# Patient Record
Sex: FEMALE | Race: BLACK OR AFRICAN AMERICAN | NOT HISPANIC OR LATINO | URBAN - METROPOLITAN AREA
[De-identification: names, ages, dates, MRNs, and addresses within clinical notes are randomized per-mention and may not be internally consistent; named-entity substitution may affect disease eponyms.]

---

## 2017-10-13 ENCOUNTER — EMERGENCY (EMERGENCY)
Facility: HOSPITAL | Age: 52
LOS: 0 days | Discharge: HOME | End: 2017-10-13

## 2017-10-13 DIAGNOSIS — M54.9 DORSALGIA, UNSPECIFIED: ICD-10-CM

## 2017-10-13 DIAGNOSIS — M54.2 CERVICALGIA: ICD-10-CM
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
## 2017-10-13 DIAGNOSIS — R07.89 OTHER CHEST PAIN: ICD-10-CM
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
## 2021-03-10 ENCOUNTER — INPATIENT (INPATIENT)
Facility: HOSPITAL | Age: 56
LOS: 36 days | Discharge: HOME HEALTH | End: 2021-04-16
Attending: HOSPITALIST | Admitting: HOSPITALIST
Payer: MEDICAID

## 2021-03-10 VITALS
HEART RATE: 116 BPM | DIASTOLIC BLOOD PRESSURE: 63 MMHG | TEMPERATURE: 98 F | OXYGEN SATURATION: 78 % | RESPIRATION RATE: 28 BRPM | SYSTOLIC BLOOD PRESSURE: 140 MMHG

## 2021-03-10 LAB
ALBUMIN SERPL ELPH-MCNC: 3.9 G/DL — SIGNIFICANT CHANGE UP (ref 3.5–5.2)
ALP SERPL-CCNC: 81 U/L — SIGNIFICANT CHANGE UP (ref 30–115)
ALT FLD-CCNC: 31 U/L — SIGNIFICANT CHANGE UP (ref 0–41)
ANION GAP SERPL CALC-SCNC: 17 MMOL/L — HIGH (ref 7–14)
AST SERPL-CCNC: 77 U/L — HIGH (ref 0–41)
BASOPHILS # BLD AUTO: 0.01 K/UL — SIGNIFICANT CHANGE UP (ref 0–0.2)
BASOPHILS NFR BLD AUTO: 0.1 % — SIGNIFICANT CHANGE UP (ref 0–1)
BILIRUB SERPL-MCNC: <0.2 MG/DL — SIGNIFICANT CHANGE UP (ref 0.2–1.2)
BUN SERPL-MCNC: 18 MG/DL — SIGNIFICANT CHANGE UP (ref 10–20)
CALCIUM SERPL-MCNC: 8.7 MG/DL — SIGNIFICANT CHANGE UP (ref 8.5–10.1)
CHLORIDE SERPL-SCNC: 102 MMOL/L — SIGNIFICANT CHANGE UP (ref 98–110)
CO2 SERPL-SCNC: 19 MMOL/L — SIGNIFICANT CHANGE UP (ref 17–32)
CREAT SERPL-MCNC: 1.1 MG/DL — SIGNIFICANT CHANGE UP (ref 0.7–1.5)
D DIMER BLD IA.RAPID-MCNC: 248 NG/ML DDU — HIGH (ref 0–230)
EOSINOPHIL # BLD AUTO: 0 K/UL — SIGNIFICANT CHANGE UP (ref 0–0.7)
EOSINOPHIL NFR BLD AUTO: 0 % — SIGNIFICANT CHANGE UP (ref 0–8)
GAS PNL BLDV: SIGNIFICANT CHANGE UP
GLUCOSE SERPL-MCNC: 276 MG/DL — HIGH (ref 70–99)
HCT VFR BLD CALC: 36.1 % — LOW (ref 37–47)
HGB BLD-MCNC: 11.6 G/DL — LOW (ref 12–16)
IMM GRANULOCYTES NFR BLD AUTO: 0.4 % — HIGH (ref 0.1–0.3)
LACTATE SERPL-SCNC: 2.4 MMOL/L — HIGH (ref 0.7–2)
LYMPHOCYTES # BLD AUTO: 1.06 K/UL — LOW (ref 1.2–3.4)
LYMPHOCYTES # BLD AUTO: 13.9 % — LOW (ref 20.5–51.1)
MCHC RBC-ENTMCNC: 29 PG — SIGNIFICANT CHANGE UP (ref 27–31)
MCHC RBC-ENTMCNC: 32.1 G/DL — SIGNIFICANT CHANGE UP (ref 32–37)
MCV RBC AUTO: 90.3 FL — SIGNIFICANT CHANGE UP (ref 81–99)
MONOCYTES # BLD AUTO: 0.4 K/UL — SIGNIFICANT CHANGE UP (ref 0.1–0.6)
MONOCYTES NFR BLD AUTO: 5.2 % — SIGNIFICANT CHANGE UP (ref 1.7–9.3)
NEUTROPHILS # BLD AUTO: 6.15 K/UL — SIGNIFICANT CHANGE UP (ref 1.4–6.5)
NEUTROPHILS NFR BLD AUTO: 80.4 % — HIGH (ref 42.2–75.2)
NRBC # BLD: 0 /100 WBCS — SIGNIFICANT CHANGE UP (ref 0–0)
NT-PROBNP SERPL-SCNC: 237 PG/ML — SIGNIFICANT CHANGE UP (ref 0–300)
PLATELET # BLD AUTO: 253 K/UL — SIGNIFICANT CHANGE UP (ref 130–400)
POTASSIUM SERPL-MCNC: 4 MMOL/L — SIGNIFICANT CHANGE UP (ref 3.5–5)
POTASSIUM SERPL-SCNC: 4 MMOL/L — SIGNIFICANT CHANGE UP (ref 3.5–5)
PROT SERPL-MCNC: 7.3 G/DL — SIGNIFICANT CHANGE UP (ref 6–8)
RBC # BLD: 4 M/UL — LOW (ref 4.2–5.4)
RBC # FLD: 13.6 % — SIGNIFICANT CHANGE UP (ref 11.5–14.5)
SODIUM SERPL-SCNC: 138 MMOL/L — SIGNIFICANT CHANGE UP (ref 135–146)
TROPONIN T SERPL-MCNC: 0.02 NG/ML — HIGH
WBC # BLD: 7.65 K/UL — SIGNIFICANT CHANGE UP (ref 4.8–10.8)
WBC # FLD AUTO: 7.65 K/UL — SIGNIFICANT CHANGE UP (ref 4.8–10.8)

## 2021-03-10 PROCEDURE — 93010 ELECTROCARDIOGRAM REPORT: CPT

## 2021-03-10 PROCEDURE — 99291 CRITICAL CARE FIRST HOUR: CPT

## 2021-03-10 RX ORDER — DEXAMETHASONE 0.5 MG/5ML
6 ELIXIR ORAL ONCE
Refills: 0 | Status: COMPLETED | OUTPATIENT
Start: 2021-03-10 | End: 2021-03-10

## 2021-03-10 RX ORDER — ACETAMINOPHEN 500 MG
650 TABLET ORAL ONCE
Refills: 0 | Status: COMPLETED | OUTPATIENT
Start: 2021-03-10 | End: 2021-03-10

## 2021-03-10 RX ADMIN — Medication 650 MILLIGRAM(S): at 23:42

## 2021-03-10 RX ADMIN — Medication 6 MILLIGRAM(S): at 23:42

## 2021-03-10 NOTE — ED PROVIDER NOTE - PHYSICAL EXAMINATION
GENERAL: NAD  SKIN: warm, dry  HEAD: Normocephalic; atraumatic.  EYES: PERRLA, EOMI  CARD: S1, S2 normal; no murmurs, gallops, or rubs. Regular rate and rhythm.   RESP: Tachypneic. No wheezes, rales or rhonchi.  ABD: soft, nontender, and nondistended  EXT :No LE TTP or edema bilaterally.  NEURO: Alert, oriented, grossly unremarkable  PSYCH: Cooperative, appropriate.

## 2021-03-10 NOTE — ED PROVIDER NOTE - NS ED ROS FT
Constitutional: Reports chills.   HEENT: No headache, visual changes  Cardiac:  No chest pain, leg edema, or leg pain.  Respiratory:  No cough and SOB  GI:  No nausea, vomiting, diarrhea, or abdominal pain.  :  No dysuria, frequency, or urgency.  MS:  No myalgia, muscle weakness, joint pain or back pain.  Neuro:  No dizziness, LOC, paralysis, or N/T.  Skin:  No skin rash.   Endocrine: No polyuria, polyphagia, or polydipsia.

## 2021-03-10 NOTE — ED PROVIDER NOTE - ATTENDING CONTRIBUTION TO CARE
I personally evaluated the patient. I reviewed the Resident’s or Physician Assistant’s note (as assigned above), and agree with the findings and plan except as documented in my note.    57 yo woman with acute hypoxic respiratory failure and fever consistent with COVID-19 infection.  IV dexamethasone. Failed proning with NRB.  Hi-flow improved O2 sat and will need critical care admission.

## 2021-03-10 NOTE — ED PROVIDER NOTE - OBJECTIVE STATEMENT
56 y.o. female w/ PMH of HTN presents for SOB x 1 day.  States since yesterday has had increasing SOB, has been having cough since Monday.  Associated chills.  Denies sick contacts, chest pain, leg pain, leg swelling, hx of blood clots, hemoptysis, N/V, abdominal pain, diarrhea.

## 2021-03-10 NOTE — ED PROVIDER NOTE - CARE PLAN
Principal Discharge DX:	Shortness of breath  Secondary Diagnosis:	Elevated troponin  Secondary Diagnosis:	Pneumonia   Principal Discharge DX:	Acute respiratory failure with hypoxia  Secondary Diagnosis:	Elevated troponin  Secondary Diagnosis:	Pneumonia  Secondary Diagnosis:	COVID-19

## 2021-03-10 NOTE — ED PROVIDER NOTE - CLINICAL SUMMARY MEDICAL DECISION MAKING FREE TEXT BOX
55 yo woman with acute hypoxic respiratory failure secondary to COVID.  IV dexamethasone and admission to critical care setting.  Requiring hi-flow O2 with good response.

## 2021-03-11 LAB
ALBUMIN SERPL ELPH-MCNC: 3.7 G/DL — SIGNIFICANT CHANGE UP (ref 3.5–5.2)
ALP SERPL-CCNC: 71 U/L — SIGNIFICANT CHANGE UP (ref 30–115)
ALT FLD-CCNC: 28 U/L — SIGNIFICANT CHANGE UP (ref 0–41)
ANION GAP SERPL CALC-SCNC: 15 MMOL/L — HIGH (ref 7–14)
APPEARANCE UR: CLEAR — SIGNIFICANT CHANGE UP
APTT BLD: 42.1 SEC — HIGH (ref 27–39.2)
AST SERPL-CCNC: 68 U/L — HIGH (ref 0–41)
BACTERIA # UR AUTO: NEGATIVE — SIGNIFICANT CHANGE UP
BASOPHILS # BLD AUTO: 0.01 K/UL — SIGNIFICANT CHANGE UP (ref 0–0.2)
BASOPHILS NFR BLD AUTO: 0.2 % — SIGNIFICANT CHANGE UP (ref 0–1)
BILIRUB SERPL-MCNC: <0.2 MG/DL — SIGNIFICANT CHANGE UP (ref 0.2–1.2)
BILIRUB UR-MCNC: NEGATIVE — SIGNIFICANT CHANGE UP
BLD GP AB SCN SERPL QL: SIGNIFICANT CHANGE UP
BUN SERPL-MCNC: 18 MG/DL — SIGNIFICANT CHANGE UP (ref 10–20)
CALCIUM SERPL-MCNC: 8.5 MG/DL — SIGNIFICANT CHANGE UP (ref 8.5–10.1)
CHLORIDE SERPL-SCNC: 104 MMOL/L — SIGNIFICANT CHANGE UP (ref 98–110)
CK MB CFR SERPL CALC: 5.2 NG/ML — SIGNIFICANT CHANGE UP (ref 0.6–6.3)
CK SERPL-CCNC: 245 U/L — HIGH (ref 0–225)
CO2 SERPL-SCNC: 19 MMOL/L — SIGNIFICANT CHANGE UP (ref 17–32)
COLOR SPEC: YELLOW — SIGNIFICANT CHANGE UP
CREAT SERPL-MCNC: 0.8 MG/DL — SIGNIFICANT CHANGE UP (ref 0.7–1.5)
CRP SERPL-MCNC: 214 MG/L — HIGH
DIFF PNL FLD: ABNORMAL
EOSINOPHIL # BLD AUTO: 0 K/UL — SIGNIFICANT CHANGE UP (ref 0–0.7)
EOSINOPHIL NFR BLD AUTO: 0 % — SIGNIFICANT CHANGE UP (ref 0–8)
EPI CELLS # UR: 13 /HPF — HIGH (ref 0–5)
FERRITIN SERPL-MCNC: 1148 NG/ML — HIGH (ref 15–150)
GLUCOSE BLDC GLUCOMTR-MCNC: 284 MG/DL — HIGH (ref 70–99)
GLUCOSE BLDC GLUCOMTR-MCNC: 301 MG/DL — HIGH (ref 70–99)
GLUCOSE BLDC GLUCOMTR-MCNC: 303 MG/DL — HIGH (ref 70–99)
GLUCOSE BLDC GLUCOMTR-MCNC: 341 MG/DL — HIGH (ref 70–99)
GLUCOSE SERPL-MCNC: 305 MG/DL — HIGH (ref 70–99)
GLUCOSE UR QL: NEGATIVE — SIGNIFICANT CHANGE UP
HCT VFR BLD CALC: 32.8 % — LOW (ref 37–47)
HCV AB S/CO SERPL IA: 0.03 COI — SIGNIFICANT CHANGE UP
HCV AB SERPL-IMP: SIGNIFICANT CHANGE UP
HGB BLD-MCNC: 10.6 G/DL — LOW (ref 12–16)
HYALINE CASTS # UR AUTO: 87 /LPF — HIGH (ref 0–7)
IMM GRANULOCYTES NFR BLD AUTO: 0.3 % — SIGNIFICANT CHANGE UP (ref 0.1–0.3)
INR BLD: 1.21 RATIO — SIGNIFICANT CHANGE UP (ref 0.65–1.3)
KETONES UR-MCNC: SIGNIFICANT CHANGE UP
LACTATE SERPL-SCNC: 1.4 MMOL/L — SIGNIFICANT CHANGE UP (ref 0.7–2)
LEUKOCYTE ESTERASE UR-ACNC: NEGATIVE — SIGNIFICANT CHANGE UP
LYMPHOCYTES # BLD AUTO: 0.71 K/UL — LOW (ref 1.2–3.4)
LYMPHOCYTES # BLD AUTO: 12.2 % — LOW (ref 20.5–51.1)
MAGNESIUM SERPL-MCNC: 2.4 MG/DL — SIGNIFICANT CHANGE UP (ref 1.8–2.4)
MCHC RBC-ENTMCNC: 29 PG — SIGNIFICANT CHANGE UP (ref 27–31)
MCHC RBC-ENTMCNC: 32.3 G/DL — SIGNIFICANT CHANGE UP (ref 32–37)
MCV RBC AUTO: 89.6 FL — SIGNIFICANT CHANGE UP (ref 81–99)
MONOCYTES # BLD AUTO: 0.22 K/UL — SIGNIFICANT CHANGE UP (ref 0.1–0.6)
MONOCYTES NFR BLD AUTO: 3.8 % — SIGNIFICANT CHANGE UP (ref 1.7–9.3)
NEUTROPHILS # BLD AUTO: 4.84 K/UL — SIGNIFICANT CHANGE UP (ref 1.4–6.5)
NEUTROPHILS NFR BLD AUTO: 83.5 % — HIGH (ref 42.2–75.2)
NITRITE UR-MCNC: NEGATIVE — SIGNIFICANT CHANGE UP
NRBC # BLD: 0 /100 WBCS — SIGNIFICANT CHANGE UP (ref 0–0)
PH UR: 6.5 — SIGNIFICANT CHANGE UP (ref 5–8)
PLATELET # BLD AUTO: 242 K/UL — SIGNIFICANT CHANGE UP (ref 130–400)
POTASSIUM SERPL-MCNC: 4.3 MMOL/L — SIGNIFICANT CHANGE UP (ref 3.5–5)
POTASSIUM SERPL-SCNC: 4.3 MMOL/L — SIGNIFICANT CHANGE UP (ref 3.5–5)
PROCALCITONIN SERPL-MCNC: 31.9 NG/ML — HIGH (ref 0.02–0.1)
PROT SERPL-MCNC: 6.8 G/DL — SIGNIFICANT CHANGE UP (ref 6–8)
PROT UR-MCNC: ABNORMAL
PROTHROM AB SERPL-ACNC: 13.9 SEC — HIGH (ref 9.95–12.87)
RAPID RVP RESULT: DETECTED
RBC # BLD: 3.66 M/UL — LOW (ref 4.2–5.4)
RBC # FLD: 13.3 % — SIGNIFICANT CHANGE UP (ref 11.5–14.5)
RBC CASTS # UR COMP ASSIST: 1 /HPF — SIGNIFICANT CHANGE UP (ref 0–4)
SARS-COV-2 RNA SPEC QL NAA+PROBE: DETECTED
SODIUM SERPL-SCNC: 138 MMOL/L — SIGNIFICANT CHANGE UP (ref 135–146)
SP GR SPEC: 1.05 — HIGH (ref 1.01–1.03)
TROPONIN T SERPL-MCNC: 0.03 NG/ML — CRITICAL HIGH
TROPONIN T SERPL-MCNC: 0.04 NG/ML — CRITICAL HIGH
UROBILINOGEN FLD QL: SIGNIFICANT CHANGE UP
WBC # BLD: 5.8 K/UL — SIGNIFICANT CHANGE UP (ref 4.8–10.8)
WBC # FLD AUTO: 5.8 K/UL — SIGNIFICANT CHANGE UP (ref 4.8–10.8)
WBC UR QL: 6 /HPF — HIGH (ref 0–5)

## 2021-03-11 PROCEDURE — 99291 CRITICAL CARE FIRST HOUR: CPT

## 2021-03-11 PROCEDURE — 71045 X-RAY EXAM CHEST 1 VIEW: CPT | Mod: 26

## 2021-03-11 PROCEDURE — 99223 1ST HOSP IP/OBS HIGH 75: CPT

## 2021-03-11 RX ORDER — DEXTROSE 50 % IN WATER 50 %
25 SYRINGE (ML) INTRAVENOUS ONCE
Refills: 0 | Status: DISCONTINUED | OUTPATIENT
Start: 2021-03-11 | End: 2021-03-11

## 2021-03-11 RX ORDER — PANTOPRAZOLE SODIUM 20 MG/1
40 TABLET, DELAYED RELEASE ORAL
Refills: 0 | Status: DISCONTINUED | OUTPATIENT
Start: 2021-03-11 | End: 2021-04-16

## 2021-03-11 RX ORDER — ENOXAPARIN SODIUM 100 MG/ML
40 INJECTION SUBCUTANEOUS DAILY
Refills: 0 | Status: DISCONTINUED | OUTPATIENT
Start: 2021-03-11 | End: 2021-03-11

## 2021-03-11 RX ORDER — REMDESIVIR 5 MG/ML
200 INJECTION INTRAVENOUS EVERY 24 HOURS
Refills: 0 | Status: COMPLETED | OUTPATIENT
Start: 2021-03-11 | End: 2021-03-11

## 2021-03-11 RX ORDER — INSULIN LISPRO 100/ML
2 VIAL (ML) SUBCUTANEOUS
Refills: 0 | Status: DISCONTINUED | OUTPATIENT
Start: 2021-03-11 | End: 2021-03-12

## 2021-03-11 RX ORDER — INSULIN GLARGINE 100 [IU]/ML
4 INJECTION, SOLUTION SUBCUTANEOUS AT BEDTIME
Refills: 0 | Status: DISCONTINUED | OUTPATIENT
Start: 2021-03-11 | End: 2021-03-12

## 2021-03-11 RX ORDER — CHLORHEXIDINE GLUCONATE 213 G/1000ML
1 SOLUTION TOPICAL DAILY
Refills: 0 | Status: DISCONTINUED | OUTPATIENT
Start: 2021-03-11 | End: 2021-04-16

## 2021-03-11 RX ORDER — REMDESIVIR 5 MG/ML
INJECTION INTRAVENOUS
Refills: 0 | Status: COMPLETED | OUTPATIENT
Start: 2021-03-11 | End: 2021-03-15

## 2021-03-11 RX ORDER — SODIUM CHLORIDE 9 MG/ML
1000 INJECTION, SOLUTION INTRAVENOUS
Refills: 0 | Status: DISCONTINUED | OUTPATIENT
Start: 2021-03-11 | End: 2021-03-11

## 2021-03-11 RX ORDER — REMDESIVIR 5 MG/ML
100 INJECTION INTRAVENOUS EVERY 24 HOURS
Refills: 0 | Status: COMPLETED | OUTPATIENT
Start: 2021-03-12 | End: 2021-03-15

## 2021-03-11 RX ORDER — GUAIFENESIN/DEXTROMETHORPHAN 600MG-30MG
10 TABLET, EXTENDED RELEASE 12 HR ORAL EVERY 6 HOURS
Refills: 0 | Status: DISCONTINUED | OUTPATIENT
Start: 2021-03-11 | End: 2021-04-16

## 2021-03-11 RX ORDER — INSULIN LISPRO 100/ML
VIAL (ML) SUBCUTANEOUS
Refills: 0 | Status: DISCONTINUED | OUTPATIENT
Start: 2021-03-11 | End: 2021-04-16

## 2021-03-11 RX ORDER — ACETAMINOPHEN 500 MG
650 TABLET ORAL EVERY 6 HOURS
Refills: 0 | Status: DISCONTINUED | OUTPATIENT
Start: 2021-03-11 | End: 2021-03-13

## 2021-03-11 RX ORDER — DEXAMETHASONE 0.5 MG/5ML
6 ELIXIR ORAL DAILY
Refills: 0 | Status: DISCONTINUED | OUTPATIENT
Start: 2021-03-11 | End: 2021-03-31

## 2021-03-11 RX ORDER — DEXTROSE 50 % IN WATER 50 %
15 SYRINGE (ML) INTRAVENOUS ONCE
Refills: 0 | Status: DISCONTINUED | OUTPATIENT
Start: 2021-03-11 | End: 2021-03-11

## 2021-03-11 RX ORDER — GLUCAGON INJECTION, SOLUTION 0.5 MG/.1ML
1 INJECTION, SOLUTION SUBCUTANEOUS ONCE
Refills: 0 | Status: DISCONTINUED | OUTPATIENT
Start: 2021-03-11 | End: 2021-03-11

## 2021-03-11 RX ORDER — ENOXAPARIN SODIUM 100 MG/ML
40 INJECTION SUBCUTANEOUS
Refills: 0 | Status: DISCONTINUED | OUTPATIENT
Start: 2021-03-11 | End: 2021-03-16

## 2021-03-11 RX ORDER — DEXTROSE 50 % IN WATER 50 %
12.5 SYRINGE (ML) INTRAVENOUS ONCE
Refills: 0 | Status: DISCONTINUED | OUTPATIENT
Start: 2021-03-11 | End: 2021-03-11

## 2021-03-11 RX ORDER — SODIUM CHLORIDE 9 MG/ML
1000 INJECTION INTRAMUSCULAR; INTRAVENOUS; SUBCUTANEOUS
Refills: 0 | Status: DISCONTINUED | OUTPATIENT
Start: 2021-03-11 | End: 2021-03-15

## 2021-03-11 RX ADMIN — Medication 4: at 16:37

## 2021-03-11 RX ADMIN — REMDESIVIR 200 MILLIGRAM(S): 5 INJECTION INTRAVENOUS at 03:52

## 2021-03-11 RX ADMIN — INSULIN GLARGINE 4 UNIT(S): 100 INJECTION, SOLUTION SUBCUTANEOUS at 21:06

## 2021-03-11 RX ADMIN — SODIUM CHLORIDE 75 MILLILITER(S): 9 INJECTION INTRAMUSCULAR; INTRAVENOUS; SUBCUTANEOUS at 08:42

## 2021-03-11 RX ADMIN — PANTOPRAZOLE SODIUM 40 MILLIGRAM(S): 20 TABLET, DELAYED RELEASE ORAL at 08:36

## 2021-03-11 RX ADMIN — Medication 650 MILLIGRAM(S): at 21:06

## 2021-03-11 RX ADMIN — ENOXAPARIN SODIUM 40 MILLIGRAM(S): 100 INJECTION SUBCUTANEOUS at 17:08

## 2021-03-11 RX ADMIN — Medication 650 MILLIGRAM(S): at 21:27

## 2021-03-11 RX ADMIN — Medication 6 MILLIGRAM(S): at 11:14

## 2021-03-11 RX ADMIN — Medication 2 UNIT(S): at 16:37

## 2021-03-11 NOTE — H&P ADULT - ATTENDING COMMENTS
HPI:  57 y/o woman with a past medical history of HTN admitted for acute hypoxic respiratory failure secondary to COVID pneumonia. Her symptoms began on Monday, 3/8 and included malaises, fevers, and shortness of breath. She reported that her shortness of breath progressed to where she was dyspneic at rest.   Her chest xray showed severe b/l opacities and is requiring High Flow nasal cannula since presentation. On my interview she had desatted into the mid-low 80's after using the bed pan. Increased high flow from 50L/50% to 60L/70% and she improved to 93% on her pulse ox. She reports that her dyspnea is much better than before.       REVIEW OF SYSTEMS:  CONSTITUTIONAL:  No weakness, + fevers, malaise, chills, no night sweats, weight loss  EYES/ENT: No visual changes. No vertigo or dysphagia  NECK: No neck pain or stiffness  RESPIRATORY: + cough, no wheezing, hemoptysis. + shortness of breath  CARDIOVASCULAR: No chest pain or palpitations. No lower extremity edema  GASTROINTESTINAL: No abdominal pain. No nausea, vomiting, diarrhea, or hematemesis  GENITOURINARY: No dysuria or hematuria   NEUROLOGICAL: No focal numbness or weakness  SKIN: No rashes or itching  HEMATOLOGIC: No easy bruising or prolonged bleeding.      PHYSICAL EXAM:  GENERAL: NAD, obese, Non-toxic, stated age   HEAD:  Atraumatic, Normocephalic  EYES: EOMI, Sclera White   NECK: Supple, No JVD  CHEST/LUNG: b/l crackles; No wheezing, rhonchi. On 60L/70% High Flow NC, mildly tachypneic and speaking in short sentences.   HEART: Regular rate and rhythm; s1, s2, No murmurs, rubs, or gallops  ABDOMEN: Soft, Nontender, Nondistended; Bowel sounds present, No rebound or guarding noted   EXTREMITIES:  No lower extremity edema or calf tenderness to palpation.  No clubbing or cyanosis  PSYCH: AAOx3, pleasant, cooperative, not anxious  NEUROLOGY: non-focal  SKIN: No rashes or lesions      ASSESSMENT AND PLAN:  Acute Hypoxic Respiratory Failure secondary to COVID-19 Pneumonia: SIRS present on admission. ID consulted, on Remdesivir 200mg x1, 100mg qD, Dexamethasone 6mg q8. Follow up inflammatory markers (Ferritin, LDH, CRP, ESR, D-Dimer) and procalcitonin. Doubt superimposed bacterial pneumonia at this time. on 60L/.70% high flow nasal cannula satting 93%, would likely keep her oximetry higher than usual, she is dark skinned black and the oximetry may underestimate her true hypoxia. .Check type and screen and COVID antibodies for possible plasma. may benefit from Toci as well. Admitted to Step down.   Overall concerning presentation due to the severity in her hypoxia in a short amount of time. She may require BiPAP or intubation in the near future.     Lactic Acidosis: mild, follow up repeat. on IVF.     Transaminitis: possibly secondary to COVID vs. hemolyze specimen. Follow up repeat AM labs.     Elevated D-Dimer: secondary to COVID.     HTN: she is unsure of what her medications are. and referred me to her  who knows her medical history better.   Day team to follow up with her  or PMD or Pharmacy to better clarify her history.     DVT ppx: Lovenox BID   GI ppx: Not indicated  GOC: Started discussion of goals of care, she reported that she did not want to be intubated. I told her the risks of refusing intubation (death) should she need it in the near future. She is aware but did not want to fill out the MOLST for yet and would like to wait until later in the day. Medical team will follow back up with a goals of care discussion and MOLST form when she is willing to complete it and discuss it further in depth.   For now Full Code.       My note supersedes the residents in the event of a discrepancy.

## 2021-03-11 NOTE — H&P ADULT - NSHPLABSRESULTS_GEN_ALL_CORE
Complete Blood Count + Automated Diff (03.10.21 @ 22:24)   WBC Count: 7.65 K/uL   RBC Count: 4.00 M/uL   Hemoglobin: 11.6 g/dL   Hematocrit: 36.1 %   Mean Cell Volume: 90.3 fL   Mean Cell Hemoglobin: 29.0 pg   Mean Cell Hemoglobin Conc: 32.1 g/dL   Red Cell Distrib Width: 13.6 %   Platelet Count - Automated: 253 K/uL   Auto Neutrophil #: 6.15 K/uL   Auto Lymphocyte #: 1.06 K/uL   Auto Monocyte #: 0.40 K/uL   Auto Eosinophil #: 0.00 K/uL   Auto Basophil #: 0.01 K/uL   Auto Neutrophil %: 80.4: Differential percentages must be correlated with absolute numbers for   clinical significance. %   Auto Lymphocyte %: 13.9 %   Auto Monocyte %: 5.2 %   Auto Eosinophil %: 0.0 %   Auto Basophil %: 0.1 %   Auto Immature Granulocyte %: 0.4 %     Comprehensive Metabolic Panel (03.10.21 @ 22:24)   Sodium, Serum: 138 mmol/L   Potassium, Serum: 4.0: Hemolyzed. Interpret with caution mmol/L   Chloride, Serum: 102 mmol/L   Carbon Dioxide, Serum: 19 mmol/L   Anion Gap, Serum: 17 mmol/L   Blood Urea Nitrogen, Serum: 18 mg/dL   Creatinine, Serum: 1.1 mg/dL   Glucose, Serum: 276 mg/dL   Calcium, Total Serum: 8.7 mg/dL   Protein Total, Serum: 7.3 g/dL   Albumin, Serum: 3.9 g/dL   Bilirubin Total, Serum: <0.2 mg/dL   Alkaline Phosphatase, Serum: 81 U/L   Aspartate Aminotransferase (AST/SGOT): 77: Hemolyzed. Interpret with caution U/L   Alanine Aminotransferase (ALT/SGPT): 31: Hemolyzed. Interpret with caution U/L   Nucleated RBC: 0 /100 WBCs

## 2021-03-11 NOTE — H&P ADULT - ASSESSMENT
56-year-old, female patient, PMHx: Hypertension, presented to the ED for Shortness of breath. Jacqueline goes back to 2 days prior to presentation when the patient started having dyspnea. New-onset, aggravated by exertion, no alleviating factors. No orthopnea. The patient had an associated cough, productive of whitish sputum. She denies any fever ( but was febrile in the ED). No sore throat, no anosmia no loss of taste. No skin contact, no recent travel.     #Acute Hypoxic Respiratory failure secondary to SARS-CoV-2 Pneumonia:   - PCR: Positive  - Currently on HFNC 50L/60%, Saturation: 88%.   - Start Dexamethasone 6mg IV once daily for 10 days or discharge whichever comes first  - Remdesevir 200mf IV once then 100mg IV once daily for 4 days ( Total 5 days). ( Renal failure is ok, Mild transaminitis < 3 UNL --? Ok to continue with Remdesivir)  - Trend Inflammatory Markers. Will keep off antibiotics for now.  - Will send COVID-19 antibodies and Type and screen with confirmatory sample, given new onset of symptoms --> Antibodies unlikely there, may benefit from Convalescent Plasma  - Lymphopenia, no leukopenia or thrombocytopenia  - ID Consult    #High Troponin:   - Troponin: 0.02 at admission  - No Active Chest pain  - EKG: Sinus Tachycardia  - Follow up a.m. Troponin    #Transaminitis:   - Not concerning given that the sample is hemolyzed, follow up repeat in a.m.  - AST:77 ALT:31   - Will start on IV fluids, DC if LFTs normalize    #Hypertension:   - The patient does not know her medication or the dose, please call the pharmacy in the morning to confirm.     #Misc:   - DVT prophylaxis: Lovenox 40mg subcutaneous once daily  - GI prophy: Pantoprazole 40mg po once daily  - Diet: DASH/TLC  - Dsipo: Acute    1) Call the pharmacy for blood pressure medication confirmation  2) Follow up Weight and adjust Lovenox dosing accordingly, may need q12hrs if BMI >30kg/m2   3) Follow up LFTs, may want to stop remdesevir if LFTs > 3UNL  4) Follow up a.m. Troponin

## 2021-03-11 NOTE — CONSULT NOTE ADULT - ASSESSMENT
IMPRESSION:    Acute hypoxemic resp failure on HHFNC 70%  Severe covid pneumonia  Doubt bacterial superinfection      PLAN:    CNS: Avoid CND depressant    HEENT:  Oral care    PULMONARY:  HOB @ 45 degrees, HHFNC/ BIPAP keep SAO2 88 to 94%, incentive spirometry    CARDIOVASCULAR: keep equal balance    GI: GI prophylaxis                                          Feeding po    RENAL:  F/u  lytes.  Correct as needed. accurate I/O    INFECTIOUS DISEASE: RZD/ Decadron, abx for covid, trend infl markers    HEMATOLOGICAL:  DVT prophylaxis.  LE doppler    ENDOCRINE:  Follow up FS.  Insulin protocol if needed.    CODE STATUS: FULL CODE    DISPOSITION: SDU    poor prognosis

## 2021-03-11 NOTE — H&P ADULT - HISTORY OF PRESENT ILLNESS
56-year-old, female patient, PMHx: Hypertension, presented to the ED for Shortness of breath. Histoy goes back to 2 days prior to presentation when the patient started having dyspnea. New-onset, aggravated by exertion, no alleviating factors. No orthopnea. The patient had an associated cough, productive of whitish sputum. She denies any fever ( but was febrile in the ED). No sore throat, no anosmia no loss of taste. No skin contact, no recent travel.     #Acute Hypoxic Respiratory failure secondary to SARS-CoV-2 Pneumonia:   - PCR: Positive  - Currently on HFNC 50L/60%, Saturation: 88%   - Start Dexamethasone 6mg IV once daily for 10 days or discharge whichever comes first  - Remdesevir 200mf IV once then 100mg IV once daily for 4 days ( Total 5 days).   - Trend Inflammatory Markers  - Will send COVID-19 antibodies, given new onset of symptoms --> Antibodies unlikely there, may benefit from Convalescent Plasma  - ID Consult    #Hypertension:     #Misc:   - DVT prophylaxis:   - GI prophy: Pantoprazole 40mg po once daily  - Diet: DASH/TLC  - Dsipo: Acute 56-year-old, female patient, PMHx: Hypertension, presented to the ED for Shortness of breath. Histoy goes back to 2 days prior to presentation when the patient started having dyspnea. New-onset, aggravated by exertion, no alleviating factors. No orthopnea. The patient had an associated cough, productive of whitish sputum. She denies any fever ( but was febrile in the ED). No sore throat, no anosmia no loss of taste. No skin contact, no recent travel.

## 2021-03-11 NOTE — CHART NOTE - NSCHARTNOTEFT_GEN_A_CORE
I spoke to the patient today regarding GOC. Patient was made aware of her option in the event that her case worsened. Patient was agreeable to CPR and Intubation but noted specifically she is okay with a trial period. MOLST form was established and signed.

## 2021-03-11 NOTE — H&P ADULT - NSHPPHYSICALEXAM_GEN_ALL_CORE
General: No acute Distress  Neck: Supple  Heart: RRR, normal s1s2, no murmurs  Lungs: Poor Insp efforts, Bilateral air entry, bilateral mild crackles  Abdomen: +Bs, soft, nontender, non-distended   LL: Bilateral LLE, pittings, +1, no signs of DVT, Negative Homman sign

## 2021-03-12 LAB
A1C WITH ESTIMATED AVERAGE GLUCOSE RESULT: 6.9 % — HIGH (ref 4–5.6)
ALBUMIN SERPL ELPH-MCNC: 3.4 G/DL — LOW (ref 3.5–5.2)
ALP SERPL-CCNC: 75 U/L — SIGNIFICANT CHANGE UP (ref 30–115)
ALT FLD-CCNC: 31 U/L — SIGNIFICANT CHANGE UP (ref 0–41)
ANION GAP SERPL CALC-SCNC: 16 MMOL/L — HIGH (ref 7–14)
AST SERPL-CCNC: 55 U/L — HIGH (ref 0–41)
BASOPHILS # BLD AUTO: 0.01 K/UL — SIGNIFICANT CHANGE UP (ref 0–0.2)
BASOPHILS NFR BLD AUTO: 0.1 % — SIGNIFICANT CHANGE UP (ref 0–1)
BILIRUB SERPL-MCNC: <0.2 MG/DL — SIGNIFICANT CHANGE UP (ref 0.2–1.2)
BLD GP AB SCN SERPL QL: SIGNIFICANT CHANGE UP
BUN SERPL-MCNC: 35 MG/DL — HIGH (ref 10–20)
CALCIUM SERPL-MCNC: 8.5 MG/DL — SIGNIFICANT CHANGE UP (ref 8.5–10.1)
CHLORIDE SERPL-SCNC: 100 MMOL/L — SIGNIFICANT CHANGE UP (ref 98–110)
CO2 SERPL-SCNC: 22 MMOL/L — SIGNIFICANT CHANGE UP (ref 17–32)
CREAT SERPL-MCNC: 0.9 MG/DL — SIGNIFICANT CHANGE UP (ref 0.7–1.5)
CRP SERPL-MCNC: 220 MG/L — HIGH
D DIMER BLD IA.RAPID-MCNC: 5287 NG/ML DDU — HIGH (ref 0–230)
EOSINOPHIL # BLD AUTO: 0 K/UL — SIGNIFICANT CHANGE UP (ref 0–0.7)
EOSINOPHIL NFR BLD AUTO: 0 % — SIGNIFICANT CHANGE UP (ref 0–8)
ESTIMATED AVERAGE GLUCOSE: 151 MG/DL — HIGH (ref 68–114)
FERRITIN SERPL-MCNC: 1173 NG/ML — HIGH (ref 15–150)
GLUCOSE BLDC GLUCOMTR-MCNC: 307 MG/DL — HIGH (ref 70–99)
GLUCOSE BLDC GLUCOMTR-MCNC: 333 MG/DL — HIGH (ref 70–99)
GLUCOSE BLDC GLUCOMTR-MCNC: 359 MG/DL — HIGH (ref 70–99)
GLUCOSE BLDC GLUCOMTR-MCNC: 394 MG/DL — HIGH (ref 70–99)
GLUCOSE SERPL-MCNC: 322 MG/DL — HIGH (ref 70–99)
HCT VFR BLD CALC: 33.2 % — LOW (ref 37–47)
HGB BLD-MCNC: 10.7 G/DL — LOW (ref 12–16)
IMM GRANULOCYTES NFR BLD AUTO: 0.7 % — HIGH (ref 0.1–0.3)
LYMPHOCYTES # BLD AUTO: 0.81 K/UL — LOW (ref 1.2–3.4)
LYMPHOCYTES # BLD AUTO: 8.8 % — LOW (ref 20.5–51.1)
MAGNESIUM SERPL-MCNC: 2.7 MG/DL — HIGH (ref 1.8–2.4)
MCHC RBC-ENTMCNC: 29.2 PG — SIGNIFICANT CHANGE UP (ref 27–31)
MCHC RBC-ENTMCNC: 32.2 G/DL — SIGNIFICANT CHANGE UP (ref 32–37)
MCV RBC AUTO: 90.7 FL — SIGNIFICANT CHANGE UP (ref 81–99)
MONOCYTES # BLD AUTO: 0.52 K/UL — SIGNIFICANT CHANGE UP (ref 0.1–0.6)
MONOCYTES NFR BLD AUTO: 5.6 % — SIGNIFICANT CHANGE UP (ref 1.7–9.3)
NEUTROPHILS # BLD AUTO: 7.82 K/UL — HIGH (ref 1.4–6.5)
NEUTROPHILS NFR BLD AUTO: 84.8 % — HIGH (ref 42.2–75.2)
NRBC # BLD: 0 /100 WBCS — SIGNIFICANT CHANGE UP (ref 0–0)
PLATELET # BLD AUTO: 231 K/UL — SIGNIFICANT CHANGE UP (ref 130–400)
POTASSIUM SERPL-MCNC: 4.5 MMOL/L — SIGNIFICANT CHANGE UP (ref 3.5–5)
POTASSIUM SERPL-SCNC: 4.5 MMOL/L — SIGNIFICANT CHANGE UP (ref 3.5–5)
PROCALCITONIN SERPL-MCNC: 21.6 NG/ML — HIGH (ref 0.02–0.1)
PROT SERPL-MCNC: 6.6 G/DL — SIGNIFICANT CHANGE UP (ref 6–8)
RBC # BLD: 3.66 M/UL — LOW (ref 4.2–5.4)
RBC # FLD: 13.9 % — SIGNIFICANT CHANGE UP (ref 11.5–14.5)
SARS-COV-2 IGG SERPL QL IA: NEGATIVE — SIGNIFICANT CHANGE UP
SARS-COV-2 IGM SERPL IA-ACNC: 0.2 INDEX — SIGNIFICANT CHANGE UP
SODIUM SERPL-SCNC: 138 MMOL/L — SIGNIFICANT CHANGE UP (ref 135–146)
TROPONIN T SERPL-MCNC: 0.02 NG/ML — HIGH
WBC # BLD: 9.22 K/UL — SIGNIFICANT CHANGE UP (ref 4.8–10.8)
WBC # FLD AUTO: 9.22 K/UL — SIGNIFICANT CHANGE UP (ref 4.8–10.8)

## 2021-03-12 PROCEDURE — 93970 EXTREMITY STUDY: CPT | Mod: 26

## 2021-03-12 PROCEDURE — 99232 SBSQ HOSP IP/OBS MODERATE 35: CPT

## 2021-03-12 PROCEDURE — 99233 SBSQ HOSP IP/OBS HIGH 50: CPT

## 2021-03-12 RX ORDER — CEFTRIAXONE 500 MG/1
1000 INJECTION, POWDER, FOR SOLUTION INTRAMUSCULAR; INTRAVENOUS EVERY 24 HOURS
Refills: 0 | Status: COMPLETED | OUTPATIENT
Start: 2021-03-12 | End: 2021-03-16

## 2021-03-12 RX ORDER — INSULIN LISPRO 100/ML
3 VIAL (ML) SUBCUTANEOUS
Refills: 0 | Status: DISCONTINUED | OUTPATIENT
Start: 2021-03-12 | End: 2021-04-01

## 2021-03-12 RX ORDER — INSULIN GLARGINE 100 [IU]/ML
10 INJECTION, SOLUTION SUBCUTANEOUS AT BEDTIME
Refills: 0 | Status: DISCONTINUED | OUTPATIENT
Start: 2021-03-12 | End: 2021-03-15

## 2021-03-12 RX ORDER — TOCILIZUMAB 20 MG/ML
400 INJECTION, SOLUTION, CONCENTRATE INTRAVENOUS ONCE
Refills: 0 | Status: DISCONTINUED | OUTPATIENT
Start: 2021-03-12 | End: 2021-03-12

## 2021-03-12 RX ADMIN — Medication 650 MILLIGRAM(S): at 12:02

## 2021-03-12 RX ADMIN — ENOXAPARIN SODIUM 40 MILLIGRAM(S): 100 INJECTION SUBCUTANEOUS at 05:25

## 2021-03-12 RX ADMIN — REMDESIVIR 500 MILLIGRAM(S): 5 INJECTION INTRAVENOUS at 04:00

## 2021-03-12 RX ADMIN — PANTOPRAZOLE SODIUM 40 MILLIGRAM(S): 20 TABLET, DELAYED RELEASE ORAL at 08:28

## 2021-03-12 RX ADMIN — Medication 650 MILLIGRAM(S): at 20:30

## 2021-03-12 RX ADMIN — Medication 5: at 12:00

## 2021-03-12 RX ADMIN — Medication 3 UNIT(S): at 12:00

## 2021-03-12 RX ADMIN — Medication 4: at 17:25

## 2021-03-12 RX ADMIN — INSULIN GLARGINE 10 UNIT(S): 100 INJECTION, SOLUTION SUBCUTANEOUS at 20:31

## 2021-03-12 RX ADMIN — Medication 4: at 08:27

## 2021-03-12 RX ADMIN — CEFTRIAXONE 100 MILLIGRAM(S): 500 INJECTION, POWDER, FOR SOLUTION INTRAMUSCULAR; INTRAVENOUS at 13:20

## 2021-03-12 RX ADMIN — ENOXAPARIN SODIUM 40 MILLIGRAM(S): 100 INJECTION SUBCUTANEOUS at 17:27

## 2021-03-12 RX ADMIN — Medication 6 MILLIGRAM(S): at 06:27

## 2021-03-12 RX ADMIN — Medication 3 UNIT(S): at 17:26

## 2021-03-12 NOTE — PROGRESS NOTE ADULT - ASSESSMENT
IMPRESSION:    Acute hypoxemic resp failure on HHFNC 70%  Severe covid pneumonia  Probable bacterial superinfection      PLAN:    CNS: Avoid CNS depressant    HEENT:  Oral care    PULMONARY:  HOB @ 45 degrees.  Wean O2 as tolerated.  Dexa D# 3    CARDIOVASCULAR: keep equal balance.      GI: GI prophylaxis                                          Feeding po    RENAL:  F/u  lytes.  Correct as needed. accurate I/O    INFECTIOUS DISEASE: FU cultures.  Trend Procal and inflammatory markers.  Start Rocephin and Levaquin.  Urine Legionella and strep Ag.  Toci     HEMATOLOGICAL:  DVT prophylaxis.  LE doppler    ENDOCRINE:  Follow up FS.  Insulin protocol if needed.    CODE STATUS: FULL CODE    DISPOSITION: SDU    poor prognosis IMPRESSION:    Acute hypoxemic resp failure on HHFNC 70%  Severe covid pneumonia  Probable bacterial superinfection      PLAN:    CNS: Avoid CNS depressant    HEENT:  Oral care    PULMONARY:  HOB @ 45 degrees.  Wean O2 as tolerated.  Dexa D# 3    CARDIOVASCULAR: keep equal balance.      GI: GI prophylaxis                                          Feeding po    RENAL:  F/u  lytes.  Correct as needed. accurate I/O    INFECTIOUS DISEASE: FU cultures.  Trend Procal and inflammatory markers.  Start Rocephin and Levaquin.  Urine Legionella and strep Ag.  Toci.  ID note appreciated.  FU COIVD ABD     HEMATOLOGICAL:  DVT prophylaxis.  LE doppler    ENDOCRINE:  Follow up FS.  Insulin protocol if needed.    CODE STATUS: FULL CODE    DISPOSITION: SDU    poor prognosis

## 2021-03-12 NOTE — PROGRESS NOTE ADULT - SUBJECTIVE AND OBJECTIVE BOX
PAULA ELMORE 56y Female  MRN#: [id number]   CODE STATUS:________      SUBJECTIVE  Patient is a 56y old Female who presents with a chief complaint of Shortness of breath (12 Mar 2021 06:34)  Currently admitted to medicine with the primary diagnosis of Shortness of breath      Today is hospital day 2d.  No acute events overnight          OBJECTIVE  PAST MEDICAL & SURGICAL HISTORY    ALLERGIES:  Allergy Status Unknown    MEDICATIONS:  STANDING MEDICATIONS  cefTRIAXone   IVPB 1000 milliGRAM(s) IV Intermittent every 24 hours  chlorhexidine 4% Liquid 1 Application(s) Topical daily  dexAMETHasone  Injectable 6 milliGRAM(s) IV Push daily  enoxaparin Injectable 40 milliGRAM(s) SubCutaneous two times a day  insulin glargine Injectable (LANTUS) 10 Unit(s) SubCutaneous at bedtime  insulin lispro (ADMELOG) corrective regimen sliding scale   SubCutaneous three times a day before meals  insulin lispro Injectable (ADMELOG) 3 Unit(s) SubCutaneous three times a day before meals  levoFLOXacin IVPB      pantoprazole    Tablet 40 milliGRAM(s) Oral before breakfast  remdesivir  IVPB   IV Intermittent   remdesivir  IVPB 100 milliGRAM(s) IV Intermittent every 24 hours  sodium chloride 0.9%. 1000 milliLiter(s) IV Continuous <Continuous>    PRN MEDICATIONS  acetaminophen   Tablet .. 650 milliGRAM(s) Oral every 6 hours PRN  guaifenesin/dextromethorphan  Syrup 10 milliLiter(s) Oral every 6 hours PRN      VITAL SIGNS: Last 24 Hours  T(C): 36.5 (12 Mar 2021 08:49), Max: 37.3 (11 Mar 2021 16:00)  T(F): 97.7 (12 Mar 2021 08:49), Max: 99.1 (11 Mar 2021 16:00)  HR: 73 (12 Mar 2021 08:49) (70 - 89)  BP: 119/56 (12 Mar 2021 08:49) (114/53 - 128/61)  BP(mean): --  RR: 18 (12 Mar 2021 09:39) (18 - 20)  SpO2: 95% (12 Mar 2021 09:39) (89% - 96%)    LABS:                        10.7   9.22  )-----------( 231      ( 12 Mar 2021 08:18 )             33.2     03-12    138  |  100  |  35<H>  ----------------------------<  322<H>  4.5   |  22  |  0.9    Ca    8.5      12 Mar 2021 08:18  Mg     2.7     12    TPro  6.6  /  Alb  3.4<L>  /  TBili  <0.2  /  DBili  x   /  AST  55<H>  /  ALT  31  /  AlkPhos  75  12    PT/INR - ( 11 Mar 2021 06:43 )   PT: 13.90 sec;   INR: 1.21 ratio         PTT - ( 11 Mar 2021 06:43 )  PTT:42.1 sec  Urinalysis Basic - ( 11 Mar 2021 04:30 )    Color: Yellow / Appearance: Clear / S.048 / pH: x  Gluc: x / Ketone: Trace  / Bili: Negative / Urobili: <2 mg/dL   Blood: x / Protein: >600 mg/dL / Nitrite: Negative   Leuk Esterase: Negative / RBC: 1 /HPF / WBC 6 /HPF   Sq Epi: x / Non Sq Epi: 13 /HPF / Bacteria: Negative        Troponin T, Serum: 0.02 ng/mL <H> (21 @ 08:18)      Culture - Blood (collected 10 Mar 2021 23:50)  Source: .Blood Blood-Peripheral  Preliminary Report (12 Mar 2021 07:02):    No growth to date.    Culture - Blood (collected 10 Mar 2021 23:35)  Source: .Blood Blood-Peripheral  Preliminary Report (12 Mar 2021 07:02):    No growth to date.      CARDIAC MARKERS ( 12 Mar 2021 08:18 )  x     / 0.02 ng/mL / x     / x     / x      CARDIAC MARKERS ( 11 Mar 2021 11:00 )  x     / 0.04 ng/mL / x     / x     / x      CARDIAC MARKERS ( 11 Mar 2021 06:43 )  x     / 0.03 ng/mL / 245 U/L / x     / 5.2 ng/mL  CARDIAC MARKERS ( 10 Mar 2021 22:24 )  x     / 0.02 ng/mL / x     / x     / x          RADIOLOGY:  < from: Xray Chest 1 View- PORTABLE-Urgent (21 @ 00:32) >  Impression:    Cardiomegaly with bilateral opacifications consistent with viral pneumonia.    < end of copied text >      PHYSICAL EXAM:    GENERAL: NAD, well-developed, AAOx3  HEENT:  Atraumatic, Normocephalic. EOMI, PERRLA, conjunctiva and sclera clear, No JVD  PULMONARY: decrease entry b/l, No wheeze  CARDIOVASCULAR: Regular rate and rhythm; No murmurs, rubs, or gallops  GASTROINTESTINAL: Soft, Nontender, Nondistended; Bowel sounds present  MUSCULOSKELETAL:  2+ Peripheral Pulses, No clubbing, cyanosis, or edema  NEUROLOGY: non-focal  SKIN: No rashes or lesions

## 2021-03-12 NOTE — PROGRESS NOTE ADULT - SUBJECTIVE AND OBJECTIVE BOX
PAULA ELMORE  56y Female    CHIEF COMPLAINT:    Patient is a 56y old  Female who presents with a chief complaint of Shortness of breath (12 Mar 2021 12:44)      INTERVAL HPI/OVERNIGHT EVENTS:    Patient seen and examined. No acute events overnight. No active complaints    ROS: All other systems are negative.    Vital Signs:    T(F): 97.8 (03-12-21 @ 09:39), Max: 99.1 (03-11-21 @ 16:00)  HR: 93 (03-12-21 @ 09:39) (70 - 93)  BP: 113/74 (03-12-21 @ 09:39) (113/74 - 128/61)  RR: 181 (03-12-21 @ 09:39) (18 - 181)  SpO2: 95% (03-12-21 @ 09:39) (89% - 96%)    11 Mar 2021 07:01  -  12 Mar 2021 07:00  --------------------------------------------------------  IN: 750 mL / OUT: 250 mL / NET: 500 mL    POCT Blood Glucose.: 394 mg/dL (12 Mar 2021 11:57)  POCT Blood Glucose.: 333 mg/dL (12 Mar 2021 08:24)  POCT Blood Glucose.: 341 mg/dL (11 Mar 2021 21:02)  POCT Blood Glucose.: 301 mg/dL (11 Mar 2021 16:09)    PHYSICAL EXAM:    GENERAL:  NAD  SKIN: No rashes or lesions  HEENT: Atraumatic. Normocephalic.   NECK: Supple, No JVD.   PULMONARY: Poor inspiratory effort No wheezing. No rales  CVS: Normal S1, S2. Rate and Rhythm are regular.    ABDOMEN/GI: Soft, Nontender, Nondistended; BS present  MSK:  No edema B/L LE. No clubbing or cyanosis  NEUROLOGIC: moves all extremities  PSYCH: Alert & oriented x 3, normal affect    Consultant(s) Notes Reviewed:  [x ] YES  [ ] NO  Care Discussed with Consultants/Other Providers [ x] YES  [ ] NO    LABS:                        10.7   9.22  )-----------( 231      ( 12 Mar 2021 08:18 )             33.2     138  |  100  |  35<H>  ----------------------------<  322<H>  4.5   |  22  |  0.9    Ca    8.5      12 Mar 2021 08:18  Mg     2.7     03-12    TPro  6.6  /  Alb  3.4<L>  /  TBili  <0.2  /  DBili  x   /  AST  55<H>  /  ALT  31  /  AlkPhos  75  03-12    PT/INR - ( 11 Mar 2021 06:43 )   PT: 13.90 sec;   INR: 1.21 ratio       PTT - ( 11 Mar 2021 06:43 )  PTT:42.1 sec  Serum Pro-Brain Natriuretic Peptide: 237 pg/mL (03-10-21 @ 22:24)    Trop 0.02, CKMB --, CK --, 03-12-21 @ 08:18  Trop 0.04, CKMB --, CK --, 03-11-21 @ 11:00  Trop 0.03, CKMB 5.2, , 03-11-21 @ 06:43  Trop 0.02, CKMB --, CK --, 03-10-21 @ 22:24    Culture - Blood (collected 10 Mar 2021 23:50)  Source: .Blood Blood-Peripheral  Preliminary Report (12 Mar 2021 07:02):    No growth to date.    Culture - Blood (collected 10 Mar 2021 23:35)  Source: .Blood Blood-Peripheral  Preliminary Report (12 Mar 2021 07:02):    No growth to date.    RADIOLOGY & ADDITIONAL TESTS:  < from: Xray Chest 1 View- PORTABLE-Urgent (03.11.21 @ 00:32) >  Impression:    Cardiomegaly with bilateral opacifications consistent with viral pneumonia.    < end of copied text >    Imaging or report Personally Reviewed:  [x] YES  [ ] NO  EKG reviewed: [x] YES  [ ] NO    Medications:  Standing  cefTRIAXone   IVPB 1000 milliGRAM(s) IV Intermittent every 24 hours  chlorhexidine 4% Liquid 1 Application(s) Topical daily  dexAMETHasone  Injectable 6 milliGRAM(s) IV Push daily  enoxaparin Injectable 40 milliGRAM(s) SubCutaneous two times a day  insulin glargine Injectable (LANTUS) 10 Unit(s) SubCutaneous at bedtime  insulin lispro (ADMELOG) corrective regimen sliding scale   SubCutaneous three times a day before meals  insulin lispro Injectable (ADMELOG) 3 Unit(s) SubCutaneous three times a day before meals  levoFLOXacin IVPB      levoFLOXacin IVPB 750 milliGRAM(s) IV Intermittent once  pantoprazole    Tablet 40 milliGRAM(s) Oral before breakfast  remdesivir  IVPB   IV Intermittent   remdesivir  IVPB 100 milliGRAM(s) IV Intermittent every 24 hours  sodium chloride 0.9%. 1000 milliLiter(s) IV Continuous <Continuous>    PRN Meds  acetaminophen   Tablet .. 650 milliGRAM(s) Oral every 6 hours PRN  guaifenesin/dextromethorphan  Syrup 10 milliLiter(s) Oral every 6 hours PRN

## 2021-03-12 NOTE — PROGRESS NOTE ADULT - ASSESSMENT
56-year-old, female patient, PMHx: Hypertension, presented to the ED for Shortness of breath. Histoy goes back to 2 days prior to presentation when the patient started having dyspnea. New-onset, aggravated by exertion, no alleviating factors. No orthopnea. The patient had an associated cough, productive of whitish sputum.     IMPRESSION;  COVID 19 with severe illness. SpO2 < 94% on RA and need for supplemental O2.  Pt is in the early viral replicative phase based on the timeline/onset of symptoms.  Inflammatory markers are elevated and suggestive of a cytokine response/cytokine storm.   ddimers 248  Procal 21.60  ferritin 1148    No bacterial PNA    s/p plasma 3/11    RECOMMENDATIONS;  Target SpO2 92 % to 96 %  Day 1 : Single  mg IV loading dose  Day 2-5 : single  mg IV once daily maintenance dose  Daily CBC,CMP.  Dexamethasone 6 mg iv q24h for 10 days.  Monitor for side effects: hyperglycemia, neurological ( agitation/confusion), adrenal suppression, bacterial and fungal infections  Believe too early for Toci  Will hold off for now

## 2021-03-12 NOTE — PROGRESS NOTE ADULT - ASSESSMENT
56-year-old, female patient, PMHx: Hypertension, presented to the ED for Shortness of breath. Jacqueline goes back to 2 days prior to presentation when the patient started having dyspnea. New-onset, aggravated by exertion, no alleviating factors. No orthopnea. The patient had an associated cough, productive of whitish sputum. She denies any fever ( but was febrile in the ED). No sore throat, no anosmia no loss of taste. No skin contact, no recent travel.     #Acute Hypoxic Respiratory failure secondary to SARS-CoV-2 Pneumonia:   - PCR: Positive  - Currently on HFNC 50L/70%, Saturation: 94%.   - c/w Dexamethasone 6mg IV once daily for 10 days or discharge whichever comes first  - Remdesevir 100mg IV once daily, day #2.   - Trend Inflammatory Markers.   - Started rocephin and levaquin  - COVID-19 antibodies negative  - Will give Convalescent Plasma today  - ID following     #Very high D-dimer >5000  - f/u LE duplex    #High Troponin initially, down trending  - Troponin: 0.02 at admission then .03, .04, now .02  - No Active Chest pain  - EKG: Sinus Tachycardia    #Transaminitis:   - AST:77 ALT:31, down trended since admission    #Hypertension:   - The patient does not know her medication or the dose, please call the pharmacy in the morning to confirm.     #Misc:   - DVT prophylaxis: Lovenox 40mg subcutaneous once daily  - GI prophy: Pantoprazole 40mg po once daily  - Diet: DASH/TLC  - Dsipo: Acute

## 2021-03-12 NOTE — PROGRESS NOTE ADULT - ASSESSMENT
56-year-old, female patient, PMHx: Hypertension, presented to the ED for Shortness of breath. Histoy goes back to 2 days prior to presentation when the patient started having dyspnea associated with a cough, productive of whitish sputum. She denies any fever ( but was febrile in the ED). No sore throat, no anosmia no loss of taste. No sick contacts, no recent travel.     Acute Hypoxemic respiratory failure secondary to COVID 19 PNA  Suspected superimposed bacterial PNA  transaminitis  currently on HFNC 50/70 saturating 95%  Procal 21, on ceftriaxone and levaquin  f/u blood cultures, urine strep and legionella   on dexamethasone 6mg daily, started 3/10  on RDV, started 3/11  ID following, too early for Toci    Ddimer 5287, check LE duplex, c/w lovenox 40 Q12H   trend inflammatory markers  taper down o2 as tolerated    Hypertension  BP controlled, c/w monitoring    New onset DM  Steroid induced hyperglycemia  Hba1c 6.9, patient was not being treated for DM as OP  c/w insulin for now given that she is on steroids  monitor FS    Patient requires continuous monitoring in CEu

## 2021-03-12 NOTE — PROGRESS NOTE ADULT - SUBJECTIVE AND OBJECTIVE BOX
Patient is a 56y old  Female who presents with a chief complaint of Shortness of breath (11 Mar 2021 06:41)        Over Night Events:  no new complaints.  BOWEN.  on HFNCO2.          ROS:     All ROS are negative except HPI         PHYSICAL EXAM    ICU Vital Signs Last 24 Hrs  T(C): 36.7 (11 Mar 2021 20:00), Max: 37.7 (11 Mar 2021 08:10)  T(F): 98 (11 Mar 2021 20:00), Max: 99.9 (11 Mar 2021 08:10)  HR: 70 (12 Mar 2021 04:00) (70 - 89)  BP: 124/58 (12 Mar 2021 04:00) (114/53 - 128/61)  BP(mean): --  ABP: --  ABP(mean): --  RR: 18 (12 Mar 2021 04:00) (18 - 20)  SpO2: 91% (12 Mar 2021 04:00) (89% - 97%)      CONSTITUTIONAL:  Well nourished.  NAD    ENT:   Airway patent,   Mouth with normal mucosa.   No thrush    EYES:   Pupils equal,   Round and reactive to light.    CARDIAC:   Normal rate,   Regular rhythm.    No edema      Vascular:  Normal systolic impulse  No Carotid bruits    RESPIRATORY:   No wheezing  Bilateral crackles   Normal chest expansion  Not tachypneic,  No use of accessory muscles    GASTROINTESTINAL:  Abdomen soft,   Non-tender,   No guarding,   + BS    MUSCULOSKELETAL:   Range of motion is not limited,  No clubbing, cyanosis    NEUROLOGICAL:   Alert and oriented   No motor  deficits.    SKIN:   Skin normal color for race,   Warm and dry and intact.   No evidence of rash.    HEMATOLOGICAL:  No cervical  lymphadenopathy.  no inguinal lymphadenopathy      21 @ 07:01  -  - @ 06:35  --------------------------------------------------------  IN:    sodium chloride 0.9%: 750 mL  Total IN: 750 mL    OUT:    Voided (mL): 250 mL  Total OUT: 250 mL    Total NET: 500 mL          LABS:                            10.6   5.80  )-----------( 242      ( 11 Mar 2021 06:43 )             32.8                                                   138  |  104  |  18  ----------------------------<  305<H>  4.3   |  19  |  0.8    Ca    8.5      11 Mar 2021 06:43  Mg     2.4     -    TPro  6.8  /  Alb  3.7  /  TBili  <0.2  /  DBili  x   /  AST  68<H>  /  ALT  28  /  AlkPhos  71  03-11      PT/INR - ( 11 Mar 2021 06:43 )   PT: 13.90 sec;   INR: 1.21 ratio         PTT - ( 11 Mar 2021 06:43 )  PTT:42.1 sec                                       Urinalysis Basic - ( 11 Mar 2021 04:30 )    Color: Yellow / Appearance: Clear / S.048 / pH: x  Gluc: x / Ketone: Trace  / Bili: Negative / Urobili: <2 mg/dL   Blood: x / Protein: >600 mg/dL / Nitrite: Negative   Leuk Esterase: Negative / RBC: 1 /HPF / WBC 6 /HPF   Sq Epi: x / Non Sq Epi: 13 /HPF / Bacteria: Negative        CARDIAC MARKERS ( 11 Mar 2021 11:00 )  x     / 0.04 ng/mL / x     / x     / x      CARDIAC MARKERS ( 11 Mar 2021 06:43 )  x     / 0.03 ng/mL / 245 U/L / x     / 5.2 ng/mL  CARDIAC MARKERS ( 10 Mar 2021 22:24 )  x     / 0.02 ng/mL / x     / x     / x                                                LIVER FUNCTIONS - ( 11 Mar 2021 06:43 )  Alb: 3.7 g/dL / Pro: 6.8 g/dL / ALK PHOS: 71 U/L / ALT: 28 U/L / AST: 68 U/L / GGT: x                                                                                                                                       MEDICATIONS  (STANDING):  chlorhexidine 4% Liquid 1 Application(s) Topical daily  dexAMETHasone  Injectable 6 milliGRAM(s) IV Push daily  enoxaparin Injectable 40 milliGRAM(s) SubCutaneous two times a day  insulin glargine Injectable (LANTUS) 4 Unit(s) SubCutaneous at bedtime  insulin lispro (ADMELOG) corrective regimen sliding scale   SubCutaneous three times a day before meals  insulin lispro Injectable (ADMELOG) 2 Unit(s) SubCutaneous three times a day before meals  pantoprazole    Tablet 40 milliGRAM(s) Oral before breakfast  remdesivir  IVPB   IV Intermittent   remdesivir  IVPB 100 milliGRAM(s) IV Intermittent every 24 hours  sodium chloride 0.9%. 1000 milliLiter(s) (75 mL/Hr) IV Continuous <Continuous>    MEDICATIONS  (PRN):  acetaminophen   Tablet .. 650 milliGRAM(s) Oral every 6 hours PRN Mild Pain (1 - 3)  guaifenesin/dextromethorphan  Syrup 10 milliLiter(s) Oral every 6 hours PRN Cough      New X-rays reviewed:                                                                                  ECHO    CXR interpreted by me:  Bilateral infiltrates

## 2021-03-12 NOTE — PROGRESS NOTE ADULT - SUBJECTIVE AND OBJECTIVE BOX
CATARINA, PAULA  56y, Female    All available historical data reviewed    OVERNIGHT EVENTS:  no fevers  feels well and has no complaints  HFNC    ROS:  General: Denies rigors, nightsweats  HEENT: Denies headache, rhinorrhea, sore throat, eye pain  CV: Denies CP, palpitations  PULM: Denies wheezing, hemoptysis  GI: Denies hematemesis, hematochezia, melena  : Denies discharge, hematuria  MSK: Denies arthralgias, myalgias  SKIN: Denies rash, lesions  NEURO: Denies paresthesias, weakness  PSYCH: Denies depression, anxiety    VITALS:  T(F): 97.7, Max: 99.1 (21 @ 16:00)  HR: 73  BP: 119/56  RR: 18Vital Signs Last 24 Hrs  T(C): 36.5 (12 Mar 2021 08:49), Max: 37.3 (11 Mar 2021 16:00)  T(F): 97.7 (12 Mar 2021 08:49), Max: 99.1 (11 Mar 2021 16:00)  HR: 73 (12 Mar 2021 08:49) (70 - 89)  BP: 119/56 (12 Mar 2021 08:49) (114/53 - 128/61)  BP(mean): --  RR: 18 (12 Mar 2021 09:39) (18 - 20)  SpO2: 95% (12 Mar 2021 09:39) (89% - 96%)    TESTS & MEASUREMENTS:                        10.7   9.22  )-----------( 231      ( 12 Mar 2021 08:18 )             33.2     03-12    138  |  100  |  35<H>  ----------------------------<  322<H>  4.5   |  22  |  0.9    Ca    8.5      12 Mar 2021 08:18  Mg     2.7     03-12    TPro  6.6  /  Alb  3.4<L>  /  TBili  <0.2  /  DBili  x   /  AST  55<H>  /  ALT  31  /  AlkPhos  75  03-12    LIVER FUNCTIONS - ( 12 Mar 2021 08:18 )  Alb: 3.4 g/dL / Pro: 6.6 g/dL / ALK PHOS: 75 U/L / ALT: 31 U/L / AST: 55 U/L / GGT: x             Culture - Blood (collected 03-10-21 @ 23:50)  Source: .Blood Blood-Peripheral  Preliminary Report (21 @ 07:02):    No growth to date.    Culture - Blood (collected 03-10-21 @ 23:35)  Source: .Blood Blood-Peripheral  Preliminary Report (21 @ 07:02):    No growth to date.      Urinalysis Basic - ( 11 Mar 2021 04:30 )    Color: Yellow / Appearance: Clear / S.048 / pH: x  Gluc: x / Ketone: Trace  / Bili: Negative / Urobili: <2 mg/dL   Blood: x / Protein: >600 mg/dL / Nitrite: Negative   Leuk Esterase: Negative / RBC: 1 /HPF / WBC 6 /HPF   Sq Epi: x / Non Sq Epi: 13 /HPF / Bacteria: Negative          RADIOLOGY & ADDITIONAL TESTS:  Personal review of radiological diagnostics performed  Echo and EKG results noted when applicable.     MEDICATIONS:  acetaminophen   Tablet .. 650 milliGRAM(s) Oral every 6 hours PRN  cefTRIAXone   IVPB 1000 milliGRAM(s) IV Intermittent every 24 hours  chlorhexidine 4% Liquid 1 Application(s) Topical daily  dexAMETHasone  Injectable 6 milliGRAM(s) IV Push daily  enoxaparin Injectable 40 milliGRAM(s) SubCutaneous two times a day  guaifenesin/dextromethorphan  Syrup 10 milliLiter(s) Oral every 6 hours PRN  insulin glargine Injectable (LANTUS) 10 Unit(s) SubCutaneous at bedtime  insulin lispro (ADMELOG) corrective regimen sliding scale   SubCutaneous three times a day before meals  insulin lispro Injectable (ADMELOG) 3 Unit(s) SubCutaneous three times a day before meals  levoFLOXacin IVPB      pantoprazole    Tablet 40 milliGRAM(s) Oral before breakfast  remdesivir  IVPB   IV Intermittent   remdesivir  IVPB 100 milliGRAM(s) IV Intermittent every 24 hours  sodium chloride 0.9%. 1000 milliLiter(s) IV Continuous <Continuous>      ANTIBIOTICS:  cefTRIAXone   IVPB 1000 milliGRAM(s) IV Intermittent every 24 hours  levoFLOXacin IVPB      remdesivir  IVPB   IV Intermittent   remdesivir  IVPB 100 milliGRAM(s) IV Intermittent every 24 hours

## 2021-03-13 LAB
ALBUMIN SERPL ELPH-MCNC: 3.4 G/DL — LOW (ref 3.5–5.2)
ALP SERPL-CCNC: 81 U/L — SIGNIFICANT CHANGE UP (ref 30–115)
ALT FLD-CCNC: 38 U/L — SIGNIFICANT CHANGE UP (ref 0–41)
ANION GAP SERPL CALC-SCNC: 13 MMOL/L — SIGNIFICANT CHANGE UP (ref 7–14)
AST SERPL-CCNC: 55 U/L — HIGH (ref 0–41)
BASOPHILS # BLD AUTO: 0.01 K/UL — SIGNIFICANT CHANGE UP (ref 0–0.2)
BASOPHILS NFR BLD AUTO: 0.1 % — SIGNIFICANT CHANGE UP (ref 0–1)
BILIRUB SERPL-MCNC: <0.2 MG/DL — SIGNIFICANT CHANGE UP (ref 0.2–1.2)
BUN SERPL-MCNC: 21 MG/DL — HIGH (ref 10–20)
CALCIUM SERPL-MCNC: 8.7 MG/DL — SIGNIFICANT CHANGE UP (ref 8.5–10.1)
CHLORIDE SERPL-SCNC: 103 MMOL/L — SIGNIFICANT CHANGE UP (ref 98–110)
CO2 SERPL-SCNC: 23 MMOL/L — SIGNIFICANT CHANGE UP (ref 17–32)
CREAT SERPL-MCNC: 0.7 MG/DL — SIGNIFICANT CHANGE UP (ref 0.7–1.5)
CRP SERPL-MCNC: 148 MG/L — HIGH
CULTURE RESULTS: SIGNIFICANT CHANGE UP
D DIMER BLD IA.RAPID-MCNC: HIGH NG/ML DDU (ref 0–230)
EOSINOPHIL # BLD AUTO: 0 K/UL — SIGNIFICANT CHANGE UP (ref 0–0.7)
EOSINOPHIL NFR BLD AUTO: 0 % — SIGNIFICANT CHANGE UP (ref 0–8)
FERRITIN SERPL-MCNC: 1365 NG/ML — HIGH (ref 15–150)
GLUCOSE BLDC GLUCOMTR-MCNC: 201 MG/DL — HIGH (ref 70–99)
GLUCOSE BLDC GLUCOMTR-MCNC: 213 MG/DL — HIGH (ref 70–99)
GLUCOSE BLDC GLUCOMTR-MCNC: 252 MG/DL — HIGH (ref 70–99)
GLUCOSE BLDC GLUCOMTR-MCNC: 284 MG/DL — HIGH (ref 70–99)
GLUCOSE SERPL-MCNC: 225 MG/DL — HIGH (ref 70–99)
HCT VFR BLD CALC: 33.5 % — LOW (ref 37–47)
HGB BLD-MCNC: 11 G/DL — LOW (ref 12–16)
IMM GRANULOCYTES NFR BLD AUTO: 0.8 % — HIGH (ref 0.1–0.3)
LYMPHOCYTES # BLD AUTO: 0.65 K/UL — LOW (ref 1.2–3.4)
LYMPHOCYTES # BLD AUTO: 7.7 % — LOW (ref 20.5–51.1)
MAGNESIUM SERPL-MCNC: 2.2 MG/DL — SIGNIFICANT CHANGE UP (ref 1.8–2.4)
MCHC RBC-ENTMCNC: 29.6 PG — SIGNIFICANT CHANGE UP (ref 27–31)
MCHC RBC-ENTMCNC: 32.8 G/DL — SIGNIFICANT CHANGE UP (ref 32–37)
MCV RBC AUTO: 90.3 FL — SIGNIFICANT CHANGE UP (ref 81–99)
MONOCYTES # BLD AUTO: 0.3 K/UL — SIGNIFICANT CHANGE UP (ref 0.1–0.6)
MONOCYTES NFR BLD AUTO: 3.5 % — SIGNIFICANT CHANGE UP (ref 1.7–9.3)
NEUTROPHILS # BLD AUTO: 7.45 K/UL — HIGH (ref 1.4–6.5)
NEUTROPHILS NFR BLD AUTO: 87.9 % — HIGH (ref 42.2–75.2)
NRBC # BLD: 0 /100 WBCS — SIGNIFICANT CHANGE UP (ref 0–0)
PLATELET # BLD AUTO: 228 K/UL — SIGNIFICANT CHANGE UP (ref 130–400)
POTASSIUM SERPL-MCNC: 4.4 MMOL/L — SIGNIFICANT CHANGE UP (ref 3.5–5)
POTASSIUM SERPL-SCNC: 4.4 MMOL/L — SIGNIFICANT CHANGE UP (ref 3.5–5)
PROCALCITONIN SERPL-MCNC: 14.8 NG/ML — HIGH (ref 0.02–0.1)
PROT SERPL-MCNC: 6.7 G/DL — SIGNIFICANT CHANGE UP (ref 6–8)
RBC # BLD: 3.71 M/UL — LOW (ref 4.2–5.4)
RBC # FLD: 13.5 % — SIGNIFICANT CHANGE UP (ref 11.5–14.5)
SODIUM SERPL-SCNC: 139 MMOL/L — SIGNIFICANT CHANGE UP (ref 135–146)
SPECIMEN SOURCE: SIGNIFICANT CHANGE UP
WBC # BLD: 8.48 K/UL — SIGNIFICANT CHANGE UP (ref 4.8–10.8)
WBC # FLD AUTO: 8.48 K/UL — SIGNIFICANT CHANGE UP (ref 4.8–10.8)

## 2021-03-13 PROCEDURE — 99233 SBSQ HOSP IP/OBS HIGH 50: CPT

## 2021-03-13 RX ORDER — ACETAMINOPHEN 500 MG
650 TABLET ORAL EVERY 6 HOURS
Refills: 0 | Status: DISCONTINUED | OUTPATIENT
Start: 2021-03-13 | End: 2021-04-16

## 2021-03-13 RX ADMIN — CEFTRIAXONE 100 MILLIGRAM(S): 500 INJECTION, POWDER, FOR SOLUTION INTRAMUSCULAR; INTRAVENOUS at 12:22

## 2021-03-13 RX ADMIN — INSULIN GLARGINE 10 UNIT(S): 100 INJECTION, SOLUTION SUBCUTANEOUS at 21:13

## 2021-03-13 RX ADMIN — Medication 650 MILLIGRAM(S): at 05:39

## 2021-03-13 RX ADMIN — Medication 3 UNIT(S): at 12:21

## 2021-03-13 RX ADMIN — Medication 650 MILLIGRAM(S): at 00:07

## 2021-03-13 RX ADMIN — Medication 6 MILLIGRAM(S): at 05:25

## 2021-03-13 RX ADMIN — Medication 650 MILLIGRAM(S): at 06:29

## 2021-03-13 RX ADMIN — Medication 3 UNIT(S): at 16:44

## 2021-03-13 RX ADMIN — Medication 3: at 16:44

## 2021-03-13 RX ADMIN — Medication 3 UNIT(S): at 08:13

## 2021-03-13 RX ADMIN — Medication 650 MILLIGRAM(S): at 21:13

## 2021-03-13 RX ADMIN — Medication 2: at 08:13

## 2021-03-13 RX ADMIN — REMDESIVIR 500 MILLIGRAM(S): 5 INJECTION INTRAVENOUS at 02:51

## 2021-03-13 RX ADMIN — Medication 3: at 12:21

## 2021-03-13 RX ADMIN — PANTOPRAZOLE SODIUM 40 MILLIGRAM(S): 20 TABLET, DELAYED RELEASE ORAL at 06:29

## 2021-03-13 RX ADMIN — ENOXAPARIN SODIUM 40 MILLIGRAM(S): 100 INJECTION SUBCUTANEOUS at 18:45

## 2021-03-13 RX ADMIN — ENOXAPARIN SODIUM 40 MILLIGRAM(S): 100 INJECTION SUBCUTANEOUS at 05:25

## 2021-03-13 NOTE — PROGRESS NOTE ADULT - ASSESSMENT
56-year-old, female patient, PMHx: Hypertension, presented to the ED for Shortness of breath. Jacqueline goes back to 2 days prior to presentation when the patient started having dyspnea associated with a cough, productive of whitish sputum. She denies any fever ( but was febrile in the ED). No sore throat, no anosmia no loss of taste. No sick contacts, no recent travel.     Acute Hypoxemic respiratory failure secondary to COVID 19 PNA  Suspected superimposed bacterial PNA  transaminitis  currently on HFNC 50/80 saturating 95%  Blood cx NGTD  Procal 21, on ceftriaxone and levaquin  f/u urine strep and legionella   on dexamethasone 6mg daily, started 3/10  on RDV, started 3/11  ID following, too early for Toci    Ddimer 5287--->77325, LE duplex 3/12 negative for DVT   c/w lovenox 40 Q12H   trend inflammatory markers  taper down o2 as tolerated    Hypertension  BP controlled, c/w monitoring    New onset DM  Steroid induced hyperglycemia  Hba1c 6.9, patient was not being treated for DM as OP  c/w insulin for now given that she is on steroids  monitor FS    Patient requires continuous monitoring in CEu

## 2021-03-13 NOTE — PROGRESS NOTE ADULT - SUBJECTIVE AND OBJECTIVE BOX
PAULA ELMORE  56y Female    CHIEF COMPLAINT:    Patient is a 56y old  Female who presents with a chief complaint of Shortness of breath (12 Mar 2021 13:37)      INTERVAL HPI/OVERNIGHT EVENTS:    Patient seen and examined. No acute events overnight. no active complaints    ROS: All other systems are negative.    Vital Signs:    T(F): 98.5 (03-13-21 @ 12:25), Max: 99.9 (03-13-21 @ 04:00)  HR: 87 (03-13-21 @ 12:25) (86 - 107)  BP: 154/71 (03-13-21 @ 12:25) (111/55 - 175/77)  RR: 18 (03-13-21 @ 12:25) (18 - 18)  SpO2: 92% (03-13-21 @ 08:00) (86% - 98%)    12 Mar 2021 07:01  -  13 Mar 2021 07:00  --------------------------------------------------------  IN: 400 mL / OUT: 1350 mL / NET: -950 mL    13 Mar 2021 06:01  -  13 Mar 2021 15:23  --------------------------------------------------------  IN: 0 mL / OUT: 300 mL / NET: -300 mL    POCT Blood Glucose.: 284 mg/dL (13 Mar 2021 12:11)  POCT Blood Glucose.: 201 mg/dL (13 Mar 2021 07:54)  POCT Blood Glucose.: 359 mg/dL (12 Mar 2021 19:52)  POCT Blood Glucose.: 307 mg/dL (12 Mar 2021 16:31)    PHYSICAL EXAM:    GENERAL:  NAD  SKIN: No rashes or lesions  HEENT: Atraumatic. Normocephalic.    NECK: Supple, No JVD.    PULMONARY: CTA B/L. No wheezing.    CVS: Normal S1, S2. Rate and Rhythm are regular.    ABDOMEN/GI: Soft, Nontender, Nondistended; BS present  MSK:  No edema B/L LE. No clubbing or cyanosis  NEUROLOGIC:  No motor or sensory deficit.  PSYCH: Alert & oriented x 3, normal affect    Consultant(s) Notes Reviewed:  [x ] YES  [ ] NO  Care Discussed with Consultants/Other Providers [ x] YES  [ ] NO    LABS:                        11.0   8.48  )-----------( 228      ( 13 Mar 2021 08:35 )             33.5    139  |  103  |  21<H>  ----------------------------<  225<H>  4.4   |  23  |  0.7    Ca    8.7      13 Mar 2021 08:35  Mg     2.2     03-13    TPro  6.7  /  Alb  3.4<L>  /  TBili  <0.2  /  DBili  x   /  AST  55<H>  /  ALT  38  /  AlkPhos  81  03-13    Serum Pro-Brain Natriuretic Peptide: 237 pg/mL (03-10-21 @ 22:24)    Trop 0.02, CKMB --, CK --, 03-12-21 @ 08:18  Trop 0.04, CKMB --, CK --, 03-11-21 @ 11:00  Trop 0.03, CKMB 5.2, , 03-11-21 @ 06:43  Trop 0.02, CKMB --, CK --, 03-10-21 @ 22:24    Culture - Urine (collected 11 Mar 2021 04:30)  Source: .Urine Clean Catch (Midstream)  Final Report (13 Mar 2021 10:10):    >=3 organisms. Probable collection contamination.    Culture - Blood (collected 10 Mar 2021 23:50)  Source: .Blood Blood-Peripheral  Preliminary Report (12 Mar 2021 07:02):    No growth to date.    Culture - Blood (collected 10 Mar 2021 23:35)  Source: .Blood Blood-Peripheral  Preliminary Report (12 Mar 2021 07:02):    No growth to date.    RADIOLOGY & ADDITIONAL TESTS:  Imaging or report Personally Reviewed:  [x] YES  [ ] NO  EKG reviewed: [x] YES  [ ] NO    Medications:  Standing  cefTRIAXone   IVPB 1000 milliGRAM(s) IV Intermittent every 24 hours  chlorhexidine 4% Liquid 1 Application(s) Topical daily  dexAMETHasone  Injectable 6 milliGRAM(s) IV Push daily  enoxaparin Injectable 40 milliGRAM(s) SubCutaneous two times a day  insulin glargine Injectable (LANTUS) 10 Unit(s) SubCutaneous at bedtime  insulin lispro (ADMELOG) corrective regimen sliding scale   SubCutaneous three times a day before meals  insulin lispro Injectable (ADMELOG) 3 Unit(s) SubCutaneous three times a day before meals  levoFLOXacin IVPB      levoFLOXacin IVPB 750 milliGRAM(s) IV Intermittent every 24 hours  pantoprazole    Tablet 40 milliGRAM(s) Oral before breakfast  remdesivir  IVPB   IV Intermittent   remdesivir  IVPB 100 milliGRAM(s) IV Intermittent every 24 hours  sodium chloride 0.9%. 1000 milliLiter(s) IV Continuous <Continuous>    PRN Meds  acetaminophen   Tablet .. 650 milliGRAM(s) Oral every 6 hours PRN  guaifenesin/dextromethorphan  Syrup 10 milliLiter(s) Oral every 6 hours PRN

## 2021-03-14 LAB
CRP SERPL-MCNC: 84 MG/L — HIGH
GLUCOSE BLDC GLUCOMTR-MCNC: 194 MG/DL — HIGH (ref 70–99)
GLUCOSE BLDC GLUCOMTR-MCNC: 223 MG/DL — HIGH (ref 70–99)
GLUCOSE BLDC GLUCOMTR-MCNC: 242 MG/DL — HIGH (ref 70–99)
GLUCOSE BLDC GLUCOMTR-MCNC: 260 MG/DL — HIGH (ref 70–99)
PROCALCITONIN SERPL-MCNC: 4.08 NG/ML — HIGH (ref 0.02–0.1)

## 2021-03-14 PROCEDURE — 99233 SBSQ HOSP IP/OBS HIGH 50: CPT

## 2021-03-14 RX ADMIN — REMDESIVIR 500 MILLIGRAM(S): 5 INJECTION INTRAVENOUS at 01:16

## 2021-03-14 RX ADMIN — Medication 3: at 17:01

## 2021-03-14 RX ADMIN — PANTOPRAZOLE SODIUM 40 MILLIGRAM(S): 20 TABLET, DELAYED RELEASE ORAL at 05:38

## 2021-03-14 RX ADMIN — Medication 650 MILLIGRAM(S): at 08:15

## 2021-03-14 RX ADMIN — Medication 2: at 12:48

## 2021-03-14 RX ADMIN — Medication 6 MILLIGRAM(S): at 05:38

## 2021-03-14 RX ADMIN — ENOXAPARIN SODIUM 40 MILLIGRAM(S): 100 INJECTION SUBCUTANEOUS at 05:37

## 2021-03-14 RX ADMIN — INSULIN GLARGINE 10 UNIT(S): 100 INJECTION, SOLUTION SUBCUTANEOUS at 22:01

## 2021-03-14 RX ADMIN — Medication 3 UNIT(S): at 08:15

## 2021-03-14 RX ADMIN — CEFTRIAXONE 100 MILLIGRAM(S): 500 INJECTION, POWDER, FOR SOLUTION INTRAMUSCULAR; INTRAVENOUS at 12:50

## 2021-03-14 RX ADMIN — Medication 3 UNIT(S): at 12:48

## 2021-03-14 RX ADMIN — ENOXAPARIN SODIUM 40 MILLIGRAM(S): 100 INJECTION SUBCUTANEOUS at 17:01

## 2021-03-14 RX ADMIN — Medication 1: at 08:15

## 2021-03-14 RX ADMIN — Medication 3 UNIT(S): at 17:00

## 2021-03-14 NOTE — PROGRESS NOTE ADULT - ASSESSMENT
56-year-old, female patient, PMHx: Hypertension, presented to the ED for Shortness of breath. Jacqueline goes back to 2 days prior to presentation when the patient started having dyspnea associated with a cough, productive of whitish sputum. She denies any fever ( but was febrile in the ED). No sore throat, no anosmia no loss of taste. No sick contacts, no recent travel.     Acute Hypoxemic respiratory failure secondary to COVID 19 PNA  Suspected superimposed bacterial PNA  transaminitis  currently on HFNC 50/100 saturating 96%  Blood cx NGTD  Procal 21---> 4.08, on ceftriaxone and levaquin  f/u urine strep and legionella   on dexamethasone 6mg daily, started 3/10  on RDV, started 3/11  ID following, too early for Toci    Ddimer 5287--->79402, LE duplex 3/12 negative for DVT   c/w lovenox 40 Q12H   trend inflammatory markers  taper down o2 as tolerated    Hypertension  BPacceptable  if elevated, can start [id number] daily     New onset DM  Steroid induced hyperglycemia  Hba1c 6.9, patient was not being treated for DM as OP  c/w insulin for now given that she is on steroids  monitor FS    Patient requires continuous monitoring in CEU

## 2021-03-14 NOTE — PROGRESS NOTE ADULT - ASSESSMENT
56-year-old, female patient, PMHx: Hypertension, presented to the ED for Shortness of breath. Jacqueline goes back to 2 days prior to presentation when the patient started having dyspnea. New-onset, aggravated by exertion, no alleviating factors. No orthopnea. The patient had an associated cough, productive of whitish sputum. She denies any fever ( but was febrile in the ED). No sore throat, no anosmia no loss of taste. No skin contact, no recent travel.     #Acute Hypoxic Respiratory failure secondary to SARS-CoV-2 Pneumonia:   - PCR: Positive  - Currently on HFNC 50L/70%, Saturation: 94%.   - c/w Dexamethasone 6mg IV once daily day #5  - Remdesevir 100mg IV once daily, day #4.   - Trend Inflammatory Markers.   - c/w rocephin and levaquin  - COVID-19 antibodies negative  - s/p Convalescent Plasma   - ID following     #Very high D-dimer >5000, now 11,000  - LE duplex, negative for DVT    #High Troponin initially, down trending  - Troponin: 0.02 at admission then .03, .04, now .02  - No Active Chest pain  - EKG: Sinus Tachycardia    #Transaminitis:   - AST:77 ALT:31, down trended since admission    #Hypertension:   - The patient does not know her medication or the dose, please call the pharmacy in the morning to confirm.     #Misc:   - DVT prophylaxis: Lovenox 40mg subcutaneous once daily  - GI prophy: Pantoprazole 40mg po once daily  - Diet: DASH/TLC  - Dsipo: Acute

## 2021-03-14 NOTE — PROGRESS NOTE ADULT - SUBJECTIVE AND OBJECTIVE BOX
PAULA ELMORE 56y Female  MRN#: [id number]   CODE STATUS:________      SUBJECTIVE  Patient is a 56y old Female who presents with a chief complaint of Shortness of breath (13 Mar 2021 15:23)  Currently admitted to medicine with the primary diagnosis of Acute respiratory failure with hypoxia      Today is hospital day 4d.  No acute events overnight.          OBJECTIVE  PAST MEDICAL & SURGICAL HISTORY    ALLERGIES:  Allergy Status Unknown    MEDICATIONS:  STANDING MEDICATIONS  cefTRIAXone   IVPB 1000 milliGRAM(s) IV Intermittent every 24 hours  chlorhexidine 4% Liquid 1 Application(s) Topical daily  dexAMETHasone  Injectable 6 milliGRAM(s) IV Push daily  enoxaparin Injectable 40 milliGRAM(s) SubCutaneous two times a day  insulin glargine Injectable (LANTUS) 10 Unit(s) SubCutaneous at bedtime  insulin lispro (ADMELOG) corrective regimen sliding scale   SubCutaneous three times a day before meals  insulin lispro Injectable (ADMELOG) 3 Unit(s) SubCutaneous three times a day before meals  levoFLOXacin IVPB      levoFLOXacin IVPB 750 milliGRAM(s) IV Intermittent every 24 hours  pantoprazole    Tablet 40 milliGRAM(s) Oral before breakfast  remdesivir  IVPB   IV Intermittent   remdesivir  IVPB 100 milliGRAM(s) IV Intermittent every 24 hours  sodium chloride 0.9%. 1000 milliLiter(s) IV Continuous <Continuous>    PRN MEDICATIONS  acetaminophen   Tablet .. 650 milliGRAM(s) Oral every 6 hours PRN  guaifenesin/dextromethorphan  Syrup 10 milliLiter(s) Oral every 6 hours PRN      VITAL SIGNS: Last 24 Hours  T(C): 37.9 (14 Mar 2021 08:17), Max: 37.9 (13 Mar 2021 20:09)  T(F): 100.3 (14 Mar 2021 08:17), Max: 100.3 (14 Mar 2021 08:17)  HR: 101 (14 Mar 2021 08:17) (85 - 101)  BP: 162/71 (14 Mar 2021 08:17) (131/60 - 173/71)  BP(mean): --  RR: 20 (14 Mar 2021 08:17) (18 - 20)  SpO2: 97% (14 Mar 2021 08:17) (94% - 99%)    LABS:                        11.0   8.48  )-----------( 228      ( 13 Mar 2021 08:35 )             33.5     03-13    139  |  103  |  21<H>  ----------------------------<  225<H>  4.4   |  23  |  0.7    Ca    8.7      13 Mar 2021 08:35  Mg     2.2     03-13    TPro  6.7  /  Alb  3.4<L>  /  TBili  <0.2  /  DBili  x   /  AST  55<H>  /  ALT  38  /  AlkPhos  81  03-13                  RADIOLOGY:  < from: [facility] Duplex Lower Ext Vein Scan, Bilat (03.12.21 @ 16:32) >  Impression:    No evidence of deep venous thrombosis or superficial thrombophlebitis in the bilateral lower extremities.    < end of copied text >      PHYSICAL EXAM:    GENERAL: NAD, AAOx3  HEENT:  Atraumatic, Normocephalic. EOMI, PERRLA, conjunctiva and sclera clear, No JVD  PULMONARY: Clear to auscultation bilaterally; No wheeze  CARDIOVASCULAR: Regular rate and rhythm; No murmurs, rubs, or gallops  GASTROINTESTINAL: Soft, Nontender, Nondistended; Bowel sounds present  MUSCULOSKELETAL:  2+ Peripheral Pulses, No clubbing, cyanosis, or edema  NEUROLOGY: non-focal  SKIN: No rashes or lesions

## 2021-03-14 NOTE — PROGRESS NOTE ADULT - SUBJECTIVE AND OBJECTIVE BOX
PAULA ELMORE  56y Female    CHIEF COMPLAINT:    Patient is a 56y old  Female who presents with a chief complaint of Shortness of breath (14 Mar 2021 10:55)      INTERVAL HPI/OVERNIGHT EVENTS:    Patient seen and examined. No acute events overnight. PAtient states she feels better today    ROS: All other systems are negative.    Vital Signs:    T(F): 99.7 (03-14-21 @ 12:19), Max: 100.3 (03-14-21 @ 08:17)  HR: 96 (03-14-21 @ 12:19) (85 - 101)  BP: 120/58 (03-14-21 @ 12:19) (120/58 - 173/71)  RR: 20 (03-14-21 @ 12:37) (18 - 20)  SpO2: 96% (03-14-21 @ 12:37) (94% - 99%)    13 Mar 2021 06:01  -  14 Mar 2021 07:00  --------------------------------------------------------  IN: 200 mL / OUT: 900 mL / NET: -700 mL    POCT Blood Glucose.: 242 mg/dL (14 Mar 2021 11:55)  POCT Blood Glucose.: 194 mg/dL (14 Mar 2021 07:58)  POCT Blood Glucose.: 213 mg/dL (13 Mar 2021 20:24)  POCT Blood Glucose.: 252 mg/dL (13 Mar 2021 16:42)    PHYSICAL EXAM:    GENERAL:  NAD  SKIN: No rashes or lesions  HEENT: Atraumatic. Normocephalic.   NECK: Supple, No JVD.  PULMONARY: decreased breath sounds b/l. No wheezing.    CVS: Normal S1, S2. Rate and Rhythm are regular. No murmurs.  ABDOMEN/GI: Soft, Nontender, Nondistended; BS present  MSK: No clubbing or cyanosis  NEUROLOGIC: moves all extremities  PSYCH: Alert & oriented x 3, normal affect    Consultant(s) Notes Reviewed:  [x ] YES  [ ] NO  Care Discussed with Consultants/Other Providers [ x] YES  [ ] NO    LABS:                        11.0   8.48  )-----------( 228      ( 13 Mar 2021 08:35 )             33.5     139  |  103  |  21<H>  ----------------------------<  225<H>  4.4   |  23  |  0.7    Ca    8.7      13 Mar 2021 08:35  Mg     2.2     03-13    TPro  6.7  /  Alb  3.4<L>  /  TBili  <0.2  /  DBili  x   /  AST  55<H>  /  ALT  38  /  AlkPhos  81  03-13    Serum Pro-Brain Natriuretic Peptide: 237 pg/mL (03-10-21 @ 22:24)    Trop 0.02, CKMB --, CK --, 03-12-21 @ 08:18    RADIOLOGY & ADDITIONAL TESTS:    Imaging or report Personally Reviewed:  [x] YES  [ ] NO  EKG reviewed: [x] YES  [ ] NO    Medications:  Standing  cefTRIAXone   IVPB 1000 milliGRAM(s) IV Intermittent every 24 hours  chlorhexidine 4% Liquid 1 Application(s) Topical daily  dexAMETHasone  Injectable 6 milliGRAM(s) IV Push daily  enoxaparin Injectable 40 milliGRAM(s) SubCutaneous two times a day  insulin glargine Injectable (LANTUS) 10 Unit(s) SubCutaneous at bedtime  insulin lispro (ADMELOG) corrective regimen sliding scale   SubCutaneous three times a day before meals  insulin lispro Injectable (ADMELOG) 3 Unit(s) SubCutaneous three times a day before meals  levoFLOXacin IVPB      levoFLOXacin IVPB 750 milliGRAM(s) IV Intermittent every 24 hours  pantoprazole    Tablet 40 milliGRAM(s) Oral before breakfast  remdesivir  IVPB   IV Intermittent   remdesivir  IVPB 100 milliGRAM(s) IV Intermittent every 24 hours  sodium chloride 0.9%. 1000 milliLiter(s) IV Continuous <Continuous>    PRN Meds  acetaminophen   Tablet .. 650 milliGRAM(s) Oral every 6 hours PRN  guaifenesin/dextromethorphan  Syrup 10 milliLiter(s) Oral every 6 hours PRN             Go for blood tests as directed. Your doctor will do lab tests at regular visits to monitor the effects of this medicine. Please follow up with your doctor and keep your health care provider appointments.

## 2021-03-15 LAB
ALBUMIN SERPL ELPH-MCNC: 3.4 G/DL — LOW (ref 3.5–5.2)
ALP SERPL-CCNC: 86 U/L — SIGNIFICANT CHANGE UP (ref 30–115)
ALT FLD-CCNC: 49 U/L — HIGH (ref 0–41)
ANION GAP SERPL CALC-SCNC: 11 MMOL/L — SIGNIFICANT CHANGE UP (ref 7–14)
AST SERPL-CCNC: 58 U/L — HIGH (ref 0–41)
BASOPHILS # BLD AUTO: 0.03 K/UL — SIGNIFICANT CHANGE UP (ref 0–0.2)
BASOPHILS NFR BLD AUTO: 0.2 % — SIGNIFICANT CHANGE UP (ref 0–1)
BILIRUB SERPL-MCNC: 0.3 MG/DL — SIGNIFICANT CHANGE UP (ref 0.2–1.2)
BUN SERPL-MCNC: 18 MG/DL — SIGNIFICANT CHANGE UP (ref 10–20)
CALCIUM SERPL-MCNC: 8.7 MG/DL — SIGNIFICANT CHANGE UP (ref 8.5–10.1)
CHLORIDE SERPL-SCNC: 102 MMOL/L — SIGNIFICANT CHANGE UP (ref 98–110)
CO2 SERPL-SCNC: 26 MMOL/L — SIGNIFICANT CHANGE UP (ref 17–32)
CREAT SERPL-MCNC: 0.7 MG/DL — SIGNIFICANT CHANGE UP (ref 0.7–1.5)
EOSINOPHIL # BLD AUTO: 0.01 K/UL — SIGNIFICANT CHANGE UP (ref 0–0.7)
EOSINOPHIL NFR BLD AUTO: 0.1 % — SIGNIFICANT CHANGE UP (ref 0–8)
GLUCOSE BLDC GLUCOMTR-MCNC: 126 MG/DL — HIGH (ref 70–99)
GLUCOSE BLDC GLUCOMTR-MCNC: 188 MG/DL — HIGH (ref 70–99)
GLUCOSE BLDC GLUCOMTR-MCNC: 195 MG/DL — HIGH (ref 70–99)
GLUCOSE BLDC GLUCOMTR-MCNC: 275 MG/DL — HIGH (ref 70–99)
GLUCOSE SERPL-MCNC: 180 MG/DL — HIGH (ref 70–99)
HCT VFR BLD CALC: 33.9 % — LOW (ref 37–47)
HGB BLD-MCNC: 10.8 G/DL — LOW (ref 12–16)
IMM GRANULOCYTES NFR BLD AUTO: 2 % — HIGH (ref 0.1–0.3)
LYMPHOCYTES # BLD AUTO: 0.81 K/UL — LOW (ref 1.2–3.4)
LYMPHOCYTES # BLD AUTO: 5.7 % — LOW (ref 20.5–51.1)
MAGNESIUM SERPL-MCNC: 2.1 MG/DL — SIGNIFICANT CHANGE UP (ref 1.8–2.4)
MCHC RBC-ENTMCNC: 29.1 PG — SIGNIFICANT CHANGE UP (ref 27–31)
MCHC RBC-ENTMCNC: 31.9 G/DL — LOW (ref 32–37)
MCV RBC AUTO: 91.4 FL — SIGNIFICANT CHANGE UP (ref 81–99)
MONOCYTES # BLD AUTO: 0.66 K/UL — HIGH (ref 0.1–0.6)
MONOCYTES NFR BLD AUTO: 4.7 % — SIGNIFICANT CHANGE UP (ref 1.7–9.3)
NEUTROPHILS # BLD AUTO: 12.36 K/UL — HIGH (ref 1.4–6.5)
NEUTROPHILS NFR BLD AUTO: 87.3 % — HIGH (ref 42.2–75.2)
NRBC # BLD: 0 /100 WBCS — SIGNIFICANT CHANGE UP (ref 0–0)
PHOSPHATE SERPL-MCNC: 3 MG/DL — SIGNIFICANT CHANGE UP (ref 2.1–4.9)
PLATELET # BLD AUTO: 188 K/UL — SIGNIFICANT CHANGE UP (ref 130–400)
POTASSIUM SERPL-MCNC: 4.3 MMOL/L — SIGNIFICANT CHANGE UP (ref 3.5–5)
POTASSIUM SERPL-SCNC: 4.3 MMOL/L — SIGNIFICANT CHANGE UP (ref 3.5–5)
PROT SERPL-MCNC: 6.4 G/DL — SIGNIFICANT CHANGE UP (ref 6–8)
RBC # BLD: 3.71 M/UL — LOW (ref 4.2–5.4)
RBC # FLD: 13.4 % — SIGNIFICANT CHANGE UP (ref 11.5–14.5)
SODIUM SERPL-SCNC: 139 MMOL/L — SIGNIFICANT CHANGE UP (ref 135–146)
WBC # BLD: 14.15 K/UL — HIGH (ref 4.8–10.8)
WBC # FLD AUTO: 14.15 K/UL — HIGH (ref 4.8–10.8)

## 2021-03-15 PROCEDURE — 99233 SBSQ HOSP IP/OBS HIGH 50: CPT

## 2021-03-15 RX ORDER — AMLODIPINE BESYLATE 2.5 MG/1
5 TABLET ORAL DAILY
Refills: 0 | Status: DISCONTINUED | OUTPATIENT
Start: 2021-03-15 | End: 2021-03-16

## 2021-03-15 RX ORDER — INSULIN GLARGINE 100 [IU]/ML
20 INJECTION, SOLUTION SUBCUTANEOUS AT BEDTIME
Refills: 0 | Status: DISCONTINUED | OUTPATIENT
Start: 2021-03-15 | End: 2021-04-01

## 2021-03-15 RX ADMIN — Medication 1: at 08:08

## 2021-03-15 RX ADMIN — Medication 6 MILLIGRAM(S): at 05:32

## 2021-03-15 RX ADMIN — Medication 650 MILLIGRAM(S): at 11:57

## 2021-03-15 RX ADMIN — ENOXAPARIN SODIUM 40 MILLIGRAM(S): 100 INJECTION SUBCUTANEOUS at 18:04

## 2021-03-15 RX ADMIN — Medication 3: at 11:55

## 2021-03-15 RX ADMIN — REMDESIVIR 500 MILLIGRAM(S): 5 INJECTION INTRAVENOUS at 03:16

## 2021-03-15 RX ADMIN — AMLODIPINE BESYLATE 5 MILLIGRAM(S): 2.5 TABLET ORAL at 18:04

## 2021-03-15 RX ADMIN — INSULIN GLARGINE 20 UNIT(S): 100 INJECTION, SOLUTION SUBCUTANEOUS at 21:31

## 2021-03-15 RX ADMIN — Medication 3 UNIT(S): at 16:58

## 2021-03-15 RX ADMIN — PANTOPRAZOLE SODIUM 40 MILLIGRAM(S): 20 TABLET, DELAYED RELEASE ORAL at 05:32

## 2021-03-15 RX ADMIN — Medication 3 UNIT(S): at 08:08

## 2021-03-15 RX ADMIN — Medication 650 MILLIGRAM(S): at 12:20

## 2021-03-15 RX ADMIN — Medication 3 UNIT(S): at 11:55

## 2021-03-15 RX ADMIN — CEFTRIAXONE 100 MILLIGRAM(S): 500 INJECTION, POWDER, FOR SOLUTION INTRAMUSCULAR; INTRAVENOUS at 12:24

## 2021-03-15 RX ADMIN — Medication 1: at 16:57

## 2021-03-15 RX ADMIN — ENOXAPARIN SODIUM 40 MILLIGRAM(S): 100 INJECTION SUBCUTANEOUS at 05:32

## 2021-03-15 NOTE — PROGRESS NOTE ADULT - SUBJECTIVE AND OBJECTIVE BOX
PAULA ELMORE 56y Female  MRN#: [id number]   Hospital Day: 5d    SUBJECTIVE  Patient is a 56y old Female who presents with a chief complaint of Shortness of breath (14 Mar 2021 13:54)  Currently admitted to medicine with the primary diagnosis of Acute respiratory failure with hypoxia      INTERVAL HPI AND OVERNIGHT EVENTS:  Patient was examined and seen at bedside. Pt on HFNC at 60/100%. No acute o/n events    REVIEW OF SYMPTOMS:  Denies any pain, n/v/d/c, urinary or bowel complaints.    OBJECTIVE  PAST MEDICAL & SURGICAL HISTORY    ALLERGIES:  Allergy Status Unknown    MEDICATIONS:  STANDING MEDICATIONS  cefTRIAXone   IVPB 1000 milliGRAM(s) IV Intermittent every 24 hours  chlorhexidine 4% Liquid 1 Application(s) Topical daily  dexAMETHasone  Injectable 6 milliGRAM(s) IV Push daily  enoxaparin Injectable 40 milliGRAM(s) SubCutaneous two times a day  insulin glargine Injectable (LANTUS) 10 Unit(s) SubCutaneous at bedtime  insulin lispro (ADMELOG) corrective regimen sliding scale   SubCutaneous three times a day before meals  insulin lispro Injectable (ADMELOG) 3 Unit(s) SubCutaneous three times a day before meals  levoFLOXacin IVPB      levoFLOXacin IVPB 750 milliGRAM(s) IV Intermittent every 24 hours  pantoprazole    Tablet 40 milliGRAM(s) Oral before breakfast  sodium chloride 0.9%. 1000 milliLiter(s) IV Continuous <Continuous>    PRN MEDICATIONS  acetaminophen   Tablet .. 650 milliGRAM(s) Oral every 6 hours PRN  guaifenesin/dextromethorphan  Syrup 10 milliLiter(s) Oral every 6 hours PRN      VITAL SIGNS: Last 24 Hours  T(C): 37 (15 Mar 2021 05:41), Max: 37.6 (14 Mar 2021 12:19)  T(F): 98.6 (15 Mar 2021 05:41), Max: 99.7 (14 Mar 2021 12:19)  HR: 94 (15 Mar 2021 05:41) (94 - 109)  BP: 159/71 (15 Mar 2021 05:41) (120/58 - 159/71)  BP(mean): 99 (14 Mar 2021 16:30) (99 - 99)  RR: 20 (15 Mar 2021 05:41) (20 - 20)  SpO2: 96% (15 Mar 2021 05:41) (93% - 98%)    LABS:                        10.8   14.15 )-----------( 188      ( 15 Mar 2021 07:53 )             33.9                         RADIOLOGY:      PHYSICAL EXAM:  CONSTITUTIONAL: No acute distress, well-developed, AAOx3  HEAD: Atraumatic, normocephalic  EYES: EOM intact  ENT: Supple, no JVD  PULMONARY: Clear to auscultation bilaterally; no wheezes, rales. Breathing non-labored  CARDIOVASCULAR: Regular rate and rhythm; no murmurs, rubs, or gallops  GASTROINTESTINAL: Soft, non-tender, non-distended; bowel sounds present  MUSCULOSKELETAL: 2+ peripheral pulses; no clubbing, no cyanosis, no edema  NEUROLOGY: non-focal  SKIN: No rashes or lesions; warm and dry    ASSESSMENT & PLAN  #    PAST MEDICAL & SURGICAL HISTORY:      #Misc  - DVT Prophylaxis:  - GI Prophylaxis:  - Diet:  - Activity:  - IV Fluids:  - Code Status:    Dispo: PAULA ELMORE 56y Female  MRN#: [id number]   Hospital Day: 5d    SUBJECTIVE  Patient is a 56y old Female who presents with a chief complaint of Shortness of breath (14 Mar 2021 13:54)  Currently admitted to medicine with the primary diagnosis of Acute respiratory failure with hypoxia      INTERVAL HPI AND OVERNIGHT EVENTS:  Patient was examined and seen at bedside. Pt on HFNC at 60/100%. No acute o/n events    REVIEW OF SYMPTOMS:  Denies any pain, n/v/d/c, urinary or bowel complaints.    OBJECTIVE  PAST MEDICAL & SURGICAL HISTORY    ALLERGIES:  Allergy Status Unknown    MEDICATIONS:  STANDING MEDICATIONS  cefTRIAXone   IVPB 1000 milliGRAM(s) IV Intermittent every 24 hours  chlorhexidine 4% Liquid 1 Application(s) Topical daily  dexAMETHasone  Injectable 6 milliGRAM(s) IV Push daily  enoxaparin Injectable 40 milliGRAM(s) SubCutaneous two times a day  insulin glargine Injectable (LANTUS) 10 Unit(s) SubCutaneous at bedtime  insulin lispro (ADMELOG) corrective regimen sliding scale   SubCutaneous three times a day before meals  insulin lispro Injectable (ADMELOG) 3 Unit(s) SubCutaneous three times a day before meals  levoFLOXacin IVPB      levoFLOXacin IVPB 750 milliGRAM(s) IV Intermittent every 24 hours  pantoprazole    Tablet 40 milliGRAM(s) Oral before breakfast  sodium chloride 0.9%. 1000 milliLiter(s) IV Continuous <Continuous>    PRN MEDICATIONS  acetaminophen   Tablet .. 650 milliGRAM(s) Oral every 6 hours PRN  guaifenesin/dextromethorphan  Syrup 10 milliLiter(s) Oral every 6 hours PRN      VITAL SIGNS: Last 24 Hours  T(C): 37 (15 Mar 2021 05:41), Max: 37.6 (14 Mar 2021 12:19)  T(F): 98.6 (15 Mar 2021 05:41), Max: 99.7 (14 Mar 2021 12:19)  HR: 94 (15 Mar 2021 05:41) (94 - 109)  BP: 159/71 (15 Mar 2021 05:41) (120/58 - 159/71)  BP(mean): 99 (14 Mar 2021 16:30) (99 - 99)  RR: 20 (15 Mar 2021 05:41) (20 - 20)  SpO2: 96% (15 Mar 2021 05:41) (93% - 98%)    LABS:                        10.8   14.15 )-----------( 188      ( 15 Mar 2021 07:53 )             33.9                         RADIOLOGY:      PHYSICAL EXAM:  CONSTITUTIONAL: No acute distress, well-developed, AAOx3  HEAD: Atraumatic, normocephalic  EYES: EOM intact  ENT: Supple, no JVD  PULMONARY: Clear to auscultation bilaterally; no wheezes, rales. Breathing non-labored  CARDIOVASCULAR: Regular rate and rhythm; no murmurs  GASTROINTESTINAL: Soft, non-tender, non-distended  MUSCULOSKELETAL: Moves 4/4 extremities no cyanosis, no edema  NEUROLOGY: non-focal  SKIN: Intact    ASSESSMENT & PLAN  #    PAST MEDICAL & SURGICAL HISTORY:   PAULA ELMORE 56y Female  MRN#: [id number]   Hospital Day: 5d    SUBJECTIVE  Patient is a 56y old Female who presents with a chief complaint of Shortness of breath (14 Mar 2021 13:54)  Currently admitted to medicine with the primary diagnosis of Acute respiratory failure with hypoxia      INTERVAL HPI AND OVERNIGHT EVENTS:  Patient was examined and seen at bedside. Pt on HFNC at 60/100%. No acute o/n events    REVIEW OF SYMPTOMS:  Denies any pain, n/v/d/c, urinary or bowel complaints.    OBJECTIVE  PAST MEDICAL & SURGICAL HISTORY    ALLERGIES:  Allergy Status Unknown    MEDICATIONS:  STANDING MEDICATIONS  cefTRIAXone   IVPB 1000 milliGRAM(s) IV Intermittent every 24 hours  chlorhexidine 4% Liquid 1 Application(s) Topical daily  dexAMETHasone  Injectable 6 milliGRAM(s) IV Push daily  enoxaparin Injectable 40 milliGRAM(s) SubCutaneous two times a day  insulin glargine Injectable (LANTUS) 10 Unit(s) SubCutaneous at bedtime  insulin lispro (ADMELOG) corrective regimen sliding scale   SubCutaneous three times a day before meals  insulin lispro Injectable (ADMELOG) 3 Unit(s) SubCutaneous three times a day before meals  levoFLOXacin IVPB      levoFLOXacin IVPB 750 milliGRAM(s) IV Intermittent every 24 hours  pantoprazole    Tablet 40 milliGRAM(s) Oral before breakfast  sodium chloride 0.9%. 1000 milliLiter(s) IV Continuous <Continuous>    PRN MEDICATIONS  acetaminophen   Tablet .. 650 milliGRAM(s) Oral every 6 hours PRN  guaifenesin/dextromethorphan  Syrup 10 milliLiter(s) Oral every 6 hours PRN      VITAL SIGNS: Last 24 Hours  T(C): 37 (15 Mar 2021 05:41), Max: 37.6 (14 Mar 2021 12:19)  T(F): 98.6 (15 Mar 2021 05:41), Max: 99.7 (14 Mar 2021 12:19)  HR: 94 (15 Mar 2021 05:41) (94 - 109)  BP: 159/71 (15 Mar 2021 05:41) (120/58 - 159/71)  BP(mean): 99 (14 Mar 2021 16:30) (99 - 99)  RR: 20 (15 Mar 2021 05:41) (20 - 20)  SpO2: 96% (15 Mar 2021 05:41) (93% - 98%)    LABS:                        10.8   14.15 )-----------( 188      ( 15 Mar 2021 07:53 )             33.9                         RADIOLOGY:      PHYSICAL EXAM:  CONSTITUTIONAL: No acute distress, well-developed, AAOx3  HEAD: Atraumatic, normocephalic  EYES: EOM intact  ENT: Supple, no JVD  PULMONARY: Clear to auscultation bilaterally; no wheezes, rales. Breathing non-labored  CARDIOVASCULAR: Regular rate and rhythm; no murmurs  GASTROINTESTINAL: Soft, non-tender, non-distended  MUSCULOSKELETAL: Moves 4/4 extremities no cyanosis, no edema  NEUROLOGY: non-focal  SKIN: Intact    ASSESSMENT & PLAN  56-year-old, female patient, PMHx: Hypertension, presented to the ED for Shortness of breath. Histoy goes back to 2 days prior to presentation when the patient started having dyspnea. New-onset, aggravated by exertion, no alleviating factors. No orthopnea. The patient had an associated cough, productive of whitish sputum. She denies any fever ( but was febrile in the ED). No sore throat, no anosmia no loss of taste. No skin contact, no recent travel.     #Acute Hypoxic Respiratory failure secondary to SARS-CoV-2 Pneumonia:   - PCR: Positive  - Currently on HFNC 60/100--wean as tolerated   - c/w Dexamethasone 6mg IV once daily day #6  - Remdesevir 100mg IV once daily, day #5.   - Trend Inflammatory Markers.   - c/w rocephin and levaquin  - COVID-19 antibodies negative  - s/p Convalescent Plasma   - ID following     #Very high D-dimer >5000, now 11,000  - LE duplex, negative for DVT    #High Troponin initially, down trending  - Troponin: 0.02 at admission then .03, .04, now .02  - No Active Chest pain  - EKG: Sinus Tachycardia    #Transaminitis:   - AST:77 ALT:31, down trended since admission    #Hypertension:   - Pt does not know home regimen  - Thus far BP tolerable off medications, f/u BPs    #Misc:   - DVT prophylaxis: Lovenox 40mg BID  - GI prophy: Pantoprazole 40mg po once daily  - Diet: DASH/TLC  - Dispo: Acute      PAST MEDICAL & SURGICAL HISTORY:

## 2021-03-15 NOTE — PROGRESS NOTE ADULT - ASSESSMENT
56-year-old, female patient, PMHx: Hypertension, presented to the ED for Shortness of breath and found to have    Acute Hypoxemic respiratory failure secondary to COVID 19 PNA  Suspected superimposed bacterial PNA  transaminitis  currently on HFNC    Blood cx NGTD  Procal 21---> 4.08, on ceftriaxone and levaquin  f/u urine strep and legionella   on dexamethasone 6mg daily, started 3/10  on RDV, started 3/11  ID consulted,  too early for Toci  Ddimer 5287--->83468, LE duplex 3/12 negative for DVT  C/w lovenox 40 Q12H   trend inflammatory markers  taper down o2 as tolerated    Hypertension  Start [id number] daily     New onset DM2   Steroid induced hyperglycemia  Hba1c 6.9, patient was not being treated for DM as OP  c/w insulin for now given that she is on steroids  monitor FS    #Progress Note Handoff  Pending (specify):  Consults_ none pending   Tests_daly morning lab  Family discussion: none today  Disposition: Home___/SNF___/Other________/Unknown at this time___X_____

## 2021-03-15 NOTE — PROGRESS NOTE ADULT - SUBJECTIVE AND OBJECTIVE BOX
PAULA ELMORE  56y Female    CHIEF COMPLAINT:    Patient is a 56y old  Female who presents with a chief complaint of Shortness of breath      INTERVAL HPI/OVERNIGHT EVENTS:    Patient seen and examined. No acute events overnight.   Condition improving.     ROS: All other systems are negative.    Vital Signs:    ICU Vital Signs Last 24 Hrs  T(C): 37.1 (15 Mar 2021 12:15), Max: 37.3 (14 Mar 2021 16:30)  T(F): 98.8 (15 Mar 2021 12:15), Max: 99.2 (14 Mar 2021 16:30)  HR: 96 (15 Mar 2021 12:15) (91 - 109)  BP: 144/67 (15 Mar 2021 12:15) (102/69 - 159/71)  BP(mean): 99 (14 Mar 2021 16:30) (99 - 99)  RR: 20 (15 Mar 2021 12:15) (20 - 20)  SpO2: 94% (15 Mar 2021 12:15) (93% - 98%)      PHYSICAL EXAM:    GENERAL:  NAD  SKIN: No rashes or lesions  HEENT: Atraumatic. Normocephalic.   NECK: Supple, No JVD.  PULMONARY: decreased breath sounds b/l. No wheezing.    CVS: Normal S1, S2. Rate and Rhythm are regular. No murmurs.  ABDOMEN/GI: Soft, Nontender, Nondistended; BS present  MSK: No clubbing or cyanosis  NEUROLOGIC: moves all extremities  PSYCH: Alert & oriented x 3, normal affect    Consultant(s) Notes Reviewed:  [x ] YES  [ ] NO  Care Discussed with Consultants/Other Providers [ x] YES  [ ] NO    LABS:                                          10.8   14.15 )-----------( 188      ( 15 Mar 2021 07:53 )             33.9     03-15    139  |  102  |  18  ----------------------------<  180<H>  4.3   |  26  |  0.7    Ca    8.7      15 Mar 2021 07:53  Phos  3.0     03-15  Mg     2.1     03-15    TPro  6.4  /  Alb  3.4<L>  /  TBili  0.3  /  DBili  x   /  AST  58<H>  /  ALT  49<H>  /  AlkPhos  86  03-15      RADIOLOGY & ADDITIONAL TESTS:    Imaging or report Personally Reviewed:  [x] YES  [ ] NO  EKG reviewed: [x] YES  [ ] NO    Medications:  Standing  cefTRIAXone   IVPB 1000 milliGRAM(s) IV Intermittent every 24 hours  chlorhexidine 4% Liquid 1 Application(s) Topical daily  dexAMETHasone  Injectable 6 milliGRAM(s) IV Push daily  enoxaparin Injectable 40 milliGRAM(s) SubCutaneous two times a day  insulin glargine Injectable (LANTUS) 10 Unit(s) SubCutaneous at bedtime  insulin lispro (ADMELOG) corrective regimen sliding scale   SubCutaneous three times a day before meals  insulin lispro Injectable (ADMELOG) 3 Unit(s) SubCutaneous three times a day before meals  levoFLOXacin IVPB      levoFLOXacin IVPB 750 milliGRAM(s) IV Intermittent every 24 hours  pantoprazole    Tablet 40 milliGRAM(s) Oral before breakfast  remdesivir  IVPB   IV Intermittent   remdesivir  IVPB 100 milliGRAM(s) IV Intermittent every 24 hours  sodium chloride 0.9%. 1000 milliLiter(s) IV Continuous <Continuous>    PRN Meds  acetaminophen   Tablet .. 650 milliGRAM(s) Oral every 6 hours PRN  guaifenesin/dextromethorphan  Syrup 10 milliLiter(s) Oral every 6 hours PRN

## 2021-03-16 LAB
ALBUMIN SERPL ELPH-MCNC: 3.5 G/DL — SIGNIFICANT CHANGE UP (ref 3.5–5.2)
ALP SERPL-CCNC: 102 U/L — SIGNIFICANT CHANGE UP (ref 30–115)
ALT FLD-CCNC: 52 U/L — HIGH (ref 0–41)
ANION GAP SERPL CALC-SCNC: 17 MMOL/L — HIGH (ref 7–14)
AST SERPL-CCNC: 55 U/L — HIGH (ref 0–41)
BASOPHILS # BLD AUTO: 0.05 K/UL — SIGNIFICANT CHANGE UP (ref 0–0.2)
BASOPHILS NFR BLD AUTO: 0.3 % — SIGNIFICANT CHANGE UP (ref 0–1)
BILIRUB SERPL-MCNC: 0.5 MG/DL — SIGNIFICANT CHANGE UP (ref 0.2–1.2)
BUN SERPL-MCNC: 19 MG/DL — SIGNIFICANT CHANGE UP (ref 10–20)
CALCIUM SERPL-MCNC: 9.2 MG/DL — SIGNIFICANT CHANGE UP (ref 8.5–10.1)
CHLORIDE SERPL-SCNC: 101 MMOL/L — SIGNIFICANT CHANGE UP (ref 98–110)
CO2 SERPL-SCNC: 25 MMOL/L — SIGNIFICANT CHANGE UP (ref 17–32)
CREAT SERPL-MCNC: 0.7 MG/DL — SIGNIFICANT CHANGE UP (ref 0.7–1.5)
CRP SERPL-MCNC: 139 MG/L — HIGH
CULTURE RESULTS: SIGNIFICANT CHANGE UP
CULTURE RESULTS: SIGNIFICANT CHANGE UP
D DIMER BLD IA.RAPID-MCNC: HIGH NG/ML DDU (ref 0–230)
EOSINOPHIL # BLD AUTO: 0.02 K/UL — SIGNIFICANT CHANGE UP (ref 0–0.7)
EOSINOPHIL NFR BLD AUTO: 0.1 % — SIGNIFICANT CHANGE UP (ref 0–8)
FERRITIN SERPL-MCNC: 1094 NG/ML — HIGH (ref 15–150)
GLUCOSE BLDC GLUCOMTR-MCNC: 123 MG/DL — HIGH (ref 70–99)
GLUCOSE BLDC GLUCOMTR-MCNC: 150 MG/DL — HIGH (ref 70–99)
GLUCOSE BLDC GLUCOMTR-MCNC: 161 MG/DL — HIGH (ref 70–99)
GLUCOSE BLDC GLUCOMTR-MCNC: 231 MG/DL — HIGH (ref 70–99)
GLUCOSE BLDC GLUCOMTR-MCNC: 243 MG/DL — HIGH (ref 70–99)
GLUCOSE SERPL-MCNC: 165 MG/DL — HIGH (ref 70–99)
HCT VFR BLD CALC: 37.6 % — SIGNIFICANT CHANGE UP (ref 37–47)
HGB BLD-MCNC: 12 G/DL — SIGNIFICANT CHANGE UP (ref 12–16)
IMM GRANULOCYTES NFR BLD AUTO: 3.5 % — HIGH (ref 0.1–0.3)
LYMPHOCYTES # BLD AUTO: 0.93 K/UL — LOW (ref 1.2–3.4)
LYMPHOCYTES # BLD AUTO: 5.1 % — LOW (ref 20.5–51.1)
MAGNESIUM SERPL-MCNC: 2.1 MG/DL — SIGNIFICANT CHANGE UP (ref 1.8–2.4)
MCHC RBC-ENTMCNC: 29 PG — SIGNIFICANT CHANGE UP (ref 27–31)
MCHC RBC-ENTMCNC: 31.9 G/DL — LOW (ref 32–37)
MCV RBC AUTO: 90.8 FL — SIGNIFICANT CHANGE UP (ref 81–99)
MONOCYTES # BLD AUTO: 0.82 K/UL — HIGH (ref 0.1–0.6)
MONOCYTES NFR BLD AUTO: 4.5 % — SIGNIFICANT CHANGE UP (ref 1.7–9.3)
NEUTROPHILS # BLD AUTO: 15.82 K/UL — HIGH (ref 1.4–6.5)
NEUTROPHILS NFR BLD AUTO: 86.5 % — HIGH (ref 42.2–75.2)
NRBC # BLD: 0 /100 WBCS — SIGNIFICANT CHANGE UP (ref 0–0)
PLATELET # BLD AUTO: 258 K/UL — SIGNIFICANT CHANGE UP (ref 130–400)
POTASSIUM SERPL-MCNC: 4.5 MMOL/L — SIGNIFICANT CHANGE UP (ref 3.5–5)
POTASSIUM SERPL-SCNC: 4.5 MMOL/L — SIGNIFICANT CHANGE UP (ref 3.5–5)
PROCALCITONIN SERPL-MCNC: 0.52 NG/ML — HIGH (ref 0.02–0.1)
PROT SERPL-MCNC: 7 G/DL — SIGNIFICANT CHANGE UP (ref 6–8)
RBC # BLD: 4.14 M/UL — LOW (ref 4.2–5.4)
RBC # FLD: 13.3 % — SIGNIFICANT CHANGE UP (ref 11.5–14.5)
SODIUM SERPL-SCNC: 143 MMOL/L — SIGNIFICANT CHANGE UP (ref 135–146)
SPECIMEN SOURCE: SIGNIFICANT CHANGE UP
SPECIMEN SOURCE: SIGNIFICANT CHANGE UP
WBC # BLD: 18.28 K/UL — HIGH (ref 4.8–10.8)
WBC # FLD AUTO: 18.28 K/UL — HIGH (ref 4.8–10.8)

## 2021-03-16 PROCEDURE — 99233 SBSQ HOSP IP/OBS HIGH 50: CPT

## 2021-03-16 PROCEDURE — 93010 ELECTROCARDIOGRAM REPORT: CPT

## 2021-03-16 PROCEDURE — 71045 X-RAY EXAM CHEST 1 VIEW: CPT | Mod: 26

## 2021-03-16 RX ORDER — ENOXAPARIN SODIUM 100 MG/ML
100 INJECTION SUBCUTANEOUS
Refills: 0 | Status: DISCONTINUED | OUTPATIENT
Start: 2021-03-16 | End: 2021-03-24

## 2021-03-16 RX ORDER — METOPROLOL TARTRATE 50 MG
25 TABLET ORAL
Refills: 0 | Status: DISCONTINUED | OUTPATIENT
Start: 2021-03-16 | End: 2021-04-06

## 2021-03-16 RX ORDER — METOPROLOL TARTRATE 50 MG
25 TABLET ORAL ONCE
Refills: 0 | Status: COMPLETED | OUTPATIENT
Start: 2021-03-16 | End: 2021-03-16

## 2021-03-16 RX ADMIN — Medication 6 MILLIGRAM(S): at 05:13

## 2021-03-16 RX ADMIN — ENOXAPARIN SODIUM 100 MILLIGRAM(S): 100 INJECTION SUBCUTANEOUS at 17:08

## 2021-03-16 RX ADMIN — PANTOPRAZOLE SODIUM 40 MILLIGRAM(S): 20 TABLET, DELAYED RELEASE ORAL at 06:01

## 2021-03-16 RX ADMIN — CEFTRIAXONE 100 MILLIGRAM(S): 500 INJECTION, POWDER, FOR SOLUTION INTRAMUSCULAR; INTRAVENOUS at 13:17

## 2021-03-16 RX ADMIN — Medication 3 UNIT(S): at 16:18

## 2021-03-16 RX ADMIN — CHLORHEXIDINE GLUCONATE 1 APPLICATION(S): 213 SOLUTION TOPICAL at 12:05

## 2021-03-16 RX ADMIN — AMLODIPINE BESYLATE 5 MILLIGRAM(S): 2.5 TABLET ORAL at 05:13

## 2021-03-16 RX ADMIN — Medication 3 UNIT(S): at 12:01

## 2021-03-16 RX ADMIN — Medication 25 MILLIGRAM(S): at 14:45

## 2021-03-16 RX ADMIN — ENOXAPARIN SODIUM 40 MILLIGRAM(S): 100 INJECTION SUBCUTANEOUS at 05:13

## 2021-03-16 RX ADMIN — Medication 25 MILLIGRAM(S): at 17:08

## 2021-03-16 RX ADMIN — Medication 2: at 16:18

## 2021-03-16 RX ADMIN — INSULIN GLARGINE 20 UNIT(S): 100 INJECTION, SOLUTION SUBCUTANEOUS at 21:14

## 2021-03-16 RX ADMIN — Medication 2: at 12:01

## 2021-03-16 RX ADMIN — Medication 3 UNIT(S): at 08:02

## 2021-03-16 NOTE — PROGRESS NOTE ADULT - SUBJECTIVE AND OBJECTIVE BOX
PAULA ELMORE 56y Female  MRN#: [id number]   Hospital Day: 6d    SUBJECTIVE  Patient is a 56y old Female who presents with a chief complaint of Shortness of breath (15 Mar 2021 12:52)  Currently admitted to medicine with the primary diagnosis of Acute respiratory failure with hypoxia      INTERVAL HPI AND OVERNIGHT EVENTS:  Patient was examined and seen at bedside. This morning she is resting comfortably in bed and reports no issues or overnight events.    REVIEW OF SYMPTOMS:  Denies any pain, urinary, or bowel complaints. Endorses adequate PO intake    OBJECTIVE  PAST MEDICAL & SURGICAL HISTORY    ALLERGIES:  Allergy Status Unknown    MEDICATIONS:  STANDING MEDICATIONS  amLODIPine   Tablet 5 milliGRAM(s) Oral daily  cefTRIAXone   IVPB 1000 milliGRAM(s) IV Intermittent every 24 hours  chlorhexidine 4% Liquid 1 Application(s) Topical daily  dexAMETHasone  Injectable 6 milliGRAM(s) IV Push daily  enoxaparin Injectable 100 milliGRAM(s) SubCutaneous two times a day  insulin glargine Injectable (LANTUS) 20 Unit(s) SubCutaneous at bedtime  insulin lispro (ADMELOG) corrective regimen sliding scale   SubCutaneous three times a day before meals  insulin lispro Injectable (ADMELOG) 3 Unit(s) SubCutaneous three times a day before meals  levoFLOXacin IVPB      levoFLOXacin IVPB 750 milliGRAM(s) IV Intermittent every 24 hours  metoprolol tartrate 25 milliGRAM(s) Oral two times a day  pantoprazole    Tablet 40 milliGRAM(s) Oral before breakfast    PRN MEDICATIONS  acetaminophen   Tablet .. 650 milliGRAM(s) Oral every 6 hours PRN  guaifenesin/dextromethorphan  Syrup 10 milliLiter(s) Oral every 6 hours PRN      VITAL SIGNS: Last 24 Hours  T(C): 36.4 (16 Mar 2021 05:10), Max: 37.7 (15 Mar 2021 20:05)  T(F): 97.6 (16 Mar 2021 05:10), Max: 99.8 (15 Mar 2021 20:05)  HR: 117 (16 Mar 2021 06:22) (90 - 117)  BP: 138/63 (16 Mar 2021 06:22) (138/63 - 185/95)  BP(mean): --  RR: 24 (16 Mar 2021 05:10) (20 - 24)  SpO2: 96% (16 Mar 2021 05:10) (87% - 96%)    LABS:                        12.0   18.28 )-----------( 258      ( 16 Mar 2021 07:58 )             37.6     03-16    143  |  101  |  19  ----------------------------<  165<H>  4.5   |  25  |  0.7    Ca    9.2      16 Mar 2021 07:58  Phos  3.0     03-15  Mg     2.1     03-16    TPro  7.0  /  Alb  3.5  /  TBili  0.5  /  DBili  x   /  AST  55<H>  /  ALT  52<H>  /  AlkPhos  102  03-16        RADIOLOGY:      PHYSICAL EXAM:  CONSTITUTIONAL: No acute distress, well-developed, AAOx3  HEAD: Atraumatic, normocephalic  EYES: EOM intact  ENT: Supple, no JVD  PULMONARY: Clear to auscultation bilaterally; no wheezes, rales. Breathing non-labored  CARDIOVASCULAR: Regular rate and rhythm; no murmurs  GASTROINTESTINAL: Soft, non-tender, non-distended  MUSCULOSKELETAL: Moves 4/4 extremities no cyanosis, no edema  NEUROLOGY: non-focal  SKIN: Intact    ASSESSMENT & PLAN  56-year-old, female patient, PMHx: Hypertension, presented to the ED for Shortness of breath. Histoy goes back to 2 days prior to presentation when the patient started having dyspnea. New-onset, aggravated by exertion, no alleviating factors. No orthopnea. The patient had an associated cough, productive of whitish sputum. She denies any fever ( but was febrile in the ED). No sore throat, no anosmia no loss of taste. No skin contact, no recent travel.     #Acute Hypoxic Respiratory failure secondary to SARS-CoV-2 Pneumonia:   - Currently on HFNC 55/90--wean as tolerated   - c/w Dexamethasone 6mg IV qd  - Remdesevir 100mg IV qd   - Trend Inflammatory Markers.   - c/w rocephin and levaquin (QTc 449 today)  - COVID-19 antibodies negative  - s/p Convalescent Plasma   - ID following     #Elevated D-dimer--uptrending  - LE duplex, negative for DVT  - Therapeutic lovenox started    #Hypertension:   - Pt does not know home regimen  - Pt has been treated with norvasc 5mg qd thus far (error in yesterday's note re: BP tx)  - lopressor 25mg BID added    #Elevated Troponin --resolved  - Troponin: 0.02 at admission then .03, .04, now .02  - No Active Chest pain  - EKG: Sinus Tachycardia    #Transaminitis:   - AST:77 ALT:31, down trended since admission    #Misc:   - DVT prophylaxis: Lovenox 100mg BID  - GI prophy: Pantoprazole 40mg QD  - Diet: DASH/TLC  - Dispo: Acute    PAST MEDICAL & SURGICAL HISTORY: PAULA ELMORE 56y Female  MRN#: [id number]   Hospital Day: 6d    SUBJECTIVE  Patient is a 56y old Female who presents with a chief complaint of Shortness of breath (15 Mar 2021 12:52)  Currently admitted to medicine with the primary diagnosis of Acute respiratory failure with hypoxia      INTERVAL HPI AND OVERNIGHT EVENTS:  Patient was examined and seen at bedside. This morning she is resting comfortably in bed and reports no issues or overnight events.    REVIEW OF SYMPTOMS:  Denies any pain, urinary, or bowel complaints. Endorses adequate PO intake.    OBJECTIVE  PAST MEDICAL & SURGICAL HISTORY    ALLERGIES:  Allergy Status Unknown    MEDICATIONS:  STANDING MEDICATIONS  amLODIPine   Tablet 5 milliGRAM(s) Oral daily  cefTRIAXone   IVPB 1000 milliGRAM(s) IV Intermittent every 24 hours  chlorhexidine 4% Liquid 1 Application(s) Topical daily  dexAMETHasone  Injectable 6 milliGRAM(s) IV Push daily  enoxaparin Injectable 100 milliGRAM(s) SubCutaneous two times a day  insulin glargine Injectable (LANTUS) 20 Unit(s) SubCutaneous at bedtime  insulin lispro (ADMELOG) corrective regimen sliding scale   SubCutaneous three times a day before meals  insulin lispro Injectable (ADMELOG) 3 Unit(s) SubCutaneous three times a day before meals  levoFLOXacin IVPB      levoFLOXacin IVPB 750 milliGRAM(s) IV Intermittent every 24 hours  metoprolol tartrate 25 milliGRAM(s) Oral two times a day  pantoprazole    Tablet 40 milliGRAM(s) Oral before breakfast    PRN MEDICATIONS  acetaminophen   Tablet .. 650 milliGRAM(s) Oral every 6 hours PRN  guaifenesin/dextromethorphan  Syrup 10 milliLiter(s) Oral every 6 hours PRN      VITAL SIGNS: Last 24 Hours  T(C): 36.4 (16 Mar 2021 05:10), Max: 37.7 (15 Mar 2021 20:05)  T(F): 97.6 (16 Mar 2021 05:10), Max: 99.8 (15 Mar 2021 20:05)  HR: 117 (16 Mar 2021 06:22) (90 - 117)  BP: 138/63 (16 Mar 2021 06:22) (138/63 - 185/95)  BP(mean): --  RR: 24 (16 Mar 2021 05:10) (20 - 24)  SpO2: 96% (16 Mar 2021 05:10) (87% - 96%)    LABS:                        12.0   18.28 )-----------( 258      ( 16 Mar 2021 07:58 )             37.6     03-16    143  |  101  |  19  ----------------------------<  165<H>  4.5   |  25  |  0.7    Ca    9.2      16 Mar 2021 07:58  Phos  3.0     03-15  Mg     2.1     03-16    TPro  7.0  /  Alb  3.5  /  TBili  0.5  /  DBili  x   /  AST  55<H>  /  ALT  52<H>  /  AlkPhos  102  03-16        RADIOLOGY:      PHYSICAL EXAM:  CONSTITUTIONAL: No acute distress, well-developed, AAOx3  HEAD: Atraumatic, normocephalic  EYES: EOM intact  ENT: Supple, no JVD  PULMONARY: Clear to auscultation bilaterally; no wheezes, rales. Breathing non-labored  CARDIOVASCULAR: Regular rate and rhythm; no murmurs  GASTROINTESTINAL: Soft, non-tender, non-distended  MUSCULOSKELETAL: Moves 4/4 extremities no cyanosis, no edema  NEUROLOGY: non-focal  SKIN: Intact    ASSESSMENT & PLAN  56-year-old, female patient, PMHx: Hypertension, presented to the ED for Shortness of breath. Histoy goes back to 2 days prior to presentation when the patient started having dyspnea. New-onset, aggravated by exertion, no alleviating factors. No orthopnea. The patient had an associated cough, productive of whitish sputum. She denies any fever ( but was febrile in the ED). No sore throat, no anosmia no loss of taste. No skin contact, no recent travel.     #Acute Hypoxic Respiratory failure secondary to SARS-CoV-2 Pneumonia:   - Currently on HFNC 55/90--wean as tolerated   - c/w Dexamethasone 6mg IV qd  - Remdesevir 100mg IV qd   - Trend Inflammatory Markers.   - c/w rocephin and levaquin (QTc 449 today)  - COVID-19 antibodies negative  - s/p Convalescent Plasma   - ID following     #Elevated D-dimer--uptrending  - LE duplex, negative for DVT  - Therapeutic lovenox started    #Hypertension:   - Pt does not know home regimen  - Pt has been treated with norvasc 5mg qd thus far (error in yesterday's note re: BP tx)  - lopressor 25mg BID added    #Elevated Troponin --resolved  - Troponin: 0.02 at admission then .03, .04, now .02  - No Active Chest pain  - EKG: Sinus Tachycardia    #Transaminitis:   - AST:77 ALT:31, down trended since admission    #Misc:   - DVT prophylaxis: Lovenox 100mg BID  - GI prophy: Pantoprazole 40mg QD  - Diet: DASH/TLC  - Dispo: Acute    PAST MEDICAL & SURGICAL HISTORY: PAULA ELMORE 56y Female  MRN#: [id number]   Hospital Day: 6d    SUBJECTIVE  Patient is a 56y old Female who presents with a chief complaint of Shortness of breath (15 Mar 2021 12:52)  Currently admitted to medicine with the primary diagnosis of Acute respiratory failure with hypoxia      INTERVAL HPI AND OVERNIGHT EVENTS:  Patient was examined and seen at bedside. This morning she is resting comfortably in bed and reports no issues or overnight events.    REVIEW OF SYMPTOMS:  Denies any pain, urinary, or bowel complaints. Endorses adequate PO intake.    OBJECTIVE  PAST MEDICAL & SURGICAL HISTORY    ALLERGIES:  Allergy Status Unknown    MEDICATIONS:  STANDING MEDICATIONS  amLODIPine   Tablet 5 milliGRAM(s) Oral daily  cefTRIAXone   IVPB 1000 milliGRAM(s) IV Intermittent every 24 hours  chlorhexidine 4% Liquid 1 Application(s) Topical daily  dexAMETHasone  Injectable 6 milliGRAM(s) IV Push daily  enoxaparin Injectable 100 milliGRAM(s) SubCutaneous two times a day  insulin glargine Injectable (LANTUS) 20 Unit(s) SubCutaneous at bedtime  insulin lispro (ADMELOG) corrective regimen sliding scale   SubCutaneous three times a day before meals  insulin lispro Injectable (ADMELOG) 3 Unit(s) SubCutaneous three times a day before meals  levoFLOXacin IVPB      levoFLOXacin IVPB 750 milliGRAM(s) IV Intermittent every 24 hours  metoprolol tartrate 25 milliGRAM(s) Oral two times a day  pantoprazole    Tablet 40 milliGRAM(s) Oral before breakfast    PRN MEDICATIONS  acetaminophen   Tablet .. 650 milliGRAM(s) Oral every 6 hours PRN  guaifenesin/dextromethorphan  Syrup 10 milliLiter(s) Oral every 6 hours PRN      VITAL SIGNS: Last 24 Hours  T(C): 36.4 (16 Mar 2021 05:10), Max: 37.7 (15 Mar 2021 20:05)  T(F): 97.6 (16 Mar 2021 05:10), Max: 99.8 (15 Mar 2021 20:05)  HR: 117 (16 Mar 2021 06:22) (90 - 117)  BP: 138/63 (16 Mar 2021 06:22) (138/63 - 185/95)  BP(mean): --  RR: 24 (16 Mar 2021 05:10) (20 - 24)  SpO2: 96% (16 Mar 2021 05:10) (87% - 96%)    LABS:                        12.0   18.28 )-----------( 258      ( 16 Mar 2021 07:58 )             37.6     03-16    143  |  101  |  19  ----------------------------<  165<H>  4.5   |  25  |  0.7    Ca    9.2      16 Mar 2021 07:58  Phos  3.0     03-15  Mg     2.1     03-16    TPro  7.0  /  Alb  3.5  /  TBili  0.5  /  DBili  x   /  AST  55<H>  /  ALT  52<H>  /  AlkPhos  102  03-16        RADIOLOGY:      PHYSICAL EXAM:  CONSTITUTIONAL: No acute distress, well-developed, AAOx3  HEAD: Atraumatic, normocephalic  EYES: EOM intact  ENT: Supple, no JVD  PULMONARY: Clear to auscultation bilaterally; no wheezes, rales. Breathing non-labored  CARDIOVASCULAR: Regular rate and rhythm; no murmurs  GASTROINTESTINAL: Soft, non-tender, non-distended  MUSCULOSKELETAL: Moves 4/4 extremities no cyanosis, no edema  NEUROLOGY: non-focal  SKIN: Intact    ASSESSMENT & PLAN  56-year-old, female patient, PMHx: Hypertension, presented to the ED for Shortness of breath. Histoy goes back to 2 days prior to presentation when the patient started having dyspnea. New-onset, aggravated by exertion, no alleviating factors. No orthopnea. The patient had an associated cough, productive of whitish sputum. She denies any fever ( but was febrile in the ED). No sore throat, no anosmia no loss of taste. No skin contact, no recent travel.     #Acute Hypoxic Respiratory failure secondary to SARS-CoV-2 Pneumonia:   - Currently on HFNC 55/90--wean as tolerated   - c/w Dexamethasone 6mg IV qd  - Remdesevir 100mg IV qd   - Trend Inflammatory Markers.   - c/w rocephin and levaquin (QTc 449 today)  - COVID-19 antibodies negative  - s/p Convalescent Plasma   - ID following     #Elevated D-dimer--uptrending  - LE duplex on 3/12, negative for DVT  - Therapeutic lovenox started    #Hypertension:   - Pt does not know home regimen  - Pt has been treated with norvasc 5mg qd thus far (error in yesterday's note re: BP tx)  - lopressor 25mg BID added    #Elevated Troponin --resolved  - Troponin: 0.02 at admission then .03, .04, now .02  - No Active Chest pain  - EKG: Sinus Tachycardia    #Transaminitis:   - AST:77 ALT:31, down trended since admission    #Misc:   - DVT prophylaxis: Lovenox 100mg BID  - GI prophy: Pantoprazole 40mg QD  - Diet: DASH/TLC  - Dispo: Acute    PAST MEDICAL & SURGICAL HISTORY:

## 2021-03-17 LAB
GLUCOSE BLDC GLUCOMTR-MCNC: 133 MG/DL — HIGH (ref 70–99)
GLUCOSE BLDC GLUCOMTR-MCNC: 156 MG/DL — HIGH (ref 70–99)
GLUCOSE BLDC GLUCOMTR-MCNC: 193 MG/DL — HIGH (ref 70–99)
GLUCOSE BLDC GLUCOMTR-MCNC: 93 MG/DL — SIGNIFICANT CHANGE UP (ref 70–99)

## 2021-03-17 PROCEDURE — 99233 SBSQ HOSP IP/OBS HIGH 50: CPT

## 2021-03-17 RX ORDER — ACETAMINOPHEN 500 MG
650 TABLET ORAL ONCE
Refills: 0 | Status: COMPLETED | OUTPATIENT
Start: 2021-03-17 | End: 2021-03-17

## 2021-03-17 RX ADMIN — ENOXAPARIN SODIUM 100 MILLIGRAM(S): 100 INJECTION SUBCUTANEOUS at 05:02

## 2021-03-17 RX ADMIN — Medication 25 MILLIGRAM(S): at 05:02

## 2021-03-17 RX ADMIN — Medication 650 MILLIGRAM(S): at 04:38

## 2021-03-17 RX ADMIN — INSULIN GLARGINE 20 UNIT(S): 100 INJECTION, SOLUTION SUBCUTANEOUS at 21:09

## 2021-03-17 RX ADMIN — Medication 3 UNIT(S): at 16:49

## 2021-03-17 RX ADMIN — Medication 25 MILLIGRAM(S): at 17:26

## 2021-03-17 RX ADMIN — Medication 1: at 11:22

## 2021-03-17 RX ADMIN — Medication 650 MILLIGRAM(S): at 16:48

## 2021-03-17 RX ADMIN — CHLORHEXIDINE GLUCONATE 1 APPLICATION(S): 213 SOLUTION TOPICAL at 11:57

## 2021-03-17 RX ADMIN — Medication 3 UNIT(S): at 11:23

## 2021-03-17 RX ADMIN — Medication 6 MILLIGRAM(S): at 05:02

## 2021-03-17 RX ADMIN — Medication 1: at 16:49

## 2021-03-17 RX ADMIN — PANTOPRAZOLE SODIUM 40 MILLIGRAM(S): 20 TABLET, DELAYED RELEASE ORAL at 07:03

## 2021-03-17 RX ADMIN — ENOXAPARIN SODIUM 100 MILLIGRAM(S): 100 INJECTION SUBCUTANEOUS at 17:25

## 2021-03-17 NOTE — PROGRESS NOTE ADULT - SUBJECTIVE AND OBJECTIVE BOX
PAULA ELMORE  56y, Female  Allergy: Allergy Status Unknown    Hospital Day: 7d    Patient seen and examined earlier today. No acute events overnight.    PMH/PSH:  PAST MEDICAL & SURGICAL HISTORY:    VITALS:  T(F): 98.6 (03-17-21 @ 08:00), Max: 101.5 (03-17-21 @ 04:33)  HR: 86 (03-17-21 @ 08:05)  BP: 117/54 (03-17-21 @ 08:00) (117/54 - 169/76)  RR: 20 (03-17-21 @ 08:05)  SpO2: 97% (03-17-21 @ 08:05)    TESTS & MEASUREMENTS:  Weight (Kg):       03-16-21 @ 07:01  -  03-17-21 @ 07:00  --------------------------------------------------------  IN: 0 mL / OUT: 1900 mL / NET: -1900 mL                        12.0   18.28 )-----------( 258      ( 16 Mar 2021 07:58 )             37.6     03-16    143  |  101  |  19  ----------------------------<  165<H>  4.5   |  25  |  0.7    Ca    9.2      16 Mar 2021 07:58  Mg     2.1     03-16    TPro  7.0  /  Alb  3.5  /  TBili  0.5  /  DBili  x   /  AST  55<H>  /  ALT  52<H>  /  AlkPhos  102  03-16    LIVER FUNCTIONS - ( 16 Mar 2021 07:58 )  Alb: 3.5 g/dL / Pro: 7.0 g/dL / ALK PHOS: 102 U/L / ALT: 52 U/L / AST: 55 U/L / GGT: x           Culture - Urine (collected 03-11-21 @ 04:30)  Source: .Urine Clean Catch (Midstream)  Final Report (03-13-21 @ 10:10):    >=3 organisms. Probable collection contamination.    Culture - Blood (collected 03-10-21 @ 23:50)  Source: .Blood Blood-Peripheral  Final Report (03-16-21 @ 08:06):    No Growth Final    Culture - Blood (collected 03-10-21 @ 23:35)  Source: .Blood Blood-Peripheral  Final Report (03-16-21 @ 08:06):    No Growth Final    RECENT DIAGNOSTIC ORDERS:  Ferritin, Serum: AM Sched. Collection: 17-Mar-2021 04:30 (03-16-21 @ 11:12)  C-Reactive Protein, Serum: AM Sched. Collection: 17-Mar-2021 04:30 (03-16-21 @ 11:12)  D-Dimer Assay, Quantitative:  Start Date:16-Mar-2021. AM Sched. Collection:17-Mar-2021 04:30 (03-16-21 @ 11:12)  Magnesium, Serum: AM Sched. Collection: 17-Mar-2021 04:30 (03-16-21 @ 11:12)  Comprehensive Metabolic Panel: AM Sched. Collection: 17-Mar-2021 04:30 (03-16-21 @ 11:12)  Complete Blood Count + Automated Diff: AM Sched. Collection: 17-Mar-2021 04:30 (03-16-21 @ 11:12)    MEDICATIONS:  MEDICATIONS  (STANDING):  chlorhexidine 4% Liquid 1 Application(s) Topical daily  dexAMETHasone  Injectable 6 milliGRAM(s) IV Push daily  enoxaparin Injectable 100 milliGRAM(s) SubCutaneous two times a day  insulin glargine Injectable (LANTUS) 20 Unit(s) SubCutaneous at bedtime  insulin lispro (ADMELOG) corrective regimen sliding scale   SubCutaneous three times a day before meals  insulin lispro Injectable (ADMELOG) 3 Unit(s) SubCutaneous three times a day before meals  levoFLOXacin IVPB      levoFLOXacin IVPB 750 milliGRAM(s) IV Intermittent every 24 hours  metoprolol tartrate 25 milliGRAM(s) Oral two times a day  pantoprazole    Tablet 40 milliGRAM(s) Oral before breakfast    MEDICATIONS  (PRN):  acetaminophen   Tablet .. 650 milliGRAM(s) Oral every 6 hours PRN Temp greater or equal to 38C (100.4F), Mild Pain (1 - 3)  guaifenesin/dextromethorphan  Syrup 10 milliLiter(s) Oral every 6 hours PRN Cough      HOME MEDICATIONS:      REVIEW OF SYSTEMS:  All other review of systems is negative unless indicated above.     PHYSICAL EXAM:  GENERAL: NAD  HEENT: No Swelling  CHEST/LUNG: Good air entry, No wheezing  HEART: RRR, No murmurs  ABDOMEN: Soft, Bowel sounds present  EXTREMITIES:  No clubbing

## 2021-03-17 NOTE — PROGRESS NOTE ADULT - ASSESSMENT
Patient is a 56-year-old, female patient, PMHx: Hypertension, presented to the ED for Shortness of breath and found to have COVID19 PNA    #Acute Hypoxic Respiratory Failure  #COVID 19 PNA  Currently on HFNC 45L/85% saturating 97%  s/p RDV therapy  Ddimers elevated to 33K on 03/16, LE duplex negative  - Cont Levaquin (Start 03/12)  - Cont decadron 6mg qD (Start 03/10)  - Lovenox therapeutic 100mg BID  - Trend inflammatory markers q48-72h  - Taper down oxygen requirements as tolerated    #HTN  #Sinus tachycardia  - Metoprolol [id number] BID  Hypertension with sinus tachy- started on lopressor 25mg po twice daily tx.    #DM2, new onset  Hba1c 6.9 (03/12)  - Lantus/lispro 20-3-3-3  - ISS  - Once downgraded, endocrine consult for outpt tx recommendations    #Progress Note Handoff  Pending (specify): Tapering off HFNC  Family discussion: d/w pt regarding tapering down O2 requirements  Disposition: Requiring HFNC, TBD

## 2021-03-17 NOTE — PROGRESS NOTE ADULT - SUBJECTIVE AND OBJECTIVE BOX
PAULA ELMORE 56y Female  MRN#: [id number]   Hospital Day: 7d    SUBJECTIVE  Patient is a 56y old Female who presents with a chief complaint of Shortness of breath (16 Mar 2021 10:43)  Currently admitted to medicine with the primary diagnosis of Acute respiratory failure with hypoxia      INTERVAL HPI AND OVERNIGHT EVENTS:  Patient was examined and seen at bedside. This morning she is resting comfortably in bed and reports no issues or overnight events.    REVIEW OF SYMPTOMS:  Denies any pain, urinary, or bowel complaints. Endorses adequate PO intake.    OBJECTIVE  PAST MEDICAL & SURGICAL HISTORY    ALLERGIES:  Allergy Status Unknown    MEDICATIONS:  STANDING MEDICATIONS  chlorhexidine 4% Liquid 1 Application(s) Topical daily  dexAMETHasone  Injectable 6 milliGRAM(s) IV Push daily  enoxaparin Injectable 100 milliGRAM(s) SubCutaneous two times a day  insulin glargine Injectable (LANTUS) 20 Unit(s) SubCutaneous at bedtime  insulin lispro (ADMELOG) corrective regimen sliding scale   SubCutaneous three times a day before meals  insulin lispro Injectable (ADMELOG) 3 Unit(s) SubCutaneous three times a day before meals  levoFLOXacin IVPB      levoFLOXacin IVPB 750 milliGRAM(s) IV Intermittent every 24 hours  metoprolol tartrate 25 milliGRAM(s) Oral two times a day  pantoprazole    Tablet 40 milliGRAM(s) Oral before breakfast    PRN MEDICATIONS  acetaminophen   Tablet .. 650 milliGRAM(s) Oral every 6 hours PRN  guaifenesin/dextromethorphan  Syrup 10 milliLiter(s) Oral every 6 hours PRN      VITAL SIGNS: Last 24 Hours  T(C): 38.6 (17 Mar 2021 04:33), Max: 38.6 (17 Mar 2021 04:33)  T(F): 101.5 (17 Mar 2021 04:33), Max: 101.5 (17 Mar 2021 04:33)  HR: 86 (17 Mar 2021 08:05) (86 - 111)  BP: 130/59 (17 Mar 2021 04:33) (123/58 - 169/76)  BP(mean): --  RR: 20 (17 Mar 2021 08:05) (20 - 24)  SpO2: 97% (17 Mar 2021 08:05) (95% - 98%)    LABS:                        12.0   18.28 )-----------( 258      ( 16 Mar 2021 07:58 )             37.6     03-16    143  |  101  |  19  ----------------------------<  165<H>  4.5   |  25  |  0.7    Ca    9.2      16 Mar 2021 07:58  Mg     2.1     03-16    TPro  7.0  /  Alb  3.5  /  TBili  0.5  /  DBili  x   /  AST  55<H>  /  ALT  52<H>  /  AlkPhos  102  03-16                  RADIOLOGY:      PHYSICAL EXAM:  CONSTITUTIONAL: No acute distress, well-developed, AAOx3  HEAD: Atraumatic, normocephalic  EYES: EOM intact  ENT: Supple, no JVD  PULMONARY: Clear to auscultation bilaterally; no wheezes, rales. Breathing non-labored  CARDIOVASCULAR: Regular rate and rhythm; no murmurs  GASTROINTESTINAL: Soft, non-tender, non-distended  MUSCULOSKELETAL: Moves 4/4 extremities no cyanosis, no edema, no erythema, induration, or tenderness of posterior calves  NEUROLOGY: non-focal  SKIN: Intact    ASSESSMENT & PLAN  56-year-old, female patient, PMHx: Hypertension, presented to the ED for Shortness of breath. Histoy goes back to 2 days prior to presentation when the patient started having dyspnea. New-onset, aggravated by exertion, no alleviating factors. No orthopnea. The patient had an associated cough, productive of whitish sputum. She denies any fever ( but was febrile in the ED). No sore throat, no anosmia no loss of taste. No skin contact, no recent travel.     #Acute Hypoxic Respiratory failure secondary to SARS-CoV-2 Pneumonia:   - Currently on HFNC 45/100--wean as tolerated   - s/p RDV x5 days  - s/p Convalescent Plasma  - c/w rocephin and levaquin (QTc 449 today)  - COVID-19 antibodies negative   - c/w Dexamethasone 6mg IV qd    #Elevated D-dimer--uptrending  - LE duplex on 3/12, negative for DVT  -   - Therapeutic lovenox started    #Hypertension:   - Pt does not know home regimen  - c/w norvasc 5mg qd  - c/w lopressor 25mg BID    #Elevated Troponin --resolved  - Troponin: 0.02 at admission then .03, .04, now .02  - No Active Chest pain  - EKG: Sinus Tachycardia    #Transaminitis:   - AST:77 ALT:31, down trended since admission    #Misc:   - DVT prophylaxis: Lovenox 100mg BID  - GI prophy: Pantoprazole 40mg QD  - Diet: DASH/TLC  - Dispo: Acute    PAST MEDICAL & SURGICAL HISTORY:   PAULA ELMORE 56y Female  MRN#: [id number]   Hospital Day: 7d    SUBJECTIVE  Patient is a 56y old Female who presents with a chief complaint of Shortness of breath (16 Mar 2021 10:43)  Currently admitted to medicine with the primary diagnosis of Acute respiratory failure with hypoxia      INTERVAL HPI AND OVERNIGHT EVENTS:  Patient was examined and seen at bedside. This morning she is resting comfortably in bed and reports no issues or overnight events.    REVIEW OF SYMPTOMS:  Denies any pain, urinary, or bowel complaints. Endorses adequate PO intake.    OBJECTIVE  PAST MEDICAL & SURGICAL HISTORY    ALLERGIES:  Allergy Status Unknown    MEDICATIONS:  STANDING MEDICATIONS  chlorhexidine 4% Liquid 1 Application(s) Topical daily  dexAMETHasone  Injectable 6 milliGRAM(s) IV Push daily  enoxaparin Injectable 100 milliGRAM(s) SubCutaneous two times a day  insulin glargine Injectable (LANTUS) 20 Unit(s) SubCutaneous at bedtime  insulin lispro (ADMELOG) corrective regimen sliding scale   SubCutaneous three times a day before meals  insulin lispro Injectable (ADMELOG) 3 Unit(s) SubCutaneous three times a day before meals  levoFLOXacin IVPB      levoFLOXacin IVPB 750 milliGRAM(s) IV Intermittent every 24 hours  metoprolol tartrate 25 milliGRAM(s) Oral two times a day  pantoprazole    Tablet 40 milliGRAM(s) Oral before breakfast    PRN MEDICATIONS  acetaminophen   Tablet .. 650 milliGRAM(s) Oral every 6 hours PRN  guaifenesin/dextromethorphan  Syrup 10 milliLiter(s) Oral every 6 hours PRN      VITAL SIGNS: Last 24 Hours  T(C): 38.6 (17 Mar 2021 04:33), Max: 38.6 (17 Mar 2021 04:33)  T(F): 101.5 (17 Mar 2021 04:33), Max: 101.5 (17 Mar 2021 04:33)  HR: 86 (17 Mar 2021 08:05) (86 - 111)  BP: 130/59 (17 Mar 2021 04:33) (123/58 - 169/76)  BP(mean): --  RR: 20 (17 Mar 2021 08:05) (20 - 24)  SpO2: 97% (17 Mar 2021 08:05) (95% - 98%)    LABS:                        12.0   18.28 )-----------( 258      ( 16 Mar 2021 07:58 )             37.6     03-16    143  |  101  |  19  ----------------------------<  165<H>  4.5   |  25  |  0.7    Ca    9.2      16 Mar 2021 07:58  Mg     2.1     03-16    TPro  7.0  /  Alb  3.5  /  TBili  0.5  /  DBili  x   /  AST  55<H>  /  ALT  52<H>  /  AlkPhos  102  03-16                  RADIOLOGY:  < from: Xray Chest 1 View- PORTABLE-Routine (Xray Chest 1 View- PORTABLE-Routine in AM.) (03.16.21 @ 06:42) >  Comparison : Chest radiograph 3/10/2021.    Technique/Positioning: Single AP chest radiograph.    Findings:    Support devices: Telemetry leads overlie the thorax.    Cardiac/mediastinum/hilum: Stable cardiomegaly.    Lung parenchyma/Pleura: Bilateral parenchymal opacities. No effusion or pneumothorax.    Skeleton/soft tissues: Unchanged.    Impression:    Cardiomegaly with bilateral opacifications consistent with viral pneumonia.    < end of copied text >      PHYSICAL EXAM:  CONSTITUTIONAL: No acute distress, well-developed, AAOx3  HEAD: Atraumatic, normocephalic  EYES: EOM intact  ENT: Supple, no JVD  PULMONARY: Clear to auscultation bilaterally; no wheezes, rales. Breathing non-labored  CARDIOVASCULAR: Regular rate and rhythm; no murmurs  GASTROINTESTINAL: Soft, non-tender, non-distended  MUSCULOSKELETAL: Moves 4/4 extremities no cyanosis, no edema, no erythema, induration, or tenderness of posterior calves  NEUROLOGY: non-focal  SKIN: Intact    ASSESSMENT & PLAN  56-year-old, female patient, PMHx: Hypertension, presented to the ED for Shortness of breath. Histoy goes back to 2 days prior to presentation when the patient started having dyspnea. New-onset, aggravated by exertion, no alleviating factors. No orthopnea. The patient had an associated cough, productive of whitish sputum. She denies any fever ( but was febrile in the ED). No sore throat, no anosmia no loss of taste. No skin contact, no recent travel.     #Acute Hypoxic Respiratory failure secondary to SARS-CoV-2 Pneumonia:   - Currently on HFNC 45/100--wean as tolerated   - s/p RDV x5 days  - s/p Convalescent Plasma  - Rocephin 5-day course completed  - c/w levaquin (QTc 449 on 3/16)  - COVID-19 antibodies negative   - c/w Dexamethasone 6mg IV qd    #Elevated D-dimer--uptrending  - LE duplex on 3/12, negative for DVT  - c/w therapeutic lovenox    #Hypertension:   - Pt does not know home regimen  - c/w norvasc 5mg qd  - c/w lopressor 25mg BID    #Elevated Troponin --resolved  - Troponin: 0.02 at admission then .03, .04, now .02  - No Active Chest pain  - EKG: Sinus Tachycardia    #Transaminitis:   - AST:77 ALT:31, down trended since admission    #Misc:   - DVT prophylaxis: Lovenox 100mg BID  - GI prophy: Pantoprazole 40mg QD  - Diet: DASH/TLC  - Dispo: Acute    PAST MEDICAL & SURGICAL HISTORY:   PAULA ELMORE 56y Female  MRN#: [id number]   Hospital Day: 7d    SUBJECTIVE  Patient is a 56y old Female who presents with a chief complaint of Shortness of breath (16 Mar 2021 10:43)  Currently admitted to medicine with the primary diagnosis of Acute respiratory failure with hypoxia      INTERVAL HPI AND OVERNIGHT EVENTS:  Patient was examined and seen at bedside. This morning she is resting comfortably in bed and reports no issues or overnight events.    REVIEW OF SYMPTOMS:  Denies any pain, urinary, or bowel complaints. Endorses adequate PO intake.    OBJECTIVE  PAST MEDICAL & SURGICAL HISTORY    ALLERGIES:  Allergy Status Unknown    MEDICATIONS:  STANDING MEDICATIONS  chlorhexidine 4% Liquid 1 Application(s) Topical daily  dexAMETHasone  Injectable 6 milliGRAM(s) IV Push daily  enoxaparin Injectable 100 milliGRAM(s) SubCutaneous two times a day  insulin glargine Injectable (LANTUS) 20 Unit(s) SubCutaneous at bedtime  insulin lispro (ADMELOG) corrective regimen sliding scale   SubCutaneous three times a day before meals  insulin lispro Injectable (ADMELOG) 3 Unit(s) SubCutaneous three times a day before meals  levoFLOXacin IVPB      levoFLOXacin IVPB 750 milliGRAM(s) IV Intermittent every 24 hours  metoprolol tartrate 25 milliGRAM(s) Oral two times a day  pantoprazole    Tablet 40 milliGRAM(s) Oral before breakfast    PRN MEDICATIONS  acetaminophen   Tablet .. 650 milliGRAM(s) Oral every 6 hours PRN  guaifenesin/dextromethorphan  Syrup 10 milliLiter(s) Oral every 6 hours PRN      VITAL SIGNS: Last 24 Hours  T(C): 38.6 (17 Mar 2021 04:33), Max: 38.6 (17 Mar 2021 04:33)  T(F): 101.5 (17 Mar 2021 04:33), Max: 101.5 (17 Mar 2021 04:33)  HR: 86 (17 Mar 2021 08:05) (86 - 111)  BP: 130/59 (17 Mar 2021 04:33) (123/58 - 169/76)  BP(mean): --  RR: 20 (17 Mar 2021 08:05) (20 - 24)  SpO2: 97% (17 Mar 2021 08:05) (95% - 98%)    LABS:                        12.0   18.28 )-----------( 258      ( 16 Mar 2021 07:58 )             37.6     03-16    143  |  101  |  19  ----------------------------<  165<H>  4.5   |  25  |  0.7    Ca    9.2      16 Mar 2021 07:58  Mg     2.1     03-16    TPro  7.0  /  Alb  3.5  /  TBili  0.5  /  DBili  x   /  AST  55<H>  /  ALT  52<H>  /  AlkPhos  102  03-16                  RADIOLOGY:  < from: Xray Chest 1 View- PORTABLE-Routine (Xray Chest 1 View- PORTABLE-Routine in AM.) (03.16.21 @ 06:42) >  Comparison : Chest radiograph 3/10/2021.    Technique/Positioning: Single AP chest radiograph.    Findings:    Support devices: Telemetry leads overlie the thorax.    Cardiac/mediastinum/hilum: Stable cardiomegaly.    Lung parenchyma/Pleura: Bilateral parenchymal opacities. No effusion or pneumothorax.    Skeleton/soft tissues: Unchanged.    Impression:    Cardiomegaly with bilateral opacifications consistent with viral pneumonia.    < end of copied text >      PHYSICAL EXAM:  CONSTITUTIONAL: No acute distress, well-developed, AAOx3  HEAD: Atraumatic, normocephalic  EYES: EOM intact  ENT: Supple, no JVD  PULMONARY: Clear to auscultation bilaterally; no wheezes, rales. Breathing non-labored  CARDIOVASCULAR: Regular rate and rhythm; no murmurs  GASTROINTESTINAL: Soft, non-tender, non-distended  MUSCULOSKELETAL: Moves 4/4 extremities no cyanosis, no edema, no erythema, induration, or tenderness of posterior calves  NEUROLOGY: non-focal  SKIN: Intact    ASSESSMENT & PLAN  56-year-old, female patient, PMHx: Hypertension, presented to the ED for Shortness of breath. Histoy goes back to 2 days prior to presentation when the patient started having dyspnea. New-onset, aggravated by exertion, no alleviating factors. No orthopnea. The patient had an associated cough, productive of whitish sputum. She denies any fever ( but was febrile in the ED). No sore throat, no anosmia no loss of taste. No skin contact, no recent travel.     #Acute Hypoxic Respiratory failure secondary to SARS-CoV-2 Pneumonia:   - Currently on HFNC 45/100--wean as tolerated   - s/p RDV x5 days  - s/p Convalescent Plasma  - Rocephin 5-day course completed  - c/w levaquin (QTc 449 on 3/16)  - COVID-19 antibodies negative   - c/w Dexamethasone 6mg IV qd  Ferritin  Procal  D-dimer  CRP--    #Elevated D-dimer--uptrending  - LE duplex on 3/12, negative for DVT  - c/w therapeutic lovenox    #Hypertension:   - Pt does not know home regimen  - c/w norvasc 5mg qd  - c/w lopressor 25mg BID    #Elevated Troponin --resolved  - Troponin: 0.02 at admission then .03, .04, now .02  - No Active Chest pain  - EKG: Sinus Tachycardia    #Transaminitis:   - AST:77 ALT:31, down trended since admission    #Misc:   - DVT prophylaxis: Lovenox 100mg BID  - GI prophy: Pantoprazole 40mg QD  - Diet: DASH/TLC  - Dispo: Acute    PAST MEDICAL & SURGICAL HISTORY:   PAULA ELMORE 56y Female  MRN#: [id number]   Hospital Day: 7d    SUBJECTIVE  Patient is a 56y old Female who presents with a chief complaint of Shortness of breath (16 Mar 2021 10:43)  Currently admitted to medicine with the primary diagnosis of Acute respiratory failure with hypoxia      INTERVAL HPI AND OVERNIGHT EVENTS:  Patient was examined and seen at bedside. This morning she is resting comfortably in bed and reports no issues or overnight events.    REVIEW OF SYMPTOMS:  Denies any pain, urinary, or bowel complaints. Endorses adequate PO intake.    OBJECTIVE  PAST MEDICAL & SURGICAL HISTORY    ALLERGIES:  Allergy Status Unknown    MEDICATIONS:  STANDING MEDICATIONS  chlorhexidine 4% Liquid 1 Application(s) Topical daily  dexAMETHasone  Injectable 6 milliGRAM(s) IV Push daily  enoxaparin Injectable 100 milliGRAM(s) SubCutaneous two times a day  insulin glargine Injectable (LANTUS) 20 Unit(s) SubCutaneous at bedtime  insulin lispro (ADMELOG) corrective regimen sliding scale   SubCutaneous three times a day before meals  insulin lispro Injectable (ADMELOG) 3 Unit(s) SubCutaneous three times a day before meals  levoFLOXacin IVPB      levoFLOXacin IVPB 750 milliGRAM(s) IV Intermittent every 24 hours  metoprolol tartrate 25 milliGRAM(s) Oral two times a day  pantoprazole    Tablet 40 milliGRAM(s) Oral before breakfast    PRN MEDICATIONS  acetaminophen   Tablet .. 650 milliGRAM(s) Oral every 6 hours PRN  guaifenesin/dextromethorphan  Syrup 10 milliLiter(s) Oral every 6 hours PRN      VITAL SIGNS: Last 24 Hours  T(C): 38.6 (17 Mar 2021 04:33), Max: 38.6 (17 Mar 2021 04:33)  T(F): 101.5 (17 Mar 2021 04:33), Max: 101.5 (17 Mar 2021 04:33)  HR: 86 (17 Mar 2021 08:05) (86 - 111)  BP: 130/59 (17 Mar 2021 04:33) (123/58 - 169/76)  BP(mean): --  RR: 20 (17 Mar 2021 08:05) (20 - 24)  SpO2: 97% (17 Mar 2021 08:05) (95% - 98%)    LABS:                        12.0   18.28 )-----------( 258      ( 16 Mar 2021 07:58 )             37.6     03-16    143  |  101  |  19  ----------------------------<  165<H>  4.5   |  25  |  0.7    Ca    9.2      16 Mar 2021 07:58  Mg     2.1     03-16    TPro  7.0  /  Alb  3.5  /  TBili  0.5  /  DBili  x   /  AST  55<H>  /  ALT  52<H>  /  AlkPhos  102  03-16                  RADIOLOGY:  < from: Xray Chest 1 View- PORTABLE-Routine (Xray Chest 1 View- PORTABLE-Routine in AM.) (03.16.21 @ 06:42) >  Comparison : Chest radiograph 3/10/2021.    Technique/Positioning: Single AP chest radiograph.    Findings:    Support devices: Telemetry leads overlie the thorax.    Cardiac/mediastinum/hilum: Stable cardiomegaly.    Lung parenchyma/Pleura: Bilateral parenchymal opacities. No effusion or pneumothorax.    Skeleton/soft tissues: Unchanged.    Impression:    Cardiomegaly with bilateral opacifications consistent with viral pneumonia.    < end of copied text >      PHYSICAL EXAM:  CONSTITUTIONAL: No acute distress, well-developed, AAOx3  HEAD: Atraumatic, normocephalic  EYES: EOM intact  ENT: Supple, no JVD  PULMONARY: Clear to auscultation bilaterally; no wheezes, rales. Breathing non-labored  CARDIOVASCULAR: Regular rate and rhythm; no murmurs  GASTROINTESTINAL: Soft, non-tender, non-distended  MUSCULOSKELETAL: Moves 4/4 extremities no cyanosis, no edema, no erythema, induration, or tenderness of posterior calves  NEUROLOGY: non-focal  SKIN: Intact    ASSESSMENT & PLAN  56-year-old, female patient, PMHx: Hypertension, presented to the ED for Shortness of breath. Histoy goes back to 2 days prior to presentation when the patient started having dyspnea. New-onset, aggravated by exertion, no alleviating factors. No orthopnea. The patient had an associated cough, productive of whitish sputum. She denies any fever ( but was febrile in the ED). No sore throat, no anosmia no loss of taste. No skin contact, no recent travel.     #Acute Hypoxic Respiratory failure secondary to SARS-CoV-2 Pneumonia:   - Currently on HFNC 45/100--wean as tolerated   - s/p RDV x5 days  - s/p Convalescent Plasma  - Rocephin 5-day course completed  - c/w levaquin (QTc 449 on 3/16)  - COVID-19 antibodies negative   - c/w Dexamethasone 6mg IV qd  Ferritin--1148>[id number]>1365>1094  D-dimer--248>254>[id number]  Procal--31.9>21.6>14.8>4.08>0.52  CRP--214>220>148>84>139    #Elevated D-dimer--uptrending  - LE duplex on 3/12, negative for DVT  - c/w therapeutic lovenox    #Hypertension:   - Pt does not know home regimen  - c/w norvasc 5mg qd  - c/w lopressor 25mg BID    #Elevated Troponin --resolved  - Troponin: 0.02 at admission then .03, .04, now .02  - No Active Chest pain  - EKG: Sinus Tachycardia    #Transaminitis:   - AST:77 ALT:31, down trended since admission    #Misc:   - DVT prophylaxis: Lovenox 100mg BID  - GI prophy: Pantoprazole 40mg QD  - Diet: DASH/TLC  - Dispo: Acute    PAST MEDICAL & SURGICAL HISTORY:

## 2021-03-17 NOTE — DIETITIAN INITIAL EVALUATION ADULT. - OTHER CALCULATIONS
Energy: 0155-6043 kcal/day (25-30 kcal/kg IBW). Protein: 51-64 g/day (0.8-1 g/kg IBW). Fluids: 1 mL/kcal or per LIP

## 2021-03-18 LAB
ALBUMIN SERPL ELPH-MCNC: 3.1 G/DL — LOW (ref 3.5–5.2)
ALP SERPL-CCNC: 97 U/L — SIGNIFICANT CHANGE UP (ref 30–115)
ALT FLD-CCNC: 44 U/L — HIGH (ref 0–41)
ANION GAP SERPL CALC-SCNC: 14 MMOL/L — SIGNIFICANT CHANGE UP (ref 7–14)
AST SERPL-CCNC: 48 U/L — HIGH (ref 0–41)
BASOPHILS # BLD AUTO: 0.06 K/UL — SIGNIFICANT CHANGE UP (ref 0–0.2)
BASOPHILS NFR BLD AUTO: 0.3 % — SIGNIFICANT CHANGE UP (ref 0–1)
BILIRUB SERPL-MCNC: 0.4 MG/DL — SIGNIFICANT CHANGE UP (ref 0.2–1.2)
BUN SERPL-MCNC: 22 MG/DL — HIGH (ref 10–20)
CALCIUM SERPL-MCNC: 8.6 MG/DL — SIGNIFICANT CHANGE UP (ref 8.5–10.1)
CHLORIDE SERPL-SCNC: 99 MMOL/L — SIGNIFICANT CHANGE UP (ref 98–110)
CO2 SERPL-SCNC: 24 MMOL/L — SIGNIFICANT CHANGE UP (ref 17–32)
CREAT SERPL-MCNC: 0.7 MG/DL — SIGNIFICANT CHANGE UP (ref 0.7–1.5)
D DIMER BLD IA.RAPID-MCNC: 5701 NG/ML DDU — HIGH (ref 0–230)
EOSINOPHIL # BLD AUTO: 0.03 K/UL — SIGNIFICANT CHANGE UP (ref 0–0.7)
EOSINOPHIL NFR BLD AUTO: 0.2 % — SIGNIFICANT CHANGE UP (ref 0–8)
GLUCOSE BLDC GLUCOMTR-MCNC: 132 MG/DL — HIGH (ref 70–99)
GLUCOSE BLDC GLUCOMTR-MCNC: 172 MG/DL — HIGH (ref 70–99)
GLUCOSE BLDC GLUCOMTR-MCNC: 206 MG/DL — HIGH (ref 70–99)
GLUCOSE BLDC GLUCOMTR-MCNC: 274 MG/DL — HIGH (ref 70–99)
GLUCOSE SERPL-MCNC: 167 MG/DL — HIGH (ref 70–99)
HCT VFR BLD CALC: 35.2 % — LOW (ref 37–47)
HGB BLD-MCNC: 11.2 G/DL — LOW (ref 12–16)
IMM GRANULOCYTES NFR BLD AUTO: 3.7 % — HIGH (ref 0.1–0.3)
LYMPHOCYTES # BLD AUTO: 0.82 K/UL — LOW (ref 1.2–3.4)
LYMPHOCYTES # BLD AUTO: 4.2 % — LOW (ref 20.5–51.1)
MAGNESIUM SERPL-MCNC: 2.3 MG/DL — SIGNIFICANT CHANGE UP (ref 1.8–2.4)
MCHC RBC-ENTMCNC: 28.9 PG — SIGNIFICANT CHANGE UP (ref 27–31)
MCHC RBC-ENTMCNC: 31.8 G/DL — LOW (ref 32–37)
MCV RBC AUTO: 91 FL — SIGNIFICANT CHANGE UP (ref 81–99)
MONOCYTES # BLD AUTO: 0.79 K/UL — HIGH (ref 0.1–0.6)
MONOCYTES NFR BLD AUTO: 4 % — SIGNIFICANT CHANGE UP (ref 1.7–9.3)
NEUTROPHILS # BLD AUTO: 17.14 K/UL — HIGH (ref 1.4–6.5)
NEUTROPHILS NFR BLD AUTO: 87.6 % — HIGH (ref 42.2–75.2)
NRBC # BLD: 0 /100 WBCS — SIGNIFICANT CHANGE UP (ref 0–0)
PLATELET # BLD AUTO: 309 K/UL — SIGNIFICANT CHANGE UP (ref 130–400)
POTASSIUM SERPL-MCNC: 4.3 MMOL/L — SIGNIFICANT CHANGE UP (ref 3.5–5)
POTASSIUM SERPL-SCNC: 4.3 MMOL/L — SIGNIFICANT CHANGE UP (ref 3.5–5)
PROT SERPL-MCNC: 6.4 G/DL — SIGNIFICANT CHANGE UP (ref 6–8)
RBC # BLD: 3.87 M/UL — LOW (ref 4.2–5.4)
RBC # FLD: 13.2 % — SIGNIFICANT CHANGE UP (ref 11.5–14.5)
SODIUM SERPL-SCNC: 137 MMOL/L — SIGNIFICANT CHANGE UP (ref 135–146)
WBC # BLD: 19.57 K/UL — HIGH (ref 4.8–10.8)
WBC # FLD AUTO: 19.57 K/UL — HIGH (ref 4.8–10.8)

## 2021-03-18 PROCEDURE — 99233 SBSQ HOSP IP/OBS HIGH 50: CPT

## 2021-03-18 RX ORDER — TOCILIZUMAB 20 MG/ML
400 INJECTION, SOLUTION, CONCENTRATE INTRAVENOUS ONCE
Refills: 0 | Status: COMPLETED | OUTPATIENT
Start: 2021-03-18 | End: 2021-03-18

## 2021-03-18 RX ADMIN — ENOXAPARIN SODIUM 100 MILLIGRAM(S): 100 INJECTION SUBCUTANEOUS at 17:36

## 2021-03-18 RX ADMIN — CHLORHEXIDINE GLUCONATE 1 APPLICATION(S): 213 SOLUTION TOPICAL at 11:42

## 2021-03-18 RX ADMIN — ENOXAPARIN SODIUM 100 MILLIGRAM(S): 100 INJECTION SUBCUTANEOUS at 05:09

## 2021-03-18 RX ADMIN — INSULIN GLARGINE 20 UNIT(S): 100 INJECTION, SOLUTION SUBCUTANEOUS at 21:15

## 2021-03-18 RX ADMIN — Medication 1: at 08:19

## 2021-03-18 RX ADMIN — Medication 3 UNIT(S): at 11:36

## 2021-03-18 RX ADMIN — Medication 3: at 11:36

## 2021-03-18 RX ADMIN — TOCILIZUMAB 100 MILLIGRAM(S): 20 INJECTION, SOLUTION, CONCENTRATE INTRAVENOUS at 17:37

## 2021-03-18 RX ADMIN — Medication 25 MILLIGRAM(S): at 17:36

## 2021-03-18 RX ADMIN — PANTOPRAZOLE SODIUM 40 MILLIGRAM(S): 20 TABLET, DELAYED RELEASE ORAL at 06:17

## 2021-03-18 RX ADMIN — Medication 3 UNIT(S): at 16:48

## 2021-03-18 RX ADMIN — Medication 3 UNIT(S): at 08:18

## 2021-03-18 RX ADMIN — Medication 6 MILLIGRAM(S): at 05:04

## 2021-03-18 RX ADMIN — Medication 25 MILLIGRAM(S): at 05:04

## 2021-03-18 RX ADMIN — Medication 650 MILLIGRAM(S): at 16:13

## 2021-03-18 RX ADMIN — Medication 2: at 16:48

## 2021-03-18 NOTE — PROGRESS NOTE ADULT - ASSESSMENT
Patient is a 56-year-old, female patient, PMHx: Hypertension, presented to the ED for Shortness of breath and found to have COVID19 PNA    #Acute Hypoxic Respiratory Failure  #COVID 19 PNA  Currently on HFNC 50L/90% saturating 92-93%  s/p RDV therapy  Ddimers elevated to 33K on 03/16, LE duplex negative  - Cont Levaquin (Start 03/12) x 7d  - Cont decadron 6mg qD (Start 03/10)  - Lovenox therapeutic 100mg BID  - Trend inflammatory markers q48-72h  - Taper down oxygen requirements as tolerated    #HTN  #Sinus tachycardia  - Metoprolol [id number] BID  Hypertension with sinus tachy- started on lopressor 25mg po twice daily tx.    #DM2, new onset  Hba1c 6.9 (03/12)  - Lantus/lispro 20-3-3-3  - ISS  - Once downgraded, endocrine consult for outpt tx recommendations    #Progress Note Handoff  Pending (specify): Tapering off HFNC  Family discussion: d/w pt regarding tapering down O2 requirements  Disposition: Requiring HFNC, TBD

## 2021-03-18 NOTE — PROGRESS NOTE ADULT - ASSESSMENT
· Assessment	  56-year-old, female patient, PMHx: Hypertension, presented to the ED for Shortness of breath. Histoy goes back to 2 days prior to presentation when the patient started having dyspnea. New-onset, aggravated by exertion, no alleviating factors. No orthopnea. The patient had an associated cough, productive of whitish sputum.     IMPRESSION;  COVID 19 with severe illness. SpO2 < 94% on RA and need for supplemental O2.  Pt was  in the early viral replicative phase based on the timeline/onset of symptoms.  Inflammatory markers are elevated and suggestive of a cytokine response/cytokine storm.   ddimers 248>5701 ( suggestive of a thrombus )  Procal 21.60>0.52  ferritin 1148>1094  >139  No bacterial PNA    s/p plasma 3/11  s/p RDV    RECOMMENDATIONS;  Target SpO2 92 % to 96 %  Levo 500 mg iv q24h for 7 days  Dexamethasone 6 mg iv q24h for 10 days.  Monitor for side effects: hyperglycemia, neurological ( agitation/confusion), adrenal suppression, bacterial and fungal infections  Tocilizumab 400 mg iv x once. Ferritin 48h later   I shall be away from 3/19-3/29 and will be covered by Dr Du [id number] and Dr Au [id number] for that interval.

## 2021-03-18 NOTE — PROGRESS NOTE ADULT - SUBJECTIVE AND OBJECTIVE BOX
PAULA ELMORE  56y, Female  Allergy: Allergy Status Unknown    Hospital Day: 8d    Patient seen and examined earlier today. No acute events overnight.    PMH/PSH:  PAST MEDICAL & SURGICAL HISTORY:    VITALS:  T(F): 98.8 (03-18-21 @ 07:54), Max: 98.8 (03-18-21 @ 07:54)  HR: 101 (03-18-21 @ 07:54)  BP: 137/63 (03-18-21 @ 07:54) (113/59 - 145/67)  RR: 18 (03-18-21 @ 10:01)  SpO2: 92% (03-18-21 @ 10:01)    TESTS & MEASUREMENTS:  Weight (Kg):     03-16-21 @ 07:01  -  03-17-21 @ 07:00  --------------------------------------------------------  IN: 0 mL / OUT: 1900 mL / NET: -1900 mL    03-17-21 @ 07:01  -  03-18-21 @ 07:00  --------------------------------------------------------  IN: 390 mL / OUT: 1050 mL / NET: -660 mL                          11.2   19.57 )-----------( 309      ( 18 Mar 2021 07:44 )             35.2     03-18    137  |  99  |  22<H>  ----------------------------<  167<H>  4.3   |  24  |  0.7    Ca    8.6      18 Mar 2021 07:44  Mg     2.3     03-18    TPro  6.4  /  Alb  3.1<L>  /  TBili  0.4  /  DBili  x   /  AST  48<H>  /  ALT  44<H>  /  AlkPhos  97  03-18    LIVER FUNCTIONS - ( 18 Mar 2021 07:44 )  Alb: 3.1 g/dL / Pro: 6.4 g/dL / ALK PHOS: 97 U/L / ALT: 44 U/L / AST: 48 U/L / GGT: x           RECENT DIAGNOSTIC ORDERS:  Diet, DASH/TLC:   Sodium & Cholesterol Restricted  Consistent Carbohydrate No Snacks  Supplement Feeding Modality:  Oral  Glucerna Shake Cans or Servings Per Day:  1       Frequency:  Daily (03-18-21 @ 07:29)  Diet, DASH/TLC:   Sodium & Cholesterol Restricted  Consistent Carbohydrate Evening Snack  Supplement Feeding Modality:  Oral  Glucerna Shake Cans or Servings Per Day:  1       Frequency:  Daily (03-18-21 @ 01:22)  C-Reactive Protein, Serum: AM Sched. Collection: 18-Mar-2021 04:30 (03-17-21 @ 13:35)  Ferritin, Serum: AM Sched. Collection: 18-Mar-2021 04:30 (03-17-21 @ 13:35)    MEDICATIONS:  MEDICATIONS  (STANDING):  acetaminophen   Tablet .. 650 milliGRAM(s) Oral once  chlorhexidine 4% Liquid 1 Application(s) Topical daily  dexAMETHasone  Injectable 6 milliGRAM(s) IV Push daily  enoxaparin Injectable 100 milliGRAM(s) SubCutaneous two times a day  insulin glargine Injectable (LANTUS) 20 Unit(s) SubCutaneous at bedtime  insulin lispro (ADMELOG) corrective regimen sliding scale   SubCutaneous three times a day before meals  insulin lispro Injectable (ADMELOG) 3 Unit(s) SubCutaneous three times a day before meals  levoFLOXacin IVPB      levoFLOXacin IVPB 750 milliGRAM(s) IV Intermittent every 24 hours  metoprolol tartrate 25 milliGRAM(s) Oral two times a day  pantoprazole    Tablet 40 milliGRAM(s) Oral before breakfast    MEDICATIONS  (PRN):  acetaminophen   Tablet .. 650 milliGRAM(s) Oral every 6 hours PRN Temp greater or equal to 38C (100.4F), Mild Pain (1 - 3)  guaifenesin/dextromethorphan  Syrup 10 milliLiter(s) Oral every 6 hours PRN Cough      HOME MEDICATIONS:      REVIEW OF SYSTEMS:  All other review of systems is negative unless indicated above.     PHYSICAL EXAM:  GENERAL: NAD  HEENT: No Swelling  CHEST/LUNG: Good air entry, No wheezing  HEART: RRR, No murmurs  ABDOMEN: Soft, Bowel sounds present  EXTREMITIES:  No clubbing

## 2021-03-18 NOTE — PROGRESS NOTE ADULT - SUBJECTIVE AND OBJECTIVE BOX
PAULA ELMORE 56y Female  MRN#: [id number]   Hospital Day: 8d    SUBJECTIVE  Patient is a 56y old Female who presents with a chief complaint of Shortness of breath (18 Mar 2021 10:07)  Currently admitted to medicine with the primary diagnosis of Acute respiratory failure with hypoxia      INTERVAL HPI AND OVERNIGHT EVENTS:  Patient was examined and seen at bedside. This morning she is resting comfortably in bed.    REVIEW OF SYMPTOMS:      OBJECTIVE  PAST MEDICAL & SURGICAL HISTORY    ALLERGIES:  Allergy Status Unknown    MEDICATIONS:  STANDING MEDICATIONS  acetaminophen   Tablet .. 650 milliGRAM(s) Oral once  chlorhexidine 4% Liquid 1 Application(s) Topical daily  dexAMETHasone  Injectable 6 milliGRAM(s) IV Push daily  enoxaparin Injectable 100 milliGRAM(s) SubCutaneous two times a day  insulin glargine Injectable (LANTUS) 20 Unit(s) SubCutaneous at bedtime  insulin lispro (ADMELOG) corrective regimen sliding scale   SubCutaneous three times a day before meals  insulin lispro Injectable (ADMELOG) 3 Unit(s) SubCutaneous three times a day before meals  levoFLOXacin IVPB      levoFLOXacin IVPB 750 milliGRAM(s) IV Intermittent every 24 hours  metoprolol tartrate 25 milliGRAM(s) Oral two times a day  pantoprazole    Tablet 40 milliGRAM(s) Oral before breakfast    PRN MEDICATIONS  acetaminophen   Tablet .. 650 milliGRAM(s) Oral every 6 hours PRN  guaifenesin/dextromethorphan  Syrup 10 milliLiter(s) Oral every 6 hours PRN      VITAL SIGNS: Last 24 Hours  T(C): 37.1 (18 Mar 2021 07:54), Max: 37.1 (18 Mar 2021 07:54)  T(F): 98.8 (18 Mar 2021 07:54), Max: 98.8 (18 Mar 2021 07:54)  HR: 101 (18 Mar 2021 07:54) (80 - 102)  BP: 137/63 (18 Mar 2021 07:54) (113/59 - 145/67)  BP(mean): --  RR: 18 (18 Mar 2021 10:01) (18 - 20)  SpO2: 92% (18 Mar 2021 10:01) (87% - 95%)    LABS:                        11.2   19.57 )-----------( 309      ( 18 Mar 2021 07:44 )             35.2     03-18    137  |  99  |  22<H>  ----------------------------<  167<H>  4.3   |  24  |  0.7    Ca    8.6      18 Mar 2021 07:44  Mg     2.3     03-18    TPro  6.4  /  Alb  3.1<L>  /  TBili  0.4  /  DBili  x   /  AST  48<H>  /  ALT  44<H>  /  AlkPhos  97  03-18                  RADIOLOGY:      PHYSICAL EXAM:  CONSTITUTIONAL: No acute distress, well-developed, AAOx3  HEAD: Atraumatic, normocephalic  EYES: EOM intact  ENT: Supple, no JVD  PULMONARY: Clear to auscultation bilaterally; no wheezes, rales. Breathing non-labored  CARDIOVASCULAR: Regular rate and rhythm; no murmurs  GASTROINTESTINAL: Soft, non-tender, non-distended  MUSCULOSKELETAL: Moves 4/4 extremities no cyanosis, no edema, no erythema, induration, or tenderness of posterior calves  NEUROLOGY: non-focal  Skin: intact    ASSESSMENT & PLAN  56-year-old, female patient, PMHx: Hypertension, presented to the ED for Shortness of breath. Histoy goes back to 2 days prior to presentation when the patient started having dyspnea. New-onset, aggravated by exertion, no alleviating factors. No orthopnea. The patient had an associated cough, productive of whitish sputum. She denies any fever ( but was febrile in the ED). No sore throat, no anosmia no loss of taste. No skin contact, no recent travel.     #Acute Hypoxic Respiratory failure secondary to SARS-CoV-2 Pneumonia:   - Currently on HFNC 45/100--wean as tolerated   - s/p RDV x5 days  - s/p Convalescent Plasma  - Rocephin 5-day course completed  - c/w levaquin (QTc 449 on 3/16)  - COVID-19 antibodies negative   - c/w Dexamethasone 6mg IV qd  Ferritin--1148>1173>1365>1094  D-dimer--248>254>5287>04192>05370>5701  Procal--31.9>21.6>14.8>4.08>0.52  CRP--214>220>148>84>139    #Elevated D-dimer--downtrending, appears to have peaked  - Trend shown above  - LE duplex on 3/12, negative for DVT  - c/w therapeutic lovenox    #Hypertension:   - Pt does not know home regimen  - c/w norvasc 5mg qd  - c/w lopressor 25mg BID    #Elevated Troponin --resolved  - Troponin: 0.02 at admission then .03, .04, now .02  - No Active Chest pain  - EKG: Sinus Tachycardia    #Transaminitis:   - AST:77 ALT:31, down trended since admission    #Misc:   - DVT prophylaxis: Lovenox 100mg BID  - GI prophy: Pantoprazole 40mg QD  - Diet: DASH/TLC  - Dispo: Acute    PAST MEDICAL & SURGICAL HISTORY:   PAULA ELMORE 56y Female  MRN#: [id number]   Hospital Day: 8d    SUBJECTIVE  Patient is a 56y old Female who presents with a chief complaint of Shortness of breath (18 Mar 2021 10:07)  Currently admitted to medicine with the primary diagnosis of Acute respiratory failure with hypoxia      INTERVAL HPI AND OVERNIGHT EVENTS:  Patient was examined and seen at bedside. This morning she is resting comfortably in bed.    REVIEW OF SYMPTOMS:  Denies any pain, urinary, or bowel complaints. Endorses adequate PO intake.    OBJECTIVE  PAST MEDICAL & SURGICAL HISTORY    ALLERGIES:  Allergy Status Unknown    MEDICATIONS:  STANDING MEDICATIONS  acetaminophen   Tablet .. 650 milliGRAM(s) Oral once  chlorhexidine 4% Liquid 1 Application(s) Topical daily  dexAMETHasone  Injectable 6 milliGRAM(s) IV Push daily  enoxaparin Injectable 100 milliGRAM(s) SubCutaneous two times a day  insulin glargine Injectable (LANTUS) 20 Unit(s) SubCutaneous at bedtime  insulin lispro (ADMELOG) corrective regimen sliding scale   SubCutaneous three times a day before meals  insulin lispro Injectable (ADMELOG) 3 Unit(s) SubCutaneous three times a day before meals  levoFLOXacin IVPB      levoFLOXacin IVPB 750 milliGRAM(s) IV Intermittent every 24 hours  metoprolol tartrate 25 milliGRAM(s) Oral two times a day  pantoprazole    Tablet 40 milliGRAM(s) Oral before breakfast    PRN MEDICATIONS  acetaminophen   Tablet .. 650 milliGRAM(s) Oral every 6 hours PRN  guaifenesin/dextromethorphan  Syrup 10 milliLiter(s) Oral every 6 hours PRN      VITAL SIGNS: Last 24 Hours  T(C): 37.1 (18 Mar 2021 07:54), Max: 37.1 (18 Mar 2021 07:54)  T(F): 98.8 (18 Mar 2021 07:54), Max: 98.8 (18 Mar 2021 07:54)  HR: 101 (18 Mar 2021 07:54) (80 - 102)  BP: 137/63 (18 Mar 2021 07:54) (113/59 - 145/67)  BP(mean): --  RR: 18 (18 Mar 2021 10:01) (18 - 20)  SpO2: 92% (18 Mar 2021 10:01) (87% - 95%)    LABS:                        11.2   19.57 )-----------( 309      ( 18 Mar 2021 07:44 )             35.2     03-18    137  |  99  |  22<H>  ----------------------------<  167<H>  4.3   |  24  |  0.7    Ca    8.6      18 Mar 2021 07:44  Mg     2.3     03-18    TPro  6.4  /  Alb  3.1<L>  /  TBili  0.4  /  DBili  x   /  AST  48<H>  /  ALT  44<H>  /  AlkPhos  97  03-18                  RADIOLOGY:      PHYSICAL EXAM:  CONSTITUTIONAL: No acute distress, well-developed, AAOx3  HEAD: Atraumatic, normocephalic  EYES: EOM intact  ENT: Supple, no JVD  PULMONARY: Clear to auscultation bilaterally; no wheezes, rales. Breathing non-labored  CARDIOVASCULAR: Regular rate and rhythm; no murmurs  GASTROINTESTINAL: Soft, non-tender, non-distended  MUSCULOSKELETAL: Moves 4/4 extremities no cyanosis, no edema, no erythema, induration, or tenderness of posterior calves  NEUROLOGY: non-focal  Skin: intact    ASSESSMENT & PLAN  56-year-old, female patient, PMHx: Hypertension, presented to the ED for Shortness of breath. Histoy goes back to 2 days prior to presentation when the patient started having dyspnea. New-onset, aggravated by exertion, no alleviating factors. No orthopnea. The patient had an associated cough, productive of whitish sputum. She denies any fever ( but was febrile in the ED). No sore throat, no anosmia no loss of taste. No skin contact, no recent travel.     #Acute Hypoxic Respiratory failure secondary to SARS-CoV-2 Pneumonia:   - Currently on HFNC 45/100--wean as tolerated   - s/p RDV x5 days  - s/p Convalescent Plasma  - Rocephin 5-day course completed  - c/w levaquin (QTc 449 on 3/16)  - COVID-19 antibodies negative   - c/w Dexamethasone 6mg IV qd  Ferritin--1148>1173>1365>1094  D-dimer--248>254>5287>49620>10566>5701  Procal--31.9>21.6>14.8>4.08>0.52  CRP--214>220>148>84>139    #Elevated D-dimer--downtrending, appears to have peaked  - Trend shown above  - LE duplex on 3/12, negative for DVT  - c/w therapeutic lovenox    #Hypertension:   - Pt does not know home regimen  - c/w norvasc 5mg qd  - c/w lopressor 25mg BID    #Elevated Troponin --resolved  - Troponin: 0.02 at admission then .03, .04, now .02  - No Active Chest pain  - EKG: Sinus Tachycardia    #Transaminitis:   - AST:77 ALT:31, down trended since admission    #Misc:   - DVT prophylaxis: Lovenox 100mg BID  - GI prophy: Pantoprazole 40mg QD  - Diet: DASH/TLC  - Dispo: Acute    PAST MEDICAL & SURGICAL HISTORY:   PAULA ELMORE 56y Female  MRN#: [id number]   Hospital Day: 8d    SUBJECTIVE  Patient is a 56y old Female who presents with a chief complaint of Shortness of breath (18 Mar 2021 10:07)  Currently admitted to medicine with the primary diagnosis of Acute respiratory failure with hypoxia      INTERVAL HPI AND OVERNIGHT EVENTS:  Patient was examined and seen at bedside. This morning she is resting comfortably in bed.    REVIEW OF SYMPTOMS:  Denies any pain, urinary, or bowel complaints. Endorses adequate PO intake.    OBJECTIVE  PAST MEDICAL & SURGICAL HISTORY    ALLERGIES:  Allergy Status Unknown    MEDICATIONS:  STANDING MEDICATIONS  acetaminophen   Tablet .. 650 milliGRAM(s) Oral once  chlorhexidine 4% Liquid 1 Application(s) Topical daily  dexAMETHasone  Injectable 6 milliGRAM(s) IV Push daily  enoxaparin Injectable 100 milliGRAM(s) SubCutaneous two times a day  insulin glargine Injectable (LANTUS) 20 Unit(s) SubCutaneous at bedtime  insulin lispro (ADMELOG) corrective regimen sliding scale   SubCutaneous three times a day before meals  insulin lispro Injectable (ADMELOG) 3 Unit(s) SubCutaneous three times a day before meals  levoFLOXacin IVPB      levoFLOXacin IVPB 750 milliGRAM(s) IV Intermittent every 24 hours  metoprolol tartrate 25 milliGRAM(s) Oral two times a day  pantoprazole    Tablet 40 milliGRAM(s) Oral before breakfast    PRN MEDICATIONS  acetaminophen   Tablet .. 650 milliGRAM(s) Oral every 6 hours PRN  guaifenesin/dextromethorphan  Syrup 10 milliLiter(s) Oral every 6 hours PRN      VITAL SIGNS: Last 24 Hours  T(C): 37.1 (18 Mar 2021 07:54), Max: 37.1 (18 Mar 2021 07:54)  T(F): 98.8 (18 Mar 2021 07:54), Max: 98.8 (18 Mar 2021 07:54)  HR: 101 (18 Mar 2021 07:54) (80 - 102)  BP: 137/63 (18 Mar 2021 07:54) (113/59 - 145/67)  BP(mean): --  RR: 18 (18 Mar 2021 10:01) (18 - 20)  SpO2: 92% (18 Mar 2021 10:01) (87% - 95%)    LABS:                        11.2   19.57 )-----------( 309      ( 18 Mar 2021 07:44 )             35.2     03-18    137  |  99  |  22<H>  ----------------------------<  167<H>  4.3   |  24  |  0.7    Ca    8.6      18 Mar 2021 07:44  Mg     2.3     03-18    TPro  6.4  /  Alb  3.1<L>  /  TBili  0.4  /  DBili  x   /  AST  48<H>  /  ALT  44<H>  /  AlkPhos  97  03-18                  RADIOLOGY:      PHYSICAL EXAM:  CONSTITUTIONAL: No acute distress, well-developed, AAOx3  HEAD: Atraumatic, normocephalic  EYES: EOM intact  ENT: Supple, no JVD  PULMONARY: Clear to auscultation bilaterally; no wheezes, rales. Breathing non-labored  CARDIOVASCULAR: Regular rate and rhythm; no murmurs  GASTROINTESTINAL: Soft, non-tender, non-distended  MUSCULOSKELETAL: Moves 4/4 extremities no cyanosis, no edema, no erythema, induration, or tenderness of posterior calves  NEUROLOGY: non-focal  Skin: intact    ASSESSMENT & PLAN  56-year-old, female patient, PMHx: Hypertension, presented to the ED for Shortness of breath. Histoy goes back to 2 days prior to presentation when the patient started having dyspnea. New-onset, aggravated by exertion, no alleviating factors. No orthopnea. The patient had an associated cough, productive of whitish sputum. She denies any fever ( but was febrile in the ED). No sore throat, no anosmia no loss of taste. No skin contact, no recent travel.     #Acute Hypoxic Respiratory failure secondary to SARS-CoV-2 Pneumonia:   - Currently on HFNC 45/100--wean as tolerated   - s/p RDV x5 days  - s/p Convalescent Plasma  - Rocephin 5-day course completed  - c/w levaquin (QTc 449 on 3/16)  - COVID-19 antibodies negative   - c/w Dexamethasone 6mg IV qd  Ferritin--1148>1173>1365>1094  D-dimer--248>254>5287>67379>03326>5701  Procal--31.9>21.6>14.8>4.08>0.52  CRP--214>220>148>84>139.    #Elevated D-dimer--downtrending, appears to have peaked  - Trend shown above  - LE duplex on 3/12, negative for DVT  - c/w therapeutic lovenox    #Hypertension:   - Pt does not know home regimen  - c/w norvasc 5mg qd  - c/w lopressor 25mg BID    #Elevated Troponin --resolved  - Troponin: 0.02 at admission then .03, .04, now .02  - No Active Chest pain  - EKG: Sinus Tachycardia    #Transaminitis:   - AST:77 ALT:31, down trended since admission    #Misc:   - DVT prophylaxis: Lovenox 100mg BID  - GI prophy: Pantoprazole 40mg QD  - Diet: DASH/TLC  - Dispo: Acute    PAST MEDICAL & SURGICAL HISTORY:   PAULA ELMORE 56y Female  MRN#: [id number]   Hospital Day: 8d    SUBJECTIVE  Patient is a 56y old Female who presents with a chief complaint of Shortness of breath (18 Mar 2021 10:07)  Currently admitted to medicine with the primary diagnosis of Acute respiratory failure with hypoxia      INTERVAL HPI AND OVERNIGHT EVENTS:  Patient was examined and seen at bedside. This morning she is resting comfortably in bed.    REVIEW OF SYMPTOMS:  Denies any pain, urinary, or bowel complaints. Endorses adequate PO intake.    OBJECTIVE  PAST MEDICAL & SURGICAL HISTORY    ALLERGIES:  Allergy Status Unknown    MEDICATIONS:  STANDING MEDICATIONS  acetaminophen   Tablet .. 650 milliGRAM(s) Oral once  chlorhexidine 4% Liquid 1 Application(s) Topical daily  dexAMETHasone  Injectable 6 milliGRAM(s) IV Push daily  enoxaparin Injectable 100 milliGRAM(s) SubCutaneous two times a day  insulin glargine Injectable (LANTUS) 20 Unit(s) SubCutaneous at bedtime  insulin lispro (ADMELOG) corrective regimen sliding scale   SubCutaneous three times a day before meals  insulin lispro Injectable (ADMELOG) 3 Unit(s) SubCutaneous three times a day before meals  levoFLOXacin IVPB      levoFLOXacin IVPB 750 milliGRAM(s) IV Intermittent every 24 hours  metoprolol tartrate 25 milliGRAM(s) Oral two times a day  pantoprazole    Tablet 40 milliGRAM(s) Oral before breakfast    PRN MEDICATIONS  acetaminophen   Tablet .. 650 milliGRAM(s) Oral every 6 hours PRN  guaifenesin/dextromethorphan  Syrup 10 milliLiter(s) Oral every 6 hours PRN      VITAL SIGNS: Last 24 Hours  T(C): 37.1 (18 Mar 2021 07:54), Max: 37.1 (18 Mar 2021 07:54)  T(F): 98.8 (18 Mar 2021 07:54), Max: 98.8 (18 Mar 2021 07:54)  HR: 101 (18 Mar 2021 07:54) (80 - 102)  BP: 137/63 (18 Mar 2021 07:54) (113/59 - 145/67)  BP(mean): --  RR: 18 (18 Mar 2021 10:01) (18 - 20)  SpO2: 92% (18 Mar 2021 10:01) (87% - 95%)    LABS:                        11.2   19.57 )-----------( 309      ( 18 Mar 2021 07:44 )             35.2     03-18    137  |  99  |  22<H>  ----------------------------<  167<H>  4.3   |  24  |  0.7    Ca    8.6      18 Mar 2021 07:44  Mg     2.3     03-18    TPro  6.4  /  Alb  3.1<L>  /  TBili  0.4  /  DBili  x   /  AST  48<H>  /  ALT  44<H>  /  AlkPhos  97  03-18                  RADIOLOGY:      PHYSICAL EXAM:  CONSTITUTIONAL: No acute distress, well-developed, AAOx3  HEAD: Atraumatic, normocephalic  EYES: EOM intact  ENT: Supple, no JVD  PULMONARY: Clear to auscultation bilaterally; no wheezes, rales. Breathing non-labored  CARDIOVASCULAR: Regular rate and rhythm; no murmurs  GASTROINTESTINAL: Soft, non-tender, non-distended  MUSCULOSKELETAL: Moves 4/4 extremities no cyanosis, no edema, no erythema, induration, or tenderness of posterior calves  NEUROLOGY: non-focal  Skin: intact    ASSESSMENT & PLAN  56-year-old, female patient, PMHx: Hypertension, presented to the ED for Shortness of breath. Histoy goes back to 2 days prior to presentation when the patient started having dyspnea. New-onset, aggravated by exertion, no alleviating factors. No orthopnea. The patient had an associated cough, productive of whitish sputum. She denies any fever ( but was febrile in the ED). No sore throat, no anosmia no loss of taste. No skin contact, no recent travel.     #Acute Hypoxic Respiratory failure secondary to SARS-CoV-2 Pneumonia:   - Currently on HFNC 45/100--wean as tolerated **PT seen persistently attempting to remove HFNC despite being aware that she requires high levels of O2. Attempting to wean flow but regardless of settings, pt insisting its uncomfortable and attempting to remove. She is AOx4 at present time and understands that removing O2 could result in desaturation or possible decompensation**  - s/p RDV x5 days  - s/p Convalescent Plasma  - Rocephin 5-day course completed  - c/w levaquin (QTc 449 on 3/16)  - COVID-19 antibodies negative   - c/w Dexamethasone 6mg IV qd  Ferritin--1148>1173>1365>1094  D-dimer--248>254>5287>77115>40361>5701  Procal--31.9>21.6>14.8>4.08>0.52  CRP--214>220>148>84>139    #Elevated D-dimer--downtrending, appears to have peaked  - Trend shown above  - LE duplex on 3/12, negative for DVT  - c/w therapeutic lovenox    #Hypertension:   - Pt does not know home regimen  - c/w norvasc 5mg qd  - c/w lopressor 25mg BID    #Elevated Troponin --resolved  - Troponin: 0.02 at admission then .03, .04, now .02  - No Active Chest pain  - EKG: Sinus Tachycardia    #Transaminitis:   - AST:77 ALT:31, down trended since admission    #Misc:   - DVT prophylaxis: Lovenox 100mg BID  - GI prophy: Pantoprazole 40mg QD  - Diet: DASH/TLC  - Dispo: Acute    PAST MEDICAL & SURGICAL HISTORY:

## 2021-03-18 NOTE — PROGRESS NOTE ADULT - SUBJECTIVE AND OBJECTIVE BOX
CATARINA, PAULA  56y, Female    All available historical data reviewed    OVERNIGHT EVENTS:  no fevers  HFNC    ROS:  General: Denies rigors, nightsweats  HEENT: Denies headache, rhinorrhea, sore throat, eye pain  CV: Denies CP, palpitations  PULM: Denies wheezing, hemoptysis  GI: Denies hematemesis, hematochezia, melena  : Denies discharge, hematuria  MSK: Denies arthralgias, myalgias  SKIN: Denies rash, lesions  NEURO: Denies paresthesias, weakness  PSYCH: Denies depression, anxiety    VITALS:  T(F): 98.8, Max: 98.8 (03-18-21 @ 07:54)  HR: 95  BP: 121/86  RR: 20Vital Signs Last 24 Hrs  T(C): 37.1 (18 Mar 2021 11:00), Max: 37.1 (18 Mar 2021 07:54)  T(F): 98.8 (18 Mar 2021 11:00), Max: 98.8 (18 Mar 2021 07:54)  HR: 95 (18 Mar 2021 11:00) (80 - 102)  BP: 121/86 (18 Mar 2021 11:00) (113/59 - 145/67)  BP(mean): --  RR: 20 (18 Mar 2021 11:00) (18 - 20)  SpO2: 96% (18 Mar 2021 11:00) (90% - 96%)    TESTS & MEASUREMENTS:                        11.2   19.57 )-----------( 309      ( 18 Mar 2021 07:44 )             35.2     03-18    137  |  99  |  22<H>  ----------------------------<  167<H>  4.3   |  24  |  0.7    Ca    8.6      18 Mar 2021 07:44  Mg     2.3     03-18    TPro  6.4  /  Alb  3.1<L>  /  TBili  0.4  /  DBili  x   /  AST  48<H>  /  ALT  44<H>  /  AlkPhos  97  03-18    LIVER FUNCTIONS - ( 18 Mar 2021 07:44 )  Alb: 3.1 g/dL / Pro: 6.4 g/dL / ALK PHOS: 97 U/L / ALT: 44 U/L / AST: 48 U/L / GGT: x                   RADIOLOGY & ADDITIONAL TESTS:  Personal review of radiological diagnostics performed  Echo and EKG results noted when applicable.     MEDICATIONS:  acetaminophen   Tablet .. 650 milliGRAM(s) Oral every 6 hours PRN  acetaminophen   Tablet .. 650 milliGRAM(s) Oral once  chlorhexidine 4% Liquid 1 Application(s) Topical daily  dexAMETHasone  Injectable 6 milliGRAM(s) IV Push daily  enoxaparin Injectable 100 milliGRAM(s) SubCutaneous two times a day  guaifenesin/dextromethorphan  Syrup 10 milliLiter(s) Oral every 6 hours PRN  insulin glargine Injectable (LANTUS) 20 Unit(s) SubCutaneous at bedtime  insulin lispro (ADMELOG) corrective regimen sliding scale   SubCutaneous three times a day before meals  insulin lispro Injectable (ADMELOG) 3 Unit(s) SubCutaneous three times a day before meals  levoFLOXacin IVPB      levoFLOXacin IVPB 750 milliGRAM(s) IV Intermittent every 24 hours  metoprolol tartrate 25 milliGRAM(s) Oral two times a day  pantoprazole    Tablet 40 milliGRAM(s) Oral before breakfast      ANTIBIOTICS:  levoFLOXacin IVPB      levoFLOXacin IVPB 750 milliGRAM(s) IV Intermittent every 24 hours

## 2021-03-19 LAB
ALBUMIN SERPL ELPH-MCNC: 3.1 G/DL — LOW (ref 3.5–5.2)
ALP SERPL-CCNC: 98 U/L — SIGNIFICANT CHANGE UP (ref 30–115)
ALT FLD-CCNC: 45 U/L — HIGH (ref 0–41)
ANION GAP SERPL CALC-SCNC: 15 MMOL/L — HIGH (ref 7–14)
AST SERPL-CCNC: 44 U/L — HIGH (ref 0–41)
BASOPHILS # BLD AUTO: 0.03 K/UL — SIGNIFICANT CHANGE UP (ref 0–0.2)
BASOPHILS NFR BLD AUTO: 0.2 % — SIGNIFICANT CHANGE UP (ref 0–1)
BILIRUB SERPL-MCNC: 0.3 MG/DL — SIGNIFICANT CHANGE UP (ref 0.2–1.2)
BUN SERPL-MCNC: 21 MG/DL — HIGH (ref 10–20)
CALCIUM SERPL-MCNC: 8.9 MG/DL — SIGNIFICANT CHANGE UP (ref 8.5–10.1)
CHLORIDE SERPL-SCNC: 100 MMOL/L — SIGNIFICANT CHANGE UP (ref 98–110)
CO2 SERPL-SCNC: 23 MMOL/L — SIGNIFICANT CHANGE UP (ref 17–32)
CREAT SERPL-MCNC: 0.7 MG/DL — SIGNIFICANT CHANGE UP (ref 0.7–1.5)
CRP SERPL-MCNC: 182 MG/L — HIGH
EOSINOPHIL # BLD AUTO: 0.02 K/UL — SIGNIFICANT CHANGE UP (ref 0–0.7)
EOSINOPHIL NFR BLD AUTO: 0.2 % — SIGNIFICANT CHANGE UP (ref 0–8)
FERRITIN SERPL-MCNC: 1036 NG/ML — HIGH (ref 15–150)
GLUCOSE BLDC GLUCOMTR-MCNC: 138 MG/DL — HIGH (ref 70–99)
GLUCOSE BLDC GLUCOMTR-MCNC: 153 MG/DL — HIGH (ref 70–99)
GLUCOSE BLDC GLUCOMTR-MCNC: 185 MG/DL — HIGH (ref 70–99)
GLUCOSE BLDC GLUCOMTR-MCNC: 188 MG/DL — HIGH (ref 70–99)
GLUCOSE SERPL-MCNC: 130 MG/DL — HIGH (ref 70–99)
HCT VFR BLD CALC: 36 % — LOW (ref 37–47)
HGB BLD-MCNC: 11.3 G/DL — LOW (ref 12–16)
IMM GRANULOCYTES NFR BLD AUTO: 2.4 % — HIGH (ref 0.1–0.3)
LYMPHOCYTES # BLD AUTO: 1.52 K/UL — SIGNIFICANT CHANGE UP (ref 1.2–3.4)
LYMPHOCYTES # BLD AUTO: 11.4 % — LOW (ref 20.5–51.1)
MAGNESIUM SERPL-MCNC: 2.4 MG/DL — SIGNIFICANT CHANGE UP (ref 1.8–2.4)
MCHC RBC-ENTMCNC: 28.3 PG — SIGNIFICANT CHANGE UP (ref 27–31)
MCHC RBC-ENTMCNC: 31.4 G/DL — LOW (ref 32–37)
MCV RBC AUTO: 90 FL — SIGNIFICANT CHANGE UP (ref 81–99)
MONOCYTES # BLD AUTO: 0.71 K/UL — HIGH (ref 0.1–0.6)
MONOCYTES NFR BLD AUTO: 5.3 % — SIGNIFICANT CHANGE UP (ref 1.7–9.3)
NEUTROPHILS # BLD AUTO: 10.68 K/UL — HIGH (ref 1.4–6.5)
NEUTROPHILS NFR BLD AUTO: 80.5 % — HIGH (ref 42.2–75.2)
NRBC # BLD: 0 /100 WBCS — SIGNIFICANT CHANGE UP (ref 0–0)
PLATELET # BLD AUTO: 355 K/UL — SIGNIFICANT CHANGE UP (ref 130–400)
POTASSIUM SERPL-MCNC: 4.7 MMOL/L — SIGNIFICANT CHANGE UP (ref 3.5–5)
POTASSIUM SERPL-SCNC: 4.7 MMOL/L — SIGNIFICANT CHANGE UP (ref 3.5–5)
PROT SERPL-MCNC: 6.6 G/DL — SIGNIFICANT CHANGE UP (ref 6–8)
RBC # BLD: 4 M/UL — LOW (ref 4.2–5.4)
RBC # FLD: 13 % — SIGNIFICANT CHANGE UP (ref 11.5–14.5)
SODIUM SERPL-SCNC: 138 MMOL/L — SIGNIFICANT CHANGE UP (ref 135–146)
WBC # BLD: 13.28 K/UL — HIGH (ref 4.8–10.8)
WBC # FLD AUTO: 13.28 K/UL — HIGH (ref 4.8–10.8)

## 2021-03-19 PROCEDURE — 99233 SBSQ HOSP IP/OBS HIGH 50: CPT

## 2021-03-19 RX ADMIN — Medication 1: at 07:53

## 2021-03-19 RX ADMIN — Medication 3 UNIT(S): at 12:07

## 2021-03-19 RX ADMIN — ENOXAPARIN SODIUM 100 MILLIGRAM(S): 100 INJECTION SUBCUTANEOUS at 05:00

## 2021-03-19 RX ADMIN — Medication 1: at 12:04

## 2021-03-19 RX ADMIN — Medication 3 UNIT(S): at 15:57

## 2021-03-19 RX ADMIN — Medication 25 MILLIGRAM(S): at 17:17

## 2021-03-19 RX ADMIN — Medication 1: at 15:56

## 2021-03-19 RX ADMIN — Medication 25 MILLIGRAM(S): at 05:00

## 2021-03-19 RX ADMIN — ENOXAPARIN SODIUM 100 MILLIGRAM(S): 100 INJECTION SUBCUTANEOUS at 17:17

## 2021-03-19 RX ADMIN — Medication 6 MILLIGRAM(S): at 05:00

## 2021-03-19 RX ADMIN — INSULIN GLARGINE 20 UNIT(S): 100 INJECTION, SOLUTION SUBCUTANEOUS at 21:51

## 2021-03-19 RX ADMIN — Medication 3 UNIT(S): at 07:53

## 2021-03-19 RX ADMIN — PANTOPRAZOLE SODIUM 40 MILLIGRAM(S): 20 TABLET, DELAYED RELEASE ORAL at 05:00

## 2021-03-19 NOTE — PROGRESS NOTE ADULT - ASSESSMENT
Patient is a 56-year-old, female patient, PMHx: Hypertension, presented to the ED for Shortness of breath and found to have COVID19 PNA    #Acute Hypoxic Respiratory Failure  #COVID 19 PNA  Currently on HFNC 40L/100% saturating 92-93%  s/p RDV therapy  Ddimers elevated to 33K on 03/16, LE duplex negative  - Cont Levaquin (Start 03/12) x 7d, can DC after today  - Cont decadron 6mg qD (Start 03/10)  - Lovenox therapeutic 100mg BID  - Trend inflammatory markers q48-72h  - Taper down oxygen requirements as tolerated    #HTN  #Sinus tachycardia  - Metoprolol tartrate 25 BID  Hypertension with sinus tachy- started on lopressor 25mg po twice daily tx.    #DM2, new onset  Hba1c 6.9 (03/12)  - Lantus/lispro 20-3-3-3  - ISS  - Once downgraded, endocrine consult for outpt tx recommendations    #Progress Note Handoff  Pending (specify): Tapering off HFNC  Family discussion: d/w pt regarding tapering down O2 requirements  Disposition: Requiring HFNC, TBD

## 2021-03-19 NOTE — PROGRESS NOTE ADULT - SUBJECTIVE AND OBJECTIVE BOX
PAULA ELMORE 56y Female  MRN#: [id number]   Hospital Day: 9d    SUBJECTIVE  Patient is a 56y old Female who presents with a chief complaint of Shortness of breath (18 Mar 2021 12:55)  Currently admitted to medicine with the primary diagnosis of Acute respiratory failure with hypoxia      INTERVAL HPI AND OVERNIGHT EVENTS:  Patient was examined and seen at bedside. This morning she is resting comfortably in bed.    REVIEW OF SYMPTOMS:  +dry nose, Denies any pain, urinary, or bowel complaints. Endorses adequate PO intake.    OBJECTIVE  PAST MEDICAL & SURGICAL HISTORY    ALLERGIES:  Allergy Status Unknown    MEDICATIONS:  STANDING MEDICATIONS  acetaminophen   Tablet .. 650 milliGRAM(s) Oral once  chlorhexidine 4% Liquid 1 Application(s) Topical daily  dexAMETHasone  Injectable 6 milliGRAM(s) IV Push daily  enoxaparin Injectable 100 milliGRAM(s) SubCutaneous two times a day  insulin glargine Injectable (LANTUS) 20 Unit(s) SubCutaneous at bedtime  insulin lispro (ADMELOG) corrective regimen sliding scale   SubCutaneous three times a day before meals  insulin lispro Injectable (ADMELOG) 3 Unit(s) SubCutaneous three times a day before meals  metoprolol tartrate 25 milliGRAM(s) Oral two times a day  pantoprazole    Tablet 40 milliGRAM(s) Oral before breakfast    PRN MEDICATIONS  acetaminophen   Tablet .. 650 milliGRAM(s) Oral every 6 hours PRN  guaifenesin/dextromethorphan  Syrup 10 milliLiter(s) Oral every 6 hours PRN      VITAL SIGNS: Last 24 Hours  T(C): 36.9 (19 Mar 2021 08:35), Max: 37.1 (18 Mar 2021 11:00)  T(F): 98.4 (19 Mar 2021 08:35), Max: 98.8 (18 Mar 2021 11:00)  HR: 80 (19 Mar 2021 08:35) (80 - 99)  BP: 133/63 (19 Mar 2021 08:35) (121/86 - 148/84)  BP(mean): --  RR: 20 (19 Mar 2021 08:35) (18 - 20)  SpO2: 95% (19 Mar 2021 08:35) (92% - 98%)    LABS:                        11.3   13.28 )-----------( 355      ( 19 Mar 2021 05:10 )             36.0     03-19    138  |  100  |  21<H>  ----------------------------<  130<H>  4.7   |  23  |  0.7    Ca    8.9      19 Mar 2021 05:10  Mg     2.4     03-19    TPro  6.6  /  Alb  3.1<L>  /  TBili  0.3  /  DBili  x   /  AST  44<H>  /  ALT  45<H>  /  AlkPhos  98  03-19        RADIOLOGY:      PHYSICAL EXAM:  CONSTITUTIONAL: No acute distress, well-developed, AAOx3  HEAD: Atraumatic, normocephalic  EYES: EOM intact  ENT: Supple, no JVD  PULMONARY: Clear to auscultation bilaterally; no wheezes, rales. Breathing non-labored  CARDIOVASCULAR: Regular rate and rhythm; no murmurs  GASTROINTESTINAL: Soft, non-tender, non-distended  MUSCULOSKELETAL: Moves 4/4 extremities no cyanosis, no edema, no erythema, induration, or tenderness of posterior calves  NEUROLOGY: non-focal  Skin: intact    ASSESSMENT & PLAN  56-year-old, female patient, PMHx: Hypertension, presented to the ED for Shortness of breath. Histoy goes back to 2 days prior to presentation when the patient started having dyspnea. New-onset, aggravated by exertion, no alleviating factors. No orthopnea. The patient had an associated cough, productive of whitish sputum. She denies any fever ( but was febrile in the ED). No sore throat, no anosmia no loss of taste. No skin contact, no recent travel.     #Acute Hypoxic Respiratory failure secondary to SARS-CoV-2 Pneumonia:   - Currently on HFNC 40/100--wean as tolerated **PT seen persistently attempting to remove HFNC despite being aware that she requires high levels of O2. Attempting to wean flow but regardless of settings, pt insisting its uncomfortable and attempting to remove. She is AOx4 at present time and understands that removing O2 could result in desaturation or possible decompensation**  - s/p RDV x5 days  - s/p Convalescent Plasma  - Rocephin 5-day course completed  - Final day of levequin  - COVID-19 antibodies negative   - c/w Dexamethasone 6mg IV qd  - s/p Toci 400mg x1 3/18-->f/u ferritin 3/20  Ferritin--1148>1173>1365>1094>1036  D-dimer--248>254>5287>63472>22491>5701  Procal--31.9>21.6>14.8>4.08>0.52  CRP--214>220>148>84>139>182    #Elevated D-dimer--downtrending, appears to have peaked  - Trend shown above  - LE duplex on 3/12, negative for DVT  - c/w therapeutic lovenox    #Hypertension:   - Pt does not know home regimen  - c/w norvasc 5mg qd  - c/w lopressor 25mg BID    #Elevated Troponin --resolved  - Troponin: 0.02 at admission then .03, .04, now .02  - No Active Chest pain  - EKG: Sinus Tachycardia    #Transaminitis:   - AST:77 ALT:31, down trending since admission    #Misc:   - DVT prophylaxis: Lovenox 100mg BID  - GI prophy: Pantoprazole 40mg QD  - Diet: DASH/TLC  - Dispo: Acute    PAST MEDICAL & SURGICAL HISTORY:

## 2021-03-19 NOTE — PROGRESS NOTE ADULT - SUBJECTIVE AND OBJECTIVE BOX
PAULA ELMORE  56y, Female  Allergy: Allergy Status Unknown    Hospital Day: 9d    Patient seen and examined earlier today. No acute events overnight.    PMH/PSH:  PAST MEDICAL & SURGICAL HISTORY:    VITALS:  T(F): 98.4 (03-19-21 @ 08:35), Max: 98.6 (03-19-21 @ 04:57)  HR: 80 (03-19-21 @ 08:35)  BP: 133/63 (03-19-21 @ 08:35) (129/62 - 148/84)  RR: 20 (03-19-21 @ 08:35)  SpO2: 95% (03-19-21 @ 08:35)    TESTS & MEASUREMENTS:  Weight (Kg):     03-17-21 @ 07:01  -  03-18-21 @ 07:00  --------------------------------------------------------  IN: 390 mL / OUT: 1050 mL / NET: -660 mL    03-18-21 @ 07:01  -  03-19-21 @ 07:00  --------------------------------------------------------  IN: 0 mL / OUT: 550 mL / NET: -550 mL                        11.3   13.28 )-----------( 355      ( 19 Mar 2021 05:10 )             36.0     03-19    138  |  100  |  21<H>  ----------------------------<  130<H>  4.7   |  23  |  0.7    Ca    8.9      19 Mar 2021 05:10  Mg     2.4     03-19    TPro  6.6  /  Alb  3.1<L>  /  TBili  0.3  /  DBili  x   /  AST  44<H>  /  ALT  45<H>  /  AlkPhos  98  03-19    LIVER FUNCTIONS - ( 19 Mar 2021 05:10 )  Alb: 3.1 g/dL / Pro: 6.6 g/dL / ALK PHOS: 98 U/L / ALT: 45 U/L / AST: 44 U/L / GGT: x           RECENT DIAGNOSTIC ORDERS:  Ferritin, Serum: AM Sched. Collection: 20-Mar-2021 04:30 (03-18-21 @ 13:57)    MEDICATIONS:  MEDICATIONS  (STANDING):  acetaminophen   Tablet .. 650 milliGRAM(s) Oral once  chlorhexidine 4% Liquid 1 Application(s) Topical daily  dexAMETHasone  Injectable 6 milliGRAM(s) IV Push daily  enoxaparin Injectable 100 milliGRAM(s) SubCutaneous two times a day  insulin glargine Injectable (LANTUS) 20 Unit(s) SubCutaneous at bedtime  insulin lispro (ADMELOG) corrective regimen sliding scale   SubCutaneous three times a day before meals  insulin lispro Injectable (ADMELOG) 3 Unit(s) SubCutaneous three times a day before meals  metoprolol tartrate 25 milliGRAM(s) Oral two times a day  pantoprazole    Tablet 40 milliGRAM(s) Oral before breakfast    MEDICATIONS  (PRN):  acetaminophen   Tablet .. 650 milliGRAM(s) Oral every 6 hours PRN Temp greater or equal to 38C (100.4F), Mild Pain (1 - 3)  guaifenesin/dextromethorphan  Syrup 10 milliLiter(s) Oral every 6 hours PRN Cough      HOME MEDICATIONS:      REVIEW OF SYSTEMS:  All other review of systems is negative unless indicated above.     PHYSICAL EXAM:  GENERAL: NAD  HEENT: No Swelling  CHEST/LUNG: Good air entry, No wheezing  HEART: RRR, No murmurs  ABDOMEN: Soft, Bowel sounds present  EXTREMITIES:  No clubbing

## 2021-03-20 LAB
ALBUMIN SERPL ELPH-MCNC: 3.1 G/DL — LOW (ref 3.5–5.2)
ALP SERPL-CCNC: 99 U/L — SIGNIFICANT CHANGE UP (ref 30–115)
ALT FLD-CCNC: 57 U/L — HIGH (ref 0–41)
ANION GAP SERPL CALC-SCNC: 10 MMOL/L — SIGNIFICANT CHANGE UP (ref 7–14)
AST SERPL-CCNC: 53 U/L — HIGH (ref 0–41)
BASOPHILS # BLD AUTO: 0.03 K/UL — SIGNIFICANT CHANGE UP (ref 0–0.2)
BASOPHILS NFR BLD AUTO: 0.3 % — SIGNIFICANT CHANGE UP (ref 0–1)
BILIRUB SERPL-MCNC: 0.3 MG/DL — SIGNIFICANT CHANGE UP (ref 0.2–1.2)
BUN SERPL-MCNC: 25 MG/DL — HIGH (ref 10–20)
CALCIUM SERPL-MCNC: 9 MG/DL — SIGNIFICANT CHANGE UP (ref 8.5–10.1)
CHLORIDE SERPL-SCNC: 103 MMOL/L — SIGNIFICANT CHANGE UP (ref 98–110)
CO2 SERPL-SCNC: 25 MMOL/L — SIGNIFICANT CHANGE UP (ref 17–32)
CREAT SERPL-MCNC: 0.7 MG/DL — SIGNIFICANT CHANGE UP (ref 0.7–1.5)
D DIMER BLD IA.RAPID-MCNC: 6609 NG/ML DDU — HIGH (ref 0–230)
EOSINOPHIL # BLD AUTO: 0.03 K/UL — SIGNIFICANT CHANGE UP (ref 0–0.7)
EOSINOPHIL NFR BLD AUTO: 0.3 % — SIGNIFICANT CHANGE UP (ref 0–8)
GLUCOSE BLDC GLUCOMTR-MCNC: 110 MG/DL — HIGH (ref 70–99)
GLUCOSE BLDC GLUCOMTR-MCNC: 172 MG/DL — HIGH (ref 70–99)
GLUCOSE BLDC GLUCOMTR-MCNC: 235 MG/DL — HIGH (ref 70–99)
GLUCOSE SERPL-MCNC: 200 MG/DL — HIGH (ref 70–99)
HCT VFR BLD CALC: 36.3 % — LOW (ref 37–47)
HGB BLD-MCNC: 11.3 G/DL — LOW (ref 12–16)
IMM GRANULOCYTES NFR BLD AUTO: 1.9 % — HIGH (ref 0.1–0.3)
LYMPHOCYTES # BLD AUTO: 0.68 K/UL — LOW (ref 1.2–3.4)
LYMPHOCYTES # BLD AUTO: 6.3 % — LOW (ref 20.5–51.1)
MAGNESIUM SERPL-MCNC: 2.3 MG/DL — SIGNIFICANT CHANGE UP (ref 1.8–2.4)
MCHC RBC-ENTMCNC: 28.8 PG — SIGNIFICANT CHANGE UP (ref 27–31)
MCHC RBC-ENTMCNC: 31.1 G/DL — LOW (ref 32–37)
MCV RBC AUTO: 92.4 FL — SIGNIFICANT CHANGE UP (ref 81–99)
MONOCYTES # BLD AUTO: 0.17 K/UL — SIGNIFICANT CHANGE UP (ref 0.1–0.6)
MONOCYTES NFR BLD AUTO: 1.6 % — LOW (ref 1.7–9.3)
NEUTROPHILS # BLD AUTO: 9.65 K/UL — HIGH (ref 1.4–6.5)
NEUTROPHILS NFR BLD AUTO: 89.6 % — HIGH (ref 42.2–75.2)
NRBC # BLD: 0 /100 WBCS — SIGNIFICANT CHANGE UP (ref 0–0)
PLATELET # BLD AUTO: 359 K/UL — SIGNIFICANT CHANGE UP (ref 130–400)
POTASSIUM SERPL-MCNC: 5 MMOL/L — SIGNIFICANT CHANGE UP (ref 3.5–5)
POTASSIUM SERPL-SCNC: 5 MMOL/L — SIGNIFICANT CHANGE UP (ref 3.5–5)
PROCALCITONIN SERPL-MCNC: 0.16 NG/ML — HIGH (ref 0.02–0.1)
PROT SERPL-MCNC: 6.4 G/DL — SIGNIFICANT CHANGE UP (ref 6–8)
RBC # BLD: 3.93 M/UL — LOW (ref 4.2–5.4)
RBC # FLD: 13.2 % — SIGNIFICANT CHANGE UP (ref 11.5–14.5)
SODIUM SERPL-SCNC: 138 MMOL/L — SIGNIFICANT CHANGE UP (ref 135–146)
WBC # BLD: 10.76 K/UL — SIGNIFICANT CHANGE UP (ref 4.8–10.8)
WBC # FLD AUTO: 10.76 K/UL — SIGNIFICANT CHANGE UP (ref 4.8–10.8)

## 2021-03-20 PROCEDURE — 99233 SBSQ HOSP IP/OBS HIGH 50: CPT

## 2021-03-20 RX ADMIN — Medication 3 UNIT(S): at 17:37

## 2021-03-20 RX ADMIN — Medication 1: at 17:37

## 2021-03-20 RX ADMIN — ENOXAPARIN SODIUM 100 MILLIGRAM(S): 100 INJECTION SUBCUTANEOUS at 05:07

## 2021-03-20 RX ADMIN — Medication 3 UNIT(S): at 11:47

## 2021-03-20 RX ADMIN — CHLORHEXIDINE GLUCONATE 1 APPLICATION(S): 213 SOLUTION TOPICAL at 11:52

## 2021-03-20 RX ADMIN — Medication 325 MILLIGRAM(S): at 11:50

## 2021-03-20 RX ADMIN — Medication 2: at 11:47

## 2021-03-20 RX ADMIN — INSULIN GLARGINE 20 UNIT(S): 100 INJECTION, SOLUTION SUBCUTANEOUS at 22:31

## 2021-03-20 RX ADMIN — Medication 650 MILLIGRAM(S): at 12:50

## 2021-03-20 RX ADMIN — Medication 25 MILLIGRAM(S): at 05:04

## 2021-03-20 RX ADMIN — PANTOPRAZOLE SODIUM 40 MILLIGRAM(S): 20 TABLET, DELAYED RELEASE ORAL at 10:18

## 2021-03-20 RX ADMIN — ENOXAPARIN SODIUM 100 MILLIGRAM(S): 100 INJECTION SUBCUTANEOUS at 17:39

## 2021-03-20 RX ADMIN — Medication 6 MILLIGRAM(S): at 05:05

## 2021-03-20 RX ADMIN — Medication 25 MILLIGRAM(S): at 17:37

## 2021-03-20 NOTE — PROGRESS NOTE ADULT - SUBJECTIVE AND OBJECTIVE BOX
CATARINA, PAULA  56y, Female  Allergy: Allergy Status Unknown    Hospital Day: 10d    Patient seen and examined earlier today. No acute events overnight.    PMH/PSH:  PAST MEDICAL & SURGICAL HISTORY:    VITALS:  T(F): 95.5 (03-20-21 @ 09:14), Max: 99.7 (03-19-21 @ 16:06)  HR: 75 (03-20-21 @ 10:00)  BP: 125/60 (03-20-21 @ 10:00) (119/58 - 168/67)  RR: 22 (03-20-21 @ 10:00)  SpO2: 97% (03-20-21 @ 10:00)    TESTS & MEASUREMENTS:  Weight (Kg):     03-18-21 @ 07:01  -  03-19-21 @ 07:00  --------------------------------------------------------  IN: 0 mL / OUT: 550 mL / NET: -550 mL    03-19-21 @ 07:01  -  03-20-21 @ 07:00  --------------------------------------------------------  IN: 0 mL / OUT: 600 mL / NET: -600 mL                        11.3   10.76 )-----------( 359      ( 20 Mar 2021 09:12 )             36.3     03-19    138  |  100  |  21<H>  ----------------------------<  130<H>  4.7   |  23  |  0.7    Ca    8.9      19 Mar 2021 05:10  Mg     2.4     03-19    TPro  6.6  /  Alb  3.1<L>  /  TBili  0.3  /  DBili  x   /  AST  44<H>  /  ALT  45<H>  /  AlkPhos  98  03-19    LIVER FUNCTIONS - ( 19 Mar 2021 05:10 )  Alb: 3.1 g/dL / Pro: 6.6 g/dL / ALK PHOS: 98 U/L / ALT: 45 U/L / AST: 44 U/L / GGT: x           RECENT DIAGNOSTIC ORDERS:  Magnesium, Serum: AM Sched. Collection: 22-Mar-2021 04:30 (03-19-21 @ 12:31)  Magnesium, Serum: AM Sched. Collection: 21-Mar-2021 04:30 (03-19-21 @ 12:31)  Comprehensive Metabolic Panel: AM Sched. Collection: 22-Mar-2021 04:30 (03-19-21 @ 12:31)  Comprehensive Metabolic Panel: AM Sched. Collection: 21-Mar-2021 04:30 (03-19-21 @ 12:31)  Complete Blood Count + Automated Diff: AM Sched. Collection: 22-Mar-2021 04:30 (03-19-21 @ 12:31)  Complete Blood Count + Automated Diff: AM Sched. Collection: 21-Mar-2021 04:30 (03-19-21 @ 12:31)  D-Dimer Assay, Quantitative:  Start Date:20-Mar-2021. AM Sched. Collection:20-Mar-2021 04:30 (03-19-21 @ 12:31)  Procalcitonin, Serum: AM Sched. Collection: 20-Mar-2021 04:30 (03-19-21 @ 12:31)  Magnesium, Serum: AM Sched. Collection: 20-Mar-2021 04:30 (03-19-21 @ 12:31)  Comprehensive Metabolic Panel: AM Sched. Collection: 20-Mar-2021 04:30 (03-19-21 @ 12:31)    MEDICATIONS:  MEDICATIONS  (STANDING):  acetaminophen   Tablet .. 650 milliGRAM(s) Oral once  chlorhexidine 4% Liquid 1 Application(s) Topical daily  dexAMETHasone  Injectable 6 milliGRAM(s) IV Push daily  enoxaparin Injectable 100 milliGRAM(s) SubCutaneous two times a day  insulin glargine Injectable (LANTUS) 20 Unit(s) SubCutaneous at bedtime  insulin lispro (ADMELOG) corrective regimen sliding scale   SubCutaneous three times a day before meals  insulin lispro Injectable (ADMELOG) 3 Unit(s) SubCutaneous three times a day before meals  metoprolol tartrate 25 milliGRAM(s) Oral two times a day  pantoprazole    Tablet 40 milliGRAM(s) Oral before breakfast    MEDICATIONS  (PRN):  acetaminophen   Tablet .. 650 milliGRAM(s) Oral every 6 hours PRN Temp greater or equal to 38C (100.4F), Mild Pain (1 - 3)  guaifenesin/dextromethorphan  Syrup 10 milliLiter(s) Oral every 6 hours PRN Cough      HOME MEDICATIONS:      REVIEW OF SYSTEMS:  All other review of systems is negative unless indicated above.     PHYSICAL EXAM:  GENERAL: NAD  HEENT: No Swelling  CHEST/LUNG: Good air entry, No wheezing  HEART: RRR, No murmurs  ABDOMEN: Soft, Bowel sounds present  EXTREMITIES:  No clubbing

## 2021-03-20 NOTE — CHART NOTE - NSCHARTNOTEFT_GEN_A_CORE
Registered Dietitian Follow-Up     Patient Profile Reviewed                           Yes [x]   No []     Nutrition History Previously Obtained        Yes [x]  No []       Pertinent Subjective Information: Unable to conduct face to face assessment d/t COVID-19 isolation precautions. Unable to reach pt via phone number in chart & room phone. As per RN pt on 100% NRB at this time, difficult for her to communicate. Per RN pt tolerates HFNC for meals & intake varies based on meal options & appetite. RN notes pt particular w. food, ate all of her chicken w lunch today but not pasta, ate soup as well. Pt likes Glucerna so RN provides PRN, can increase supplement delivery since meal intake varied. Recs d/w LIP on call Dr. Nuno.      Pertinent Medical Interventions:  1. Acute Hypoxic Respiratory Failure 2/2 COVID PNA--currently on 100% NRB satting 94-95%. HFNC PRN; s/p RDV  2. HTN; sinus tachycardia--started on metoprolol  3. T2DM, new onset--endocrine c/s once downgraded      Diet order: DASH/TLC, CHO Consistent + Glucerna q24H      Anthropometrics:  - Ht. 172.7cm  - Wt. 99.6kg (3/11/21)   - BMI 33.4  - IBW 63.6kg      Pertinent Lab Data: (3/20/21) RBC 3.93, H/H 11.3/36.3, POCT 235, glucose 200, BUN 25, AST 53, ALT 57      Pertinent Meds: lovenox, lantus, admelog, metoprolol, decadron, protonix      Physical Findings:  - Appearance: alert but disoriented per RN   - GI function: constipation as no BM since admit, now x10d if accurate   - Tubes: n/a   - Oral/Mouth cavity:  - Skin: 2+ edema to b/l legs as per RN      Nutrition Requirements  Weight Used: 63.6kg IBW d/t BMI >30     Energy: 3638-2038 kcal/day (25-30 kcal/kg IBW).   Protein: 76-89 g/day (1.2-1.4 g/kg IBW). **needs adjusted to be more appropriate to pt ABW & considering COVID w. critical illness**   Fluids: 1 mL/kcal or per LIP     Nutrient Intake: intake varied      [x] Previous Nutrition Diagnosis: Inadequate Oral Intake            [x] Ongoing          [] Resolved     Nutrition Intervention: meals & snacks, medical food supplements  Recommend:  1. Increase Glucerna TID since pt drinks supplement consistently.  2. Continue DASH/TLC, CHO Consistent diet order.   3. check serum vitamin D & supplement PRN.      Goal/Expected Outcome: Pt to consistently consume >60% meals & supplements upon f/u in 3 days.      Indicator/Monitoring: diet order, energy intake, nutrition related labs, body composition, NFPF

## 2021-03-20 NOTE — PROGRESS NOTE ADULT - ASSESSMENT
Patient is a 56-year-old, female patient, PMHx: Hypertension, presented to the ED for Shortness of breath and found to have COVID19 PNA    #Acute Hypoxic Respiratory Failure  #COVID 19 PNA  Currently on % saturating 94-95%  s/p RDV therapy  Ddimers elevated to 33K on 03/16, LE duplex negative  s/p Levaquin x7d (03/12-03/19)  - Cont decadron 6mg qD (Start 03/10)  - Lovenox therapeutic 100mg BID  - Trend inflammatory markers q48-72h  - Taper down oxygen requirements as tolerated    #HTN  #Sinus tachycardia  - Metoprolol tartrate 25 BID  Hypertension with sinus tachy- started on lopressor 25mg po twice daily tx.    #DM2, new onset  Hba1c 6.9 (03/12)  - Lantus/lispro 20-3-3-3  - ISS  - Once downgraded, endocrine consult for outpt tx recommendations    #Progress Note Handoff  Pending (specify): Tapering off NRB and HFNC  Family discussion: d/w pt regarding tapering down O2 requirements  Disposition: Requiring HFNC, TBD

## 2021-03-21 LAB
ALBUMIN SERPL ELPH-MCNC: 3.3 G/DL — LOW (ref 3.5–5.2)
ALP SERPL-CCNC: 97 U/L — SIGNIFICANT CHANGE UP (ref 30–115)
ALT FLD-CCNC: 54 U/L — HIGH (ref 0–41)
ANION GAP SERPL CALC-SCNC: 12 MMOL/L — SIGNIFICANT CHANGE UP (ref 7–14)
AST SERPL-CCNC: 48 U/L — HIGH (ref 0–41)
BASOPHILS # BLD AUTO: 0.02 K/UL — SIGNIFICANT CHANGE UP (ref 0–0.2)
BASOPHILS NFR BLD AUTO: 0.2 % — SIGNIFICANT CHANGE UP (ref 0–1)
BILIRUB SERPL-MCNC: 0.3 MG/DL — SIGNIFICANT CHANGE UP (ref 0.2–1.2)
BUN SERPL-MCNC: 26 MG/DL — HIGH (ref 10–20)
CALCIUM SERPL-MCNC: 9.2 MG/DL — SIGNIFICANT CHANGE UP (ref 8.5–10.1)
CHLORIDE SERPL-SCNC: 102 MMOL/L — SIGNIFICANT CHANGE UP (ref 98–110)
CO2 SERPL-SCNC: 25 MMOL/L — SIGNIFICANT CHANGE UP (ref 17–32)
CREAT SERPL-MCNC: 0.9 MG/DL — SIGNIFICANT CHANGE UP (ref 0.7–1.5)
EOSINOPHIL # BLD AUTO: 0.01 K/UL — SIGNIFICANT CHANGE UP (ref 0–0.7)
EOSINOPHIL NFR BLD AUTO: 0.1 % — SIGNIFICANT CHANGE UP (ref 0–8)
GLUCOSE BLDC GLUCOMTR-MCNC: 103 MG/DL — HIGH (ref 70–99)
GLUCOSE BLDC GLUCOMTR-MCNC: 127 MG/DL — HIGH (ref 70–99)
GLUCOSE BLDC GLUCOMTR-MCNC: 161 MG/DL — HIGH (ref 70–99)
GLUCOSE BLDC GLUCOMTR-MCNC: 262 MG/DL — HIGH (ref 70–99)
GLUCOSE SERPL-MCNC: 257 MG/DL — HIGH (ref 70–99)
HCT VFR BLD CALC: 37.8 % — SIGNIFICANT CHANGE UP (ref 37–47)
HGB BLD-MCNC: 11.9 G/DL — LOW (ref 12–16)
IMM GRANULOCYTES NFR BLD AUTO: 1.7 % — HIGH (ref 0.1–0.3)
LYMPHOCYTES # BLD AUTO: 0.88 K/UL — LOW (ref 1.2–3.4)
LYMPHOCYTES # BLD AUTO: 7 % — LOW (ref 20.5–51.1)
MAGNESIUM SERPL-MCNC: 2.4 MG/DL — SIGNIFICANT CHANGE UP (ref 1.8–2.4)
MCHC RBC-ENTMCNC: 29.1 PG — SIGNIFICANT CHANGE UP (ref 27–31)
MCHC RBC-ENTMCNC: 31.5 G/DL — LOW (ref 32–37)
MCV RBC AUTO: 92.4 FL — SIGNIFICANT CHANGE UP (ref 81–99)
MONOCYTES # BLD AUTO: 0.13 K/UL — SIGNIFICANT CHANGE UP (ref 0.1–0.6)
MONOCYTES NFR BLD AUTO: 1 % — LOW (ref 1.7–9.3)
NEUTROPHILS # BLD AUTO: 11.36 K/UL — HIGH (ref 1.4–6.5)
NEUTROPHILS NFR BLD AUTO: 90 % — HIGH (ref 42.2–75.2)
NRBC # BLD: 0 /100 WBCS — SIGNIFICANT CHANGE UP (ref 0–0)
PLATELET # BLD AUTO: 407 K/UL — HIGH (ref 130–400)
POTASSIUM SERPL-MCNC: 4.8 MMOL/L — SIGNIFICANT CHANGE UP (ref 3.5–5)
POTASSIUM SERPL-SCNC: 4.8 MMOL/L — SIGNIFICANT CHANGE UP (ref 3.5–5)
PROT SERPL-MCNC: 6.8 G/DL — SIGNIFICANT CHANGE UP (ref 6–8)
RBC # BLD: 4.09 M/UL — LOW (ref 4.2–5.4)
RBC # FLD: 13.3 % — SIGNIFICANT CHANGE UP (ref 11.5–14.5)
SODIUM SERPL-SCNC: 139 MMOL/L — SIGNIFICANT CHANGE UP (ref 135–146)
WBC # BLD: 12.62 K/UL — HIGH (ref 4.8–10.8)
WBC # FLD AUTO: 12.62 K/UL — HIGH (ref 4.8–10.8)

## 2021-03-21 PROCEDURE — 99233 SBSQ HOSP IP/OBS HIGH 50: CPT

## 2021-03-21 RX ADMIN — Medication 3 UNIT(S): at 16:51

## 2021-03-21 RX ADMIN — ENOXAPARIN SODIUM 100 MILLIGRAM(S): 100 INJECTION SUBCUTANEOUS at 05:21

## 2021-03-21 RX ADMIN — Medication 25 MILLIGRAM(S): at 05:20

## 2021-03-21 RX ADMIN — PANTOPRAZOLE SODIUM 40 MILLIGRAM(S): 20 TABLET, DELAYED RELEASE ORAL at 05:20

## 2021-03-21 RX ADMIN — INSULIN GLARGINE 20 UNIT(S): 100 INJECTION, SOLUTION SUBCUTANEOUS at 22:32

## 2021-03-21 RX ADMIN — Medication 650 MILLIGRAM(S): at 14:01

## 2021-03-21 RX ADMIN — Medication 25 MILLIGRAM(S): at 17:09

## 2021-03-21 RX ADMIN — Medication 1: at 16:50

## 2021-03-21 RX ADMIN — CHLORHEXIDINE GLUCONATE 1 APPLICATION(S): 213 SOLUTION TOPICAL at 11:45

## 2021-03-21 RX ADMIN — Medication 6 MILLIGRAM(S): at 05:20

## 2021-03-21 RX ADMIN — Medication 650 MILLIGRAM(S): at 14:15

## 2021-03-21 RX ADMIN — Medication 3 UNIT(S): at 07:58

## 2021-03-21 RX ADMIN — Medication 3: at 11:44

## 2021-03-21 RX ADMIN — Medication 3 UNIT(S): at 11:44

## 2021-03-21 RX ADMIN — ENOXAPARIN SODIUM 100 MILLIGRAM(S): 100 INJECTION SUBCUTANEOUS at 17:09

## 2021-03-21 NOTE — PROGRESS NOTE ADULT - ASSESSMENT
Patient is a 56-year-old, female patient, PMHx: Hypertension, presented to the ED for Shortness of breath and found to have COVID19 PNA    #Acute Hypoxic Respiratory Failure  #COVID 19 PNA  Currently on % saturating 94-95%, pt does not tolerate HFNC  s/p RDV therapy  Ddimers elevated to 33K on 03/16, LE duplex negative  s/p Levaquin x7d (03/12-03/19)  - Cont decadron 6mg qD (Start 03/10)  - Lovenox therapeutic 100mg BID  - Trend inflammatory markers q48-72h  - Taper down oxygen requirements as tolerated    #HTN  #Sinus tachycardia  - Metoprolol tartrate 25 BID  Hypertension with sinus tachy- started on lopressor 25mg po twice daily tx.    #DM2, new onset  Hba1c 6.9 (03/12)  - Lantus/lispro 20-3-3-3  - ISS  - Once downgraded, endocrine consult for outpt tx recommendations    #Progress Note Handoff  Pending (specify): Tapering off NRB and HFNC  Family discussion: d/w pt regarding tapering down O2 requirements  Disposition: Requiring HFNC, TBD

## 2021-03-21 NOTE — PROGRESS NOTE ADULT - SUBJECTIVE AND OBJECTIVE BOX
PAULA ELMORE 56y Female  MRN#: [id number]   Hospital Day: 11d    SUBJECTIVE  Patient is a 56y old Female who presents with a chief complaint of Shortness of breath (21 Mar 2021 10:17)  Currently admitted to medicine with the primary diagnosis of Acute respiratory failure with hypoxia      INTERVAL HPI AND OVERNIGHT EVENTS:  Patient was examined and seen at bedside. This morning she is resting comfortably in bed.  Supplemental O2 weaned to NRB @15LPM    REVIEW OF SYMPTOMS:  +dry nose, Denies any pain, urinary, or bowel complaints. Endorses adequate PO intake.    OBJECTIVE  PAST MEDICAL & SURGICAL HISTORY    ALLERGIES:  Allergy Status Unknown    MEDICATIONS:  STANDING MEDICATIONS  acetaminophen   Tablet .. 650 milliGRAM(s) Oral once  chlorhexidine 4% Liquid 1 Application(s) Topical daily  dexAMETHasone  Injectable 6 milliGRAM(s) IV Push daily  enoxaparin Injectable 100 milliGRAM(s) SubCutaneous two times a day  insulin glargine Injectable (LANTUS) 20 Unit(s) SubCutaneous at bedtime  insulin lispro (ADMELOG) corrective regimen sliding scale   SubCutaneous three times a day before meals  insulin lispro Injectable (ADMELOG) 3 Unit(s) SubCutaneous three times a day before meals  metoprolol tartrate 25 milliGRAM(s) Oral two times a day  pantoprazole    Tablet 40 milliGRAM(s) Oral before breakfast    PRN MEDICATIONS  acetaminophen   Tablet .. 650 milliGRAM(s) Oral every 6 hours PRN  guaifenesin/dextromethorphan  Syrup 10 milliLiter(s) Oral every 6 hours PRN      VITAL SIGNS: Last 24 Hours  T(C): 36.9 (21 Mar 2021 09:12), Max: 37 (21 Mar 2021 00:01)  T(F): 98.5 (21 Mar 2021 09:12), Max: 98.6 (21 Mar 2021 00:01)  HR: 67 (21 Mar 2021 09:12) (67 - 80)  BP: 140/65 (21 Mar 2021 09:12) (122/60 - 140/65)  BP(mean): 86 (20 Mar 2021 18:00) (81 - 86)  RR: 24 (21 Mar 2021 09:12) (20 - 24)  SpO2: 95% (21 Mar 2021 09:12) (94% - 98%)    LABS:                        11.9   12.62 )-----------( 407      ( 21 Mar 2021 09:50 )             37.8     03-20    138  |  103  |  25<H>  ----------------------------<  200<H>  5.0   |  25  |  0.7    Ca    9.0      20 Mar 2021 09:12  Mg     2.3     03-20    TPro  6.4  /  Alb  3.1<L>  /  TBili  0.3  /  DBili  x   /  AST  53<H>  /  ALT  57<H>  /  AlkPhos  99  03-20                  RADIOLOGY:      PHYSICAL EXAM:  CONSTITUTIONAL: No acute distress, well-developed, AAOx3  HEAD: Atraumatic, normocephalic  EYES: EOM intact  ENT: Supple, no JVD  PULMONARY: Clear to auscultation bilaterally; no wheezes, rales. Breathing non-labored  CARDIOVASCULAR: Regular rate and rhythm; no murmurs  GASTROINTESTINAL: Soft, non-tender, non-distended  MUSCULOSKELETAL: Moves 4/4 extremities no cyanosis, no edema, no erythema, induration, or tenderness of posterior calves  NEUROLOGY: non-focal  Skin: intact    ASSESSMENT & PLAN  56-year-old, female patient, PMHx: Hypertension, presented to the ED for Shortness of breath. Histoy goes back to 2 days prior to presentation when the patient started having dyspnea. New-onset, aggravated by exertion, no alleviating factors. No orthopnea. The patient had an associated cough, productive of whitish sputum. She denies any fever ( but was febrile in the ED). No sore throat, no anosmia no loss of taste. No skin contact, no recent travel.     #Acute Hypoxic Respiratory failure secondary to SARS-CoV-2 Pneumonia:   - Currently on NRB @15 LPM  - s/p RDV x5 days  - s/p Convalescent Plasma  - Rocephin 5-day course completed  - Completed levaquin x7 days  - COVID-19 antibodies negative   - c/w Dexamethasone 6mg IV qd  - s/p Toci 400mg x1 3/18-->f/u ferritin 3/20-->Still marked "pending collection" unclear why (all other inflammatory markers collected w/o issue)  Ferritin--1148>1173>1365>1094>1036  D-dimer--248>254>5287>36729>03574>5701>6609  Procal--31.9>21.6>14.8>4.08>0.52>0.16  CRP--214>220>148>84>139>182    #Elevated D-dimer--downtrending, appears to have peaked  - Trend shown above  - LE duplex on 3/12, negative for DVT  - c/w therapeutic lovenox    #Hypertension:   - Pt does not know home regimen  - c/w norvasc 5mg qd  - c/w lopressor 25mg BID    #Elevated Troponin --resolved  - Troponin: 0.02 at admission then .03, .04, now .02  - No Active Chest pain  - EKG: Sinus Tachycardia    #Transaminitis:   - AST:77 ALT:31, down trending since admission    #Misc:   - DVT prophylaxis: Lovenox 100mg BID  - GI prophy: Pantoprazole 40mg QD  - Diet: DASH/TLC  - Dispo: Acute    PAST MEDICAL & SURGICAL HISTORY:

## 2021-03-21 NOTE — PROGRESS NOTE ADULT - SUBJECTIVE AND OBJECTIVE BOX
PAULA ELMORE  56y, Female  Allergy: Allergy Status Unknown    Hospital Day: 11d    Patient seen and examined earlier today. No acute events overnight.    PMH/PSH:  PAST MEDICAL & SURGICAL HISTORY:  VITALs:  T(F): 98.5 (03-21-21 @ 09:12), Max: 98.6 (03-21-21 @ 00:01)  HR: 67 (03-21-21 @ 09:12)  BP: 140/65 (03-21-21 @ 09:12) (122/60 - 140/65)  RR: 24 (03-21-21 @ 09:12)  SpO2: 95% (03-21-21 @ 09:12)    TESTS & MEASUREMENTS:  Weight (Kg):     03-19-21 @ 07:01  -  03-20-21 @ 07:00  --------------------------------------------------------  IN: 0 mL / OUT: 600 mL / NET: -600 mL    03-20-21 @ 07:01  -  03-21-21 @ 07:00  --------------------------------------------------------  IN: 1440 mL / OUT: 1200 mL / NET: 240 mL    03-21-21 @ 07:01  -  03-21-21 @ 10:17  --------------------------------------------------------  IN: 240 mL / OUT: 0 mL / NET: 240 mL                        11.9   12.62 )-----------( 407      ( 21 Mar 2021 09:50 )             37.8     03-20    138  |  103  |  25<H>  ----------------------------<  200<H>  5.0   |  25  |  0.7    Ca    9.0      20 Mar 2021 09:12  Mg     2.3     03-20    TPro  6.4  /  Alb  3.1<L>  /  TBili  0.3  /  DBili  x   /  AST  53<H>  /  ALT  57<H>  /  AlkPhos  99  03-20    LIVER FUNCTIONS - ( 20 Mar 2021 09:12 )  Alb: 3.1 g/dL / Pro: 6.4 g/dL / ALK PHOS: 99 U/L / ALT: 57 U/L / AST: 53 U/L / GGT: x           RECENT DIAGNOSTIC ORDERS:  Diet, Consistent Carbohydrate w/Evening Snack:   DASH/TLC Sodium & Cholesterol Restricted  Supplement Feeding Modality:  Oral  Glucerna Shake Cans or Servings Per Day:  1       Frequency:  Three Times a day (03-20-21 @ 15:51)    MEDICATIONS:  MEDICATIONS  (STANDING):  acetaminophen   Tablet .. 650 milliGRAM(s) Oral once  chlorhexidine 4% Liquid 1 Application(s) Topical daily  dexAMETHasone  Injectable 6 milliGRAM(s) IV Push daily  enoxaparin Injectable 100 milliGRAM(s) SubCutaneous two times a day  insulin glargine Injectable (LANTUS) 20 Unit(s) SubCutaneous at bedtime  insulin lispro (ADMELOG) corrective regimen sliding scale   SubCutaneous three times a day before meals  insulin lispro Injectable (ADMELOG) 3 Unit(s) SubCutaneous three times a day before meals  metoprolol tartrate 25 milliGRAM(s) Oral two times a day  pantoprazole    Tablet 40 milliGRAM(s) Oral before breakfast    MEDICATIONS  (PRN):  acetaminophen   Tablet .. 650 milliGRAM(s) Oral every 6 hours PRN Temp greater or equal to 38C (100.4F), Mild Pain (1 - 3)  guaifenesin/dextromethorphan  Syrup 10 milliLiter(s) Oral every 6 hours PRN Cough      HOME MEDICATIONS:      REVIEW OF SYSTEMS:  All other review of systems is negative unless indicated above.     PHYSICAL EXAM:  GENERAL: NAD  HEENT: No Swelling  CHEST/LUNG: Good air entry, No wheezing  HEART: RRR, No murmurs  ABDOMEN: Soft, Bowel sounds present  EXTREMITIES:  No clubbing

## 2021-03-22 LAB
ALBUMIN SERPL ELPH-MCNC: 3.1 G/DL — LOW (ref 3.5–5.2)
ALP SERPL-CCNC: 85 U/L — SIGNIFICANT CHANGE UP (ref 30–115)
ALT FLD-CCNC: 47 U/L — HIGH (ref 0–41)
ANION GAP SERPL CALC-SCNC: 13 MMOL/L — SIGNIFICANT CHANGE UP (ref 7–14)
AST SERPL-CCNC: 38 U/L — SIGNIFICANT CHANGE UP (ref 0–41)
BASOPHILS # BLD AUTO: 0.02 K/UL — SIGNIFICANT CHANGE UP (ref 0–0.2)
BASOPHILS NFR BLD AUTO: 0.2 % — SIGNIFICANT CHANGE UP (ref 0–1)
BILIRUB SERPL-MCNC: 0.3 MG/DL — SIGNIFICANT CHANGE UP (ref 0.2–1.2)
BUN SERPL-MCNC: 25 MG/DL — HIGH (ref 10–20)
CALCIUM SERPL-MCNC: 9.2 MG/DL — SIGNIFICANT CHANGE UP (ref 8.5–10.1)
CHLORIDE SERPL-SCNC: 102 MMOL/L — SIGNIFICANT CHANGE UP (ref 98–110)
CO2 SERPL-SCNC: 25 MMOL/L — SIGNIFICANT CHANGE UP (ref 17–32)
CREAT SERPL-MCNC: 0.8 MG/DL — SIGNIFICANT CHANGE UP (ref 0.7–1.5)
EOSINOPHIL # BLD AUTO: 0.05 K/UL — SIGNIFICANT CHANGE UP (ref 0–0.7)
EOSINOPHIL NFR BLD AUTO: 0.4 % — SIGNIFICANT CHANGE UP (ref 0–8)
GLUCOSE BLDC GLUCOMTR-MCNC: 131 MG/DL — HIGH (ref 70–99)
GLUCOSE BLDC GLUCOMTR-MCNC: 136 MG/DL — HIGH (ref 70–99)
GLUCOSE BLDC GLUCOMTR-MCNC: 152 MG/DL — HIGH (ref 70–99)
GLUCOSE BLDC GLUCOMTR-MCNC: 244 MG/DL — HIGH (ref 70–99)
GLUCOSE SERPL-MCNC: 125 MG/DL — HIGH (ref 70–99)
HCT VFR BLD CALC: 37.7 % — SIGNIFICANT CHANGE UP (ref 37–47)
HGB BLD-MCNC: 11.6 G/DL — LOW (ref 12–16)
IMM GRANULOCYTES NFR BLD AUTO: 1.6 % — HIGH (ref 0.1–0.3)
LYMPHOCYTES # BLD AUTO: 1.08 K/UL — LOW (ref 1.2–3.4)
LYMPHOCYTES # BLD AUTO: 9.2 % — LOW (ref 20.5–51.1)
MAGNESIUM SERPL-MCNC: 2.2 MG/DL — SIGNIFICANT CHANGE UP (ref 1.8–2.4)
MCHC RBC-ENTMCNC: 28.6 PG — SIGNIFICANT CHANGE UP (ref 27–31)
MCHC RBC-ENTMCNC: 30.8 G/DL — LOW (ref 32–37)
MCV RBC AUTO: 93.1 FL — SIGNIFICANT CHANGE UP (ref 81–99)
MONOCYTES # BLD AUTO: 0.41 K/UL — SIGNIFICANT CHANGE UP (ref 0.1–0.6)
MONOCYTES NFR BLD AUTO: 3.5 % — SIGNIFICANT CHANGE UP (ref 1.7–9.3)
NEUTROPHILS # BLD AUTO: 9.95 K/UL — HIGH (ref 1.4–6.5)
NEUTROPHILS NFR BLD AUTO: 85.1 % — HIGH (ref 42.2–75.2)
NRBC # BLD: 0 /100 WBCS — SIGNIFICANT CHANGE UP (ref 0–0)
PLATELET # BLD AUTO: 387 K/UL — SIGNIFICANT CHANGE UP (ref 130–400)
POTASSIUM SERPL-MCNC: 4.4 MMOL/L — SIGNIFICANT CHANGE UP (ref 3.5–5)
POTASSIUM SERPL-SCNC: 4.4 MMOL/L — SIGNIFICANT CHANGE UP (ref 3.5–5)
PROT SERPL-MCNC: 6.6 G/DL — SIGNIFICANT CHANGE UP (ref 6–8)
RBC # BLD: 4.05 M/UL — LOW (ref 4.2–5.4)
RBC # FLD: 13.2 % — SIGNIFICANT CHANGE UP (ref 11.5–14.5)
SODIUM SERPL-SCNC: 140 MMOL/L — SIGNIFICANT CHANGE UP (ref 135–146)
WBC # BLD: 11.7 K/UL — HIGH (ref 4.8–10.8)
WBC # FLD AUTO: 11.7 K/UL — HIGH (ref 4.8–10.8)

## 2021-03-22 PROCEDURE — 99233 SBSQ HOSP IP/OBS HIGH 50: CPT

## 2021-03-22 RX ADMIN — CHLORHEXIDINE GLUCONATE 1 APPLICATION(S): 213 SOLUTION TOPICAL at 11:10

## 2021-03-22 RX ADMIN — Medication 25 MILLIGRAM(S): at 05:16

## 2021-03-22 RX ADMIN — Medication 2: at 12:17

## 2021-03-22 RX ADMIN — ENOXAPARIN SODIUM 100 MILLIGRAM(S): 100 INJECTION SUBCUTANEOUS at 17:44

## 2021-03-22 RX ADMIN — Medication 25 MILLIGRAM(S): at 17:44

## 2021-03-22 RX ADMIN — INSULIN GLARGINE 20 UNIT(S): 100 INJECTION, SOLUTION SUBCUTANEOUS at 21:01

## 2021-03-22 RX ADMIN — Medication 3 UNIT(S): at 12:17

## 2021-03-22 RX ADMIN — ENOXAPARIN SODIUM 100 MILLIGRAM(S): 100 INJECTION SUBCUTANEOUS at 05:18

## 2021-03-22 RX ADMIN — Medication 3 UNIT(S): at 08:00

## 2021-03-22 RX ADMIN — PANTOPRAZOLE SODIUM 40 MILLIGRAM(S): 20 TABLET, DELAYED RELEASE ORAL at 05:17

## 2021-03-22 RX ADMIN — Medication 3 UNIT(S): at 17:00

## 2021-03-22 RX ADMIN — Medication 6 MILLIGRAM(S): at 05:16

## 2021-03-22 NOTE — PROGRESS NOTE ADULT - ASSESSMENT
Patient is a 56-year-old, female patient, PMHx: Hypertension, presented to the ED for Shortness of breath and found to have COVID19 PNA    #Acute Hypoxic Respiratory Failure  #COVID 19 PNA  Currently on % saturating %, pt does not tolerate HFNC  s/p RDV therapy  Ddimers elevated to 33K on 03/16, LE duplex negative  s/p Levaquin x7d (03/12-03/19)  - Cont decadron 6mg qD (Start 03/10)  - Lovenox therapeutic 100mg BID  - Trend inflammatory markers q48-72h  - Taper down oxygen requirements as tolerated - possibly wean to NC or venti    #HTN  #Sinus tachycardia  - Metoprolol tartrate 25 BID  Hypertension with sinus tachy- started on lopressor 25mg po twice daily tx.    #DM2, new onset  Hba1c 6.9 (03/12)  - Lantus/lispro 20-3-3-3  - ISS  - Once downgraded, endocrine consult for outpt tx recommendations    #Progress Note Handoff  Pending (specify): Tapering off NRB and HFNC  Family discussion: d/w pt regarding tapering down O2 requirements  Disposition: Requiring HFNC, TBD

## 2021-03-22 NOTE — PROGRESS NOTE ADULT - SUBJECTIVE AND OBJECTIVE BOX
PAULA ELMORE  56y, Female  Allergy: Allergy Status Unknown    Hospital Day: 12d    Patient seen and examined earlier today. No acute events overnight.    PMH/PSH:  PAST MEDICAL & SURGICAL HISTORY:    VITALS:  T(F): 98.7 (03-22-21 @ 07:13), Max: 98.7 (03-22-21 @ 07:13)  HR: 69 (03-22-21 @ 07:13)  BP: 123/58 (03-22-21 @ 07:13) (122/62 - 144/72)  RR: 21 (03-22-21 @ 07:13)  SpO2: 99% (03-22-21 @ 07:13)    TESTS & MEASUREMENTS:  Weight (Kg):     03-20-21 @ 07:01  -  03-21-21 @ 07:00  --------------------------------------------------------  IN: 1440 mL / OUT: 1200 mL / NET: 240 mL    03-21-21 @ 07:01  -  03-22-21 @ 07:00  --------------------------------------------------------  IN: 240 mL / OUT: 900 mL / NET: -660 mL                        11.6   11.70 )-----------( 387      ( 22 Mar 2021 07:17 )             37.7     03-22    140  |  102  |  25<H>  ----------------------------<  125<H>  4.4   |  25  |  0.8    Ca    9.2      22 Mar 2021 07:17  Mg     2.2     03-22    TPro  6.6  /  Alb  3.1<L>  /  TBili  0.3  /  DBili  x   /  AST  38  /  ALT  47<H>  /  AlkPhos  85  03-22    LIVER FUNCTIONS - ( 22 Mar 2021 07:17 )  Alb: 3.1 g/dL / Pro: 6.6 g/dL / ALK PHOS: 85 U/L / ALT: 47 U/L / AST: 38 U/L / GGT: x           MEDICATIONS:  MEDICATIONS  (STANDING):  acetaminophen   Tablet .. 650 milliGRAM(s) Oral once  chlorhexidine 4% Liquid 1 Application(s) Topical daily  dexAMETHasone  Injectable 6 milliGRAM(s) IV Push daily  enoxaparin Injectable 100 milliGRAM(s) SubCutaneous two times a day  insulin glargine Injectable (LANTUS) 20 Unit(s) SubCutaneous at bedtime  insulin lispro (ADMELOG) corrective regimen sliding scale   SubCutaneous three times a day before meals  insulin lispro Injectable (ADMELOG) 3 Unit(s) SubCutaneous three times a day before meals  metoprolol tartrate 25 milliGRAM(s) Oral two times a day  pantoprazole    Tablet 40 milliGRAM(s) Oral before breakfast    MEDICATIONS  (PRN):  acetaminophen   Tablet .. 650 milliGRAM(s) Oral every 6 hours PRN Temp greater or equal to 38C (100.4F), Mild Pain (1 - 3)  guaifenesin/dextromethorphan  Syrup 10 milliLiter(s) Oral every 6 hours PRN Cough      HOME MEDICATIONS:      REVIEW OF SYSTEMS:  All other review of systems is negative unless indicated above.     PHYSICAL EXAM:  GENERAL: NAD  HEENT: No Swelling  CHEST/LUNG: Good air entry, No wheezing  HEART: RRR, No murmurs  ABDOMEN: Soft, Bowel sounds present  EXTREMITIES:  No clubbing

## 2021-03-23 LAB
ANION GAP SERPL CALC-SCNC: 10 MMOL/L — SIGNIFICANT CHANGE UP (ref 7–14)
BASOPHILS # BLD AUTO: 0.03 K/UL — SIGNIFICANT CHANGE UP (ref 0–0.2)
BASOPHILS NFR BLD AUTO: 0.3 % — SIGNIFICANT CHANGE UP (ref 0–1)
BUN SERPL-MCNC: 27 MG/DL — HIGH (ref 10–20)
CALCIUM SERPL-MCNC: 9.2 MG/DL — SIGNIFICANT CHANGE UP (ref 8.5–10.1)
CHLORIDE SERPL-SCNC: 102 MMOL/L — SIGNIFICANT CHANGE UP (ref 98–110)
CO2 SERPL-SCNC: 27 MMOL/L — SIGNIFICANT CHANGE UP (ref 17–32)
CREAT SERPL-MCNC: 0.8 MG/DL — SIGNIFICANT CHANGE UP (ref 0.7–1.5)
EOSINOPHIL # BLD AUTO: 0.06 K/UL — SIGNIFICANT CHANGE UP (ref 0–0.7)
EOSINOPHIL NFR BLD AUTO: 0.6 % — SIGNIFICANT CHANGE UP (ref 0–8)
GLUCOSE BLDC GLUCOMTR-MCNC: 135 MG/DL — HIGH (ref 70–99)
GLUCOSE BLDC GLUCOMTR-MCNC: 142 MG/DL — HIGH (ref 70–99)
GLUCOSE BLDC GLUCOMTR-MCNC: 157 MG/DL — HIGH (ref 70–99)
GLUCOSE BLDC GLUCOMTR-MCNC: 278 MG/DL — HIGH (ref 70–99)
GLUCOSE SERPL-MCNC: 123 MG/DL — HIGH (ref 70–99)
HCT VFR BLD CALC: 39.8 % — SIGNIFICANT CHANGE UP (ref 37–47)
HGB BLD-MCNC: 12.4 G/DL — SIGNIFICANT CHANGE UP (ref 12–16)
IMM GRANULOCYTES NFR BLD AUTO: 1.6 % — HIGH (ref 0.1–0.3)
LYMPHOCYTES # BLD AUTO: 1.66 K/UL — SIGNIFICANT CHANGE UP (ref 1.2–3.4)
LYMPHOCYTES # BLD AUTO: 16.9 % — LOW (ref 20.5–51.1)
MCHC RBC-ENTMCNC: 29.2 PG — SIGNIFICANT CHANGE UP (ref 27–31)
MCHC RBC-ENTMCNC: 31.2 G/DL — LOW (ref 32–37)
MCV RBC AUTO: 93.9 FL — SIGNIFICANT CHANGE UP (ref 81–99)
MONOCYTES # BLD AUTO: 0.53 K/UL — SIGNIFICANT CHANGE UP (ref 0.1–0.6)
MONOCYTES NFR BLD AUTO: 5.4 % — SIGNIFICANT CHANGE UP (ref 1.7–9.3)
NEUTROPHILS # BLD AUTO: 7.38 K/UL — HIGH (ref 1.4–6.5)
NEUTROPHILS NFR BLD AUTO: 75.2 % — SIGNIFICANT CHANGE UP (ref 42.2–75.2)
NRBC # BLD: 0 /100 WBCS — SIGNIFICANT CHANGE UP (ref 0–0)
PLATELET # BLD AUTO: 396 K/UL — SIGNIFICANT CHANGE UP (ref 130–400)
POTASSIUM SERPL-MCNC: 4.4 MMOL/L — SIGNIFICANT CHANGE UP (ref 3.5–5)
POTASSIUM SERPL-SCNC: 4.4 MMOL/L — SIGNIFICANT CHANGE UP (ref 3.5–5)
RBC # BLD: 4.24 M/UL — SIGNIFICANT CHANGE UP (ref 4.2–5.4)
RBC # FLD: 13.3 % — SIGNIFICANT CHANGE UP (ref 11.5–14.5)
SODIUM SERPL-SCNC: 139 MMOL/L — SIGNIFICANT CHANGE UP (ref 135–146)
WBC # BLD: 9.82 K/UL — SIGNIFICANT CHANGE UP (ref 4.8–10.8)
WBC # FLD AUTO: 9.82 K/UL — SIGNIFICANT CHANGE UP (ref 4.8–10.8)

## 2021-03-23 PROCEDURE — 93970 EXTREMITY STUDY: CPT | Mod: 26

## 2021-03-23 PROCEDURE — 71045 X-RAY EXAM CHEST 1 VIEW: CPT | Mod: 26

## 2021-03-23 PROCEDURE — 99233 SBSQ HOSP IP/OBS HIGH 50: CPT

## 2021-03-23 PROCEDURE — 99232 SBSQ HOSP IP/OBS MODERATE 35: CPT

## 2021-03-23 RX ADMIN — ENOXAPARIN SODIUM 100 MILLIGRAM(S): 100 INJECTION SUBCUTANEOUS at 17:00

## 2021-03-23 RX ADMIN — Medication 6 MILLIGRAM(S): at 06:19

## 2021-03-23 RX ADMIN — ENOXAPARIN SODIUM 100 MILLIGRAM(S): 100 INJECTION SUBCUTANEOUS at 06:19

## 2021-03-23 RX ADMIN — Medication 3 UNIT(S): at 16:06

## 2021-03-23 RX ADMIN — Medication 3 UNIT(S): at 11:48

## 2021-03-23 RX ADMIN — PANTOPRAZOLE SODIUM 40 MILLIGRAM(S): 20 TABLET, DELAYED RELEASE ORAL at 07:50

## 2021-03-23 RX ADMIN — Medication 25 MILLIGRAM(S): at 17:00

## 2021-03-23 RX ADMIN — Medication 25 MILLIGRAM(S): at 06:19

## 2021-03-23 RX ADMIN — Medication 3: at 11:47

## 2021-03-23 RX ADMIN — Medication 3 UNIT(S): at 07:49

## 2021-03-23 RX ADMIN — INSULIN GLARGINE 20 UNIT(S): 100 INJECTION, SOLUTION SUBCUTANEOUS at 21:40

## 2021-03-23 NOTE — CHART NOTE - NSCHARTNOTEFT_GEN_A_CORE
Registered Dietitian Follow-Up     Patient Profile Reviewed                           Yes [x]   No []     Nutrition History Previously Obtained        Yes [x]  No []      Pertinent Medical Interventions: AHRF 2/2 to SARS-CoV-2 PNA.     Diet order: DASH/TLC + Consistent Carbohydrate diet evening snack + Glucerna q8hrs     Anthropometrics:  - Ht. 172.7cm  - Wt. 99.6kg (3/11/21) - no new wt at this time  - BMI 33.4  - IBW 63.6kg      Pertinent Lab Data: 3/23: [id number], gluc-123     Pertinent Meds: enoxaparin, insulin glargine, insulin lispro, metoprolol, dexamethasone, protonix     Physical Findings:  - Appearance: 2+ edema (B/L leg)  - GI function: no new BM documented at this time; LIP notified  - Tubes: no feeding tubes  - Oral/Mouth cavity: no chewing/swallowing difficulty reported  - Skin: intact     Nutrition Requirements  Weight Used: 63.6kg IBW d/t BMI >30 per 3/20 RD assessment     Energy: 8186-1216 kcal/day (25-30 kcal/kg IBW).   Protein: 76-89 g/day (1.2-1.4 g/kg IBW). **needs adjusted to be more appropriate to pt ABW & considering COVID w. critical illness**   Fluids: 1 mL/kcal or per LIP     Nutrient Intake: Consuming 50-75% of meals at this time + po supplement. No new preferences reported. Fair appetite reported.     [x] Previous Nutrition Diagnosis: Inadequate Oral Intake (resolved) - appears to be meeting estimated nutrient needs via consumption of meal trays + po supplement. No new intervention at this time.     Nutrition Intervention: Meals & Snacks     Goal/Expected Outcome: Pt to maintain at least 75% po intake over next 7 days.     Indicator/Monitoring: diet order, energy intake, nutrition related labs, body composition, NFPF.    Recommendation: Continue current nutrition regimen as tolerated.

## 2021-03-23 NOTE — PROGRESS NOTE ADULT - SUBJECTIVE AND OBJECTIVE BOX
Patient is a 56y old  Female who presents with a chief complaint of Shortness of breath (22 Mar 2021 09:59)        Over Night Events:  Resting in bed.  on NRBM>          ROS:     All ROS are negative except HPI         PHYSICAL EXAM    ICU Vital Signs Last 24 Hrs  T(C): 36.7 (23 Mar 2021 00:04), Max: 37.1 (22 Mar 2021 07:13)  T(F): 98 (23 Mar 2021 00:04), Max: 98.7 (22 Mar 2021 07:13)  HR: 79 (23 Mar 2021 06:17) (69 - 91)  BP: 148/66 (23 Mar 2021 06:17) (106/53 - 148/66)  BP(mean): 77 (22 Mar 2021 12:00) (77 - 77)  ABP: --  ABP(mean): --  RR: 18 (23 Mar 2021 06:17) (18 - 22)  SpO2: 98% (23 Mar 2021 06:17) (97% - 99%)      CONSTITUTIONAL:  Well nourished.  NAD    ENT:   Airway patent,   Mouth with normal mucosa.   No thrush    EYES:   Pupils equal,   Round and reactive to light.    CARDIAC:   Normal rate,   Regular rhythm.    No edema      Vascular:  Normal systolic impulse  No Carotid bruits    RESPIRATORY:   No wheezing  Bilateral crackles   Normal chest expansion  Not tachypneic,  No use of accessory muscles    GASTROINTESTINAL:  Abdomen soft,   Non-tender,   No guarding,   + BS    MUSCULOSKELETAL:   Range of motion is not limited,  No clubbing, cyanosis    NEUROLOGICAL:   Alert and oriented   No motor  deficits.    SKIN:   Skin normal color for race,   Warm and dry and intact.   No evidence of rash.    PSYCHIATRIC:   Normal mood and affect.   No apparent risk to self or others.    HEMATOLOGICAL:  No cervical  lymphadenopathy.  no inguinal lymphadenopathy      03-21-21 @ 07:01  -  03-22-21 @ 07:00  --------------------------------------------------------  IN:    Oral Fluid: 240 mL  Total IN: 240 mL    OUT:    Voided (mL): 900 mL  Total OUT: 900 mL    Total NET: -660 mL      03-22-21 @ 07:01  -  03-23-21 @ 06:26  --------------------------------------------------------  IN:    Oral Fluid: 600 mL  Total IN: 600 mL    OUT:    Voided (mL): 500 mL  Total OUT: 500 mL    Total NET: 100 mL          LABS:                            11.6   11.70 )-----------( 387      ( 22 Mar 2021 07:17 )             37.7                                               03-22    140  |  102  |  25<H>  ----------------------------<  125<H>  4.4   |  25  |  0.8    Ca    9.2      22 Mar 2021 07:17  Mg     2.2     03-22    TPro  6.6  /  Alb  3.1<L>  /  TBili  0.3  /  DBili  x   /  AST  38  /  ALT  47<H>  /  AlkPhos  85  03-22                                                                                           LIVER FUNCTIONS - ( 22 Mar 2021 07:17 )  Alb: 3.1 g/dL / Pro: 6.6 g/dL / ALK PHOS: 85 U/L / ALT: 47 U/L / AST: 38 U/L / GGT: x                                                                                                                                       MEDICATIONS  (STANDING):  acetaminophen   Tablet .. 650 milliGRAM(s) Oral once  chlorhexidine 4% Liquid 1 Application(s) Topical daily  dexAMETHasone  Injectable 6 milliGRAM(s) IV Push daily  enoxaparin Injectable 100 milliGRAM(s) SubCutaneous two times a day  insulin glargine Injectable (LANTUS) 20 Unit(s) SubCutaneous at bedtime  insulin lispro (ADMELOG) corrective regimen sliding scale   SubCutaneous three times a day before meals  insulin lispro Injectable (ADMELOG) 3 Unit(s) SubCutaneous three times a day before meals  metoprolol tartrate 25 milliGRAM(s) Oral two times a day  pantoprazole    Tablet 40 milliGRAM(s) Oral before breakfast    MEDICATIONS  (PRN):  acetaminophen   Tablet .. 650 milliGRAM(s) Oral every 6 hours PRN Temp greater or equal to 38C (100.4F), Mild Pain (1 - 3)  guaifenesin/dextromethorphan  Syrup 10 milliLiter(s) Oral every 6 hours PRN Cough      New X-rays reviewed:                                                                                  ECHO    CXR interpreted by me:

## 2021-03-23 NOTE — PROGRESS NOTE ADULT - ASSESSMENT
IMPRESSION:    Acute hypoxemic resp failure on HHFNC 70%  Severe covid pneumonia SP TOCI and PLasma   Probable bacterial superinfection treated       PLAN:    CNS: Avoid CNS depressant    HEENT:  Oral care    PULMONARY:  HOB @ 45 degrees.  Wean O2 as tolerated.  Alexandra LINARES# 13.  Repeat CXR     CARDIOVASCULAR: keep equal balance.      GI: GI prophylaxis                                          Feeding po    RENAL:  F/u  lytes.  Correct as needed. accurate I/O    INFECTIOUS DISEASE: Monitor off ABX    HEMATOLOGICAL:  DVT prophylaxis.  Repeat LE doppler    ENDOCRINE:  Follow up FS.  Insulin protocol if needed.    CODE STATUS: FULL CODE    DISPOSITION: SDU    Prognosis guarded

## 2021-03-23 NOTE — PROGRESS NOTE ADULT - ASSESSMENT
Patient is a 56-year-old, female patient, PMHx: Hypertension, presented to the ED for Shortness of breath and found to have COVID19 PNA    #Acute Hypoxic Respiratory Failure  #COVID 19 PNA  Currently on % saturating %, pt does not tolerate HFNC  s/p RDV therapy  Ddimers elevated to 33K on 03/16, LE duplex negative  s/p Levaquin x7d (03/12-03/19)  - Cont decadron 6mg qD (Start 03/10)  - Lovenox therapeutic 100mg BID  - Trend inflammatory markers q48-72h  - Taper down oxygen requirements as tolerated - possibly wean to NC or venti  - f/u repeat CXR    #HTN  #Sinus tachycardia  - Metoprolol tartrate 25 BID  Hypertension with sinus tachy- started on lopressor 25mg po twice daily tx.    #DM2, new onset  Hba1c 6.9 (03/12)  - Lantus/lispro 20-3-3-3  - ISS  - Once downgraded, endocrine consult for outpt tx recommendations    #Progress Note Handoff  Pending (specify): Tapering off NRB and HFNC  Family discussion: d/w pt regarding tapering down O2 requirements  Disposition: Requiring HFNC, TBD

## 2021-03-23 NOTE — PROGRESS NOTE ADULT - SUBJECTIVE AND OBJECTIVE BOX
HPI:  56-year-old, female patient, PMHx: Hypertension, presented to the ED for Shortness of breath. Jacqueline goes back to 2 days prior to presentation when the patient started having dyspnea. New-onset, aggravated by exertion, no alleviating factors. No orthopnea. The patient had an associated cough, productive of whitish sputum. She denies any fever ( but was febrile in the ED). No sore throat, no anosmia no loss of taste. No skin contact, no recent travel.     Currently admitted to medicine with the primary diagnosis of Acute respiratory failure with hypoxia. Today is hospital day 13.     INTERVAL HPI / OVERNIGHT EVENTS:  Patient was examined and seen at bedside. This morning she is resting comfortably in bed and reports no new issues or overnight events.     Updates:  - 98% on 15L NRB --> will attempt Venti Mask today  - LE Duplex 3/23 neg for DVT  - CXR 3/23: unchanged billateral opacities; f/u final read  - c/w Lovenox tx BID & Dex 6 mg daily (D13)    ROS: Otherwise unremarkable     PAST MEDICAL & SURGICAL HISTORY    ALLERGIES  Allergy Status Unknown    MEDICATIONS  STANDING MEDICATIONS  acetaminophen   Tablet .. 650 milliGRAM(s) Oral once  chlorhexidine 4% Liquid 1 Application(s) Topical daily  dexAMETHasone  Injectable 6 milliGRAM(s) IV Push daily  enoxaparin Injectable 100 milliGRAM(s) SubCutaneous two times a day  insulin glargine Injectable (LANTUS) 20 Unit(s) SubCutaneous at bedtime  insulin lispro (ADMELOG) corrective regimen sliding scale   SubCutaneous three times a day before meals  insulin lispro Injectable (ADMELOG) 3 Unit(s) SubCutaneous three times a day before meals  metoprolol tartrate 25 milliGRAM(s) Oral two times a day  pantoprazole    Tablet 40 milliGRAM(s) Oral before breakfast    PRN MEDICATIONS  acetaminophen   Tablet .. 650 milliGRAM(s) Oral every 6 hours PRN  guaifenesin/dextromethorphan  Syrup 10 milliLiter(s) Oral every 6 hours PRN    VITALS:  T(F): 97.9  HR: 74  BP: 113/55  RR: 18  SpO2: 96%    PHYSICAL EXAM  GEN: NAD, Resting comfortably in bed  PULM: Clear to auscultation bilaterally, No wheezes or access. muscle use  CVS: Regular rate and rhythm, S1-S2, no murmurs  ABD: Soft, non-tender, non-distended, no guarding  NEURO: A&Ox3, no focal deficits    LABS                        12.4   9.82  )-----------( 396      ( 23 Mar 2021 07:17 )             39.8     03-23    139  |  102  |  27<H>  ----------------------------<  123<H>  4.4   |  27  |  0.8    Ca    9.2      23 Mar 2021 07:17  Mg     2.2     03-22    TPro  6.6  /  Alb  3.1<L>  /  TBili  0.3  /  DBili  x   /  AST  38  /  ALT  47<H>  /  AlkPhos  85  03-22      RADIOLOGY    LE Duplex 3/23  No evidence of deep venous thrombosis or superficial thrombophlebitis in the bilateral lower extremities.

## 2021-03-23 NOTE — PROGRESS NOTE ADULT - ASSESSMENT
56-year-old, female patient, PMHx: Hypertension, presented to the ED for Shortness of breath. Jacqueline goes back to 2 days prior to presentation when the patient started having dyspnea. New-onset, aggravated by exertion, no alleviating factors. No orthopnea. The patient had an associated cough, productive of whitish sputum. She denies any fever ( but was febrile in the ED). No sore throat, no anosmia no loss of taste. No skin contact, no recent travel.     # AHRF 2/2 to SARS-CoV-2 Pneumonia  - Currently on NRB @15 LPM sat'ing 98%  - s/p RDV x5 days  - s/p Convalescent Plasma  - s/p Rocephin 5-day course   - s/p levaquin x7 days  - COVID-19 antibodies negative   - c/w Dexamethasone 6mg IV qd  - s/p Toci 400mg x1 3/18-->repeat ferritin sent (3/20 never resulted)   Ferritin--1148>[id number]>1036  D-dimer--248>254>5287>04984>35466>5701>6609  Procal--31.9>21.6>14.8>4.08>0.52>0.16  CRP--214>220>148>84>139>182    # Elevated D-dimer, downtrending  - Trend shown above  - LE duplex on 3/12, negative for DVT  - LE Duplex on 3/23, negative for DVT  - c/w therapeutic Lovenox    # Hypertension  - BP 110s-140s/60s to 60s   - Pt does not know home regimen  - c/w Norvasc 5mg qd  - c/w Lopressor 25mg BID    # Elevated Troponin --resolved  - Troponin: 0.02 at admission then .03, .04, now .02  - No Active Chest pain  - EKG: Sinus Tachycardia    # Transaminitis:   - AST:77 ALT:31, down trending since admission    ---------------------------------------------------------------------------------------  - DVT prophylaxis: Lovenox 100mg BID  - GI prophy: Pantoprazole 40mg QD  - Diet: DASH/TLC  - Dispo: Acute

## 2021-03-23 NOTE — PROGRESS NOTE ADULT - SUBJECTIVE AND OBJECTIVE BOX
PAULA ELMORE  56y, Female  Allergy: Allergy Status Unknown    Hospital Day: 13d    Patient seen and examined earlier today. No acute events overnight.    PMH/PSH:  PAST MEDICAL & SURGICAL HISTORY:    VITALS:  T(F): 97.9 (03-23-21 @ 08:21), Max: 98.2 (03-22-21 @ 12:00)  HR: 74 (03-23-21 @ 08:21)  BP: 113/55 (03-23-21 @ 08:21) (106/53 - 148/66)  RR: 18 (03-23-21 @ 08:21)  SpO2: 96% (03-23-21 @ 08:21)    TESTS & MEASUREMENTS:  Weight (Kg):     03-21-21 @ 07:01  -  03-22-21 @ 07:00  --------------------------------------------------------  IN: 240 mL / OUT: 900 mL / NET: -660 mL    03-22-21 @ 07:01  -  03-23-21 @ 07:00  --------------------------------------------------------  IN: 600 mL / OUT: 500 mL / NET: 100 mL                        12.4   9.82  )-----------( 396      ( 23 Mar 2021 07:17 )             39.8     03-23    139  |  102  |  27<H>  ----------------------------<  123<H>  4.4   |  27  |  0.8    Ca    9.2      23 Mar 2021 07:17  Mg     2.2     03-22    TPro  6.6  /  Alb  3.1<L>  /  TBili  0.3  /  DBili  x   /  AST  38  /  ALT  47<H>  /  AlkPhos  85  03-22    LIVER FUNCTIONS - ( 22 Mar 2021 07:17 )  Alb: 3.1 g/dL / Pro: 6.6 g/dL / ALK PHOS: 85 U/L / ALT: 47 U/L / AST: 38 U/L / GGT: x           RECENT DIAGNOSTIC ORDERS:  VA Duplex Lower Ext Vein Scan, Bilat: 08:19  Exam Completed (03-23-21 @ 08:28)  Xray Chest 1 View-PORTABLE IMMEDIATE: IMMEDIATE   Indication: sob  Transport: Portable (03-23-21 @ 07:44)  Basic Metabolic Panel: AM Sched. Collection: 29-Mar-2021 04:30 (03-22-21 @ 16:27)  Basic Metabolic Panel: AM Sched. Collection: 28-Mar-2021 04:30 (03-22-21 @ 16:27)  Basic Metabolic Panel: AM Sched. Collection: 27-Mar-2021 04:30 (03-22-21 @ 16:27)  Basic Metabolic Panel: AM Sched. Collection: 26-Mar-2021 04:30 (03-22-21 @ 16:27)  Basic Metabolic Panel: AM Sched. Collection: 25-Mar-2021 04:30 (03-22-21 @ 16:27)  Basic Metabolic Panel: AM Sched. Collection: 24-Mar-2021 04:30 (03-22-21 @ 16:27)  Complete Blood Count + Automated Diff: AM Sched. Collection: 29-Mar-2021 04:30 (03-22-21 @ 16:27)  Complete Blood Count + Automated Diff: AM Sched. Collection: 28-Mar-2021 04:30 (03-22-21 @ 16:27)  Complete Blood Count + Automated Diff: AM Sched. Collection: 27-Mar-2021 04:30 (03-22-21 @ 16:27)  Complete Blood Count + Automated Diff: AM Sched. Collection: 26-Mar-2021 04:30 (03-22-21 @ 16:27)  Complete Blood Count + Automated Diff: AM Sched. Collection: 25-Mar-2021 04:30 (03-22-21 @ 16:27)  Complete Blood Count + Automated Diff: AM Sched. Collection: 24-Mar-2021 04:30 (03-22-21 @ 16:27)    MEDICATIONS:  MEDICATIONS  (STANDING):  acetaminophen   Tablet .. 650 milliGRAM(s) Oral once  chlorhexidine 4% Liquid 1 Application(s) Topical daily  dexAMETHasone  Injectable 6 milliGRAM(s) IV Push daily  enoxaparin Injectable 100 milliGRAM(s) SubCutaneous two times a day  insulin glargine Injectable (LANTUS) 20 Unit(s) SubCutaneous at bedtime  insulin lispro (ADMELOG) corrective regimen sliding scale   SubCutaneous three times a day before meals  insulin lispro Injectable (ADMELOG) 3 Unit(s) SubCutaneous three times a day before meals  metoprolol tartrate 25 milliGRAM(s) Oral two times a day  pantoprazole    Tablet 40 milliGRAM(s) Oral before breakfast    MEDICATIONS  (PRN):  acetaminophen   Tablet .. 650 milliGRAM(s) Oral every 6 hours PRN Temp greater or equal to 38C (100.4F), Mild Pain (1 - 3)  guaifenesin/dextromethorphan  Syrup 10 milliLiter(s) Oral every 6 hours PRN Cough      HOME MEDICATIONS:      REVIEW OF SYSTEMS:  All other review of systems is negative unless indicated above.     PHYSICAL EXAM:  GENERAL: NAD  HEENT: No Swelling  CHEST/LUNG: Good air entry, No wheezing  HEART: RRR, No murmurs  ABDOMEN: Soft, Bowel sounds present  EXTREMITIES:  No clubbing

## 2021-03-24 LAB
ANION GAP SERPL CALC-SCNC: 9 MMOL/L — SIGNIFICANT CHANGE UP (ref 7–14)
BASOPHILS # BLD AUTO: 0.02 K/UL — SIGNIFICANT CHANGE UP (ref 0–0.2)
BASOPHILS NFR BLD AUTO: 0.2 % — SIGNIFICANT CHANGE UP (ref 0–1)
BUN SERPL-MCNC: 24 MG/DL — HIGH (ref 10–20)
CALCIUM SERPL-MCNC: 9.6 MG/DL — SIGNIFICANT CHANGE UP (ref 8.5–10.1)
CHLORIDE SERPL-SCNC: 102 MMOL/L — SIGNIFICANT CHANGE UP (ref 98–110)
CO2 SERPL-SCNC: 26 MMOL/L — SIGNIFICANT CHANGE UP (ref 17–32)
CREAT SERPL-MCNC: 0.8 MG/DL — SIGNIFICANT CHANGE UP (ref 0.7–1.5)
D DIMER BLD IA.RAPID-MCNC: 4923 NG/ML DDU — HIGH (ref 0–230)
EOSINOPHIL # BLD AUTO: 0.09 K/UL — SIGNIFICANT CHANGE UP (ref 0–0.7)
EOSINOPHIL NFR BLD AUTO: 0.8 % — SIGNIFICANT CHANGE UP (ref 0–8)
GLUCOSE BLDC GLUCOMTR-MCNC: 106 MG/DL — HIGH (ref 70–99)
GLUCOSE BLDC GLUCOMTR-MCNC: 111 MG/DL — HIGH (ref 70–99)
GLUCOSE BLDC GLUCOMTR-MCNC: 130 MG/DL — HIGH (ref 70–99)
GLUCOSE BLDC GLUCOMTR-MCNC: 215 MG/DL — HIGH (ref 70–99)
GLUCOSE SERPL-MCNC: 128 MG/DL — HIGH (ref 70–99)
HCT VFR BLD CALC: 38.8 % — SIGNIFICANT CHANGE UP (ref 37–47)
HGB BLD-MCNC: 12.1 G/DL — SIGNIFICANT CHANGE UP (ref 12–16)
IMM GRANULOCYTES NFR BLD AUTO: 1.9 % — HIGH (ref 0.1–0.3)
LYMPHOCYTES # BLD AUTO: 0.94 K/UL — LOW (ref 1.2–3.4)
LYMPHOCYTES # BLD AUTO: 8.3 % — LOW (ref 20.5–51.1)
MCHC RBC-ENTMCNC: 28.8 PG — SIGNIFICANT CHANGE UP (ref 27–31)
MCHC RBC-ENTMCNC: 31.2 G/DL — LOW (ref 32–37)
MCV RBC AUTO: 92.4 FL — SIGNIFICANT CHANGE UP (ref 81–99)
MONOCYTES # BLD AUTO: 0.37 K/UL — SIGNIFICANT CHANGE UP (ref 0.1–0.6)
MONOCYTES NFR BLD AUTO: 3.3 % — SIGNIFICANT CHANGE UP (ref 1.7–9.3)
NEUTROPHILS # BLD AUTO: 9.68 K/UL — HIGH (ref 1.4–6.5)
NEUTROPHILS NFR BLD AUTO: 85.5 % — HIGH (ref 42.2–75.2)
NRBC # BLD: 0 /100 WBCS — SIGNIFICANT CHANGE UP (ref 0–0)
PLATELET # BLD AUTO: 355 K/UL — SIGNIFICANT CHANGE UP (ref 130–400)
POTASSIUM SERPL-MCNC: 4.4 MMOL/L — SIGNIFICANT CHANGE UP (ref 3.5–5)
POTASSIUM SERPL-SCNC: 4.4 MMOL/L — SIGNIFICANT CHANGE UP (ref 3.5–5)
RBC # BLD: 4.2 M/UL — SIGNIFICANT CHANGE UP (ref 4.2–5.4)
RBC # FLD: 13.3 % — SIGNIFICANT CHANGE UP (ref 11.5–14.5)
SODIUM SERPL-SCNC: 137 MMOL/L — SIGNIFICANT CHANGE UP (ref 135–146)
WBC # BLD: 11.31 K/UL — HIGH (ref 4.8–10.8)
WBC # FLD AUTO: 11.31 K/UL — HIGH (ref 4.8–10.8)

## 2021-03-24 PROCEDURE — 99233 SBSQ HOSP IP/OBS HIGH 50: CPT

## 2021-03-24 RX ORDER — ENOXAPARIN SODIUM 100 MG/ML
40 INJECTION SUBCUTANEOUS EVERY 12 HOURS
Refills: 0 | Status: DISCONTINUED | OUTPATIENT
Start: 2021-03-24 | End: 2021-04-01

## 2021-03-24 RX ADMIN — Medication 6 MILLIGRAM(S): at 05:44

## 2021-03-24 RX ADMIN — Medication 3 UNIT(S): at 11:45

## 2021-03-24 RX ADMIN — Medication 2: at 11:45

## 2021-03-24 RX ADMIN — Medication 3 UNIT(S): at 16:43

## 2021-03-24 RX ADMIN — ENOXAPARIN SODIUM 100 MILLIGRAM(S): 100 INJECTION SUBCUTANEOUS at 05:44

## 2021-03-24 RX ADMIN — Medication 25 MILLIGRAM(S): at 05:44

## 2021-03-24 RX ADMIN — Medication 25 MILLIGRAM(S): at 17:09

## 2021-03-24 RX ADMIN — ENOXAPARIN SODIUM 40 MILLIGRAM(S): 100 INJECTION SUBCUTANEOUS at 17:09

## 2021-03-24 RX ADMIN — PANTOPRAZOLE SODIUM 40 MILLIGRAM(S): 20 TABLET, DELAYED RELEASE ORAL at 05:46

## 2021-03-24 RX ADMIN — INSULIN GLARGINE 20 UNIT(S): 100 INJECTION, SOLUTION SUBCUTANEOUS at 21:18

## 2021-03-24 RX ADMIN — Medication 3 UNIT(S): at 08:00

## 2021-03-24 NOTE — PHYSICAL THERAPY INITIAL EVALUATION ADULT - IMPAIRMENTS CONTRIBUTING IMPAIRED BED MOBILITY, REHAB EVAL
"""S/P direct brow lift OU. Healed well.  Instructed patient to massage lids with vitamin E.""" impaired balance/decreased flexibility/decreased strength

## 2021-03-24 NOTE — PROGRESS NOTE ADULT - ASSESSMENT
Patient is a 56-year-old, female patient, PMHx: Hypertension, presented to the ED for Shortness of breath and found to have COVID19 PNA    Acute Hypoxemic Respiratory Failure secondary to COVID 19 PNA  currently on NRB, saturating 92%  patient does not tolerate HFNC  s/p RDV, on decadron 6mg daily, started 3/10  s/p 7 day course of levaquin  DDimers trending down to 4900  LE duplex negative 3/23.  decrease lovenox to 40 Q12H  repeat chest xray  taper supplemental o2 as tolerated  out of bed to chair    HTN  Sinus tachycardia  continue with metoprolol 25 Q12H    DM2, new onset  Hba1c 6.9 (03/12)  Lantus/lispro 20-3-3-3 and ISS  monitor FS    #Progress Note Handoff  Pending (specify): Tapering off NRB  Family discussion: d/w pt regarding tapering down O2 requirements  Disposition: home versus east when oxygen requirements decrease

## 2021-03-24 NOTE — PHYSICAL THERAPY INITIAL EVALUATION ADULT - PERTINENT HX OF CURRENT PROBLEM, REHAB EVAL
56-year-old, female patient, PMHx: Hypertension, presented to the ED for Shortness of breath. Histoy goes back to 2 days prior to presentation when the patient started having dyspnea. New-onset, aggravated by exertion, no alleviating factors. No orthopnea. The patient had an associated cough, productive of whitish sputum. She denies any fever ( but was febrile in the ED). No sore throat, no anosmia no loss of taste. No skin contact, no recent travel.

## 2021-03-24 NOTE — PROGRESS NOTE ADULT - SUBJECTIVE AND OBJECTIVE BOX
PAULA ELMORE  56y Female    CHIEF COMPLAINT:    Patient is a 56y old  Female who presents with a chief complaint of Shortness of breath (23 Mar 2021 13:25)      INTERVAL HPI/OVERNIGHT EVENTS:    Patient seen and examined. No acute events overnight. Remains on NRB    ROS: All other systems are negative.    Vital Signs:    T(F): 95.8 (03-24-21 @ 11:15), Max: 98.5 (03-23-21 @ 20:03)  HR: 87 (03-24-21 @ 11:15) (67 - 87)  BP: 121/54 (03-24-21 @ 11:15) (116/57 - 135/62)  RR: 18 (03-24-21 @ 11:15) (18 - 20)  SpO2: 92% (03-24-21 @ 11:15) (92% - 100%)    23 Mar 2021 07:01  -  24 Mar 2021 07:00  --------------------------------------------------------  IN: 0 mL / OUT: 900 mL / NET: -900 mL    24 Mar 2021 07:01  -  24 Mar 2021 13:50  --------------------------------------------------------  IN: 325 mL / OUT: 0 mL / NET: 325 mL    POCT Blood Glucose.: 215 mg/dL (24 Mar 2021 10:50)  POCT Blood Glucose.: 111 mg/dL (24 Mar 2021 07:19)  POCT Blood Glucose.: 157 mg/dL (23 Mar 2021 21:24)  POCT Blood Glucose.: 135 mg/dL (23 Mar 2021 15:59)    PHYSICAL EXAM:    GENERAL:  NAD  SKIN: No rashes or lesions  HEENT: Atraumatic. Normocephalic.   NECK: Supple, No JVD.    PULMONARY: coarse breath sounds. No wheezing. No rales  CVS: Normal S1, S2. Rate and Rhythm are regular   ABDOMEN/GI: Soft, Nontender, Nondistended; BS present  MSK:  No edema B/L LE. No clubbing or cyanosis  NEUROLOGIC: moves all extremities.  PSYCH: Alert & oriented x 3, normal affect    Consultant(s) Notes Reviewed:  [x ] YES  [ ] NO  Care Discussed with Consultants/Other Providers [ x] YES  [ ] NO    LABS:                        12.1   11.31 )-----------( 355      ( 24 Mar 2021 04:30 )             38.8     137  |  102  |  24<H>  ----------------------------<  128<H>  4.4   |  26  |  0.8    Ca    9.6      24 Mar 2021 04:30    RADIOLOGY & ADDITIONAL TESTS    Imaging or report Personally Reviewed:  [x] YES  [ ] NO  EKG reviewed: [x] YES  [ ] NO    Medications:  Standing  acetaminophen   Tablet .. 650 milliGRAM(s) Oral once  chlorhexidine 4% Liquid 1 Application(s) Topical daily  dexAMETHasone  Injectable 6 milliGRAM(s) IV Push daily  enoxaparin Injectable 40 milliGRAM(s) SubCutaneous every 12 hours  insulin glargine Injectable (LANTUS) 20 Unit(s) SubCutaneous at bedtime  insulin lispro (ADMELOG) corrective regimen sliding scale   SubCutaneous three times a day before meals  insulin lispro Injectable (ADMELOG) 3 Unit(s) SubCutaneous three times a day before meals  metoprolol tartrate 25 milliGRAM(s) Oral two times a day  pantoprazole    Tablet 40 milliGRAM(s) Oral before breakfast    PRN Meds  acetaminophen   Tablet .. 650 milliGRAM(s) Oral every 6 hours PRN  guaifenesin/dextromethorphan  Syrup 10 milliLiter(s) Oral every 6 hours PRN

## 2021-03-24 NOTE — PHYSICAL THERAPY INITIAL EVALUATION ADULT - GENERAL OBSERVATIONS, REHAB EVAL
Pt encountered in the bed, +NRB, pulse ox, tele, Primafit, +confusion, agreeable for b/s PT with encouragement. Pt left in the bed post tx all needs within reach DRE [place] made aware.

## 2021-03-25 LAB
ANION GAP SERPL CALC-SCNC: 10 MMOL/L — SIGNIFICANT CHANGE UP (ref 7–14)
BASOPHILS # BLD AUTO: 0.02 K/UL — SIGNIFICANT CHANGE UP (ref 0–0.2)
BASOPHILS NFR BLD AUTO: 0.2 % — SIGNIFICANT CHANGE UP (ref 0–1)
BUN SERPL-MCNC: 24 MG/DL — HIGH (ref 10–20)
CALCIUM SERPL-MCNC: 9.3 MG/DL — SIGNIFICANT CHANGE UP (ref 8.5–10.1)
CHLORIDE SERPL-SCNC: 102 MMOL/L — SIGNIFICANT CHANGE UP (ref 98–110)
CO2 SERPL-SCNC: 27 MMOL/L — SIGNIFICANT CHANGE UP (ref 17–32)
CREAT SERPL-MCNC: 0.7 MG/DL — SIGNIFICANT CHANGE UP (ref 0.7–1.5)
CRP SERPL-MCNC: 4 MG/L — SIGNIFICANT CHANGE UP
EOSINOPHIL # BLD AUTO: 0.03 K/UL — SIGNIFICANT CHANGE UP (ref 0–0.7)
EOSINOPHIL NFR BLD AUTO: 0.3 % — SIGNIFICANT CHANGE UP (ref 0–8)
GLUCOSE BLDC GLUCOMTR-MCNC: 108 MG/DL — HIGH (ref 70–99)
GLUCOSE BLDC GLUCOMTR-MCNC: 115 MG/DL — HIGH (ref 70–99)
GLUCOSE BLDC GLUCOMTR-MCNC: 117 MG/DL — HIGH (ref 70–99)
GLUCOSE BLDC GLUCOMTR-MCNC: 197 MG/DL — HIGH (ref 70–99)
GLUCOSE SERPL-MCNC: 110 MG/DL — HIGH (ref 70–99)
HCT VFR BLD CALC: 37 % — SIGNIFICANT CHANGE UP (ref 37–47)
HGB BLD-MCNC: 11.4 G/DL — LOW (ref 12–16)
IMM GRANULOCYTES NFR BLD AUTO: 1.3 % — HIGH (ref 0.1–0.3)
LYMPHOCYTES # BLD AUTO: 0.81 K/UL — LOW (ref 1.2–3.4)
LYMPHOCYTES # BLD AUTO: 8.7 % — LOW (ref 20.5–51.1)
MCHC RBC-ENTMCNC: 28.6 PG — SIGNIFICANT CHANGE UP (ref 27–31)
MCHC RBC-ENTMCNC: 30.8 G/DL — LOW (ref 32–37)
MCV RBC AUTO: 93 FL — SIGNIFICANT CHANGE UP (ref 81–99)
MONOCYTES # BLD AUTO: 0.37 K/UL — SIGNIFICANT CHANGE UP (ref 0.1–0.6)
MONOCYTES NFR BLD AUTO: 4 % — SIGNIFICANT CHANGE UP (ref 1.7–9.3)
NEUTROPHILS # BLD AUTO: 7.98 K/UL — HIGH (ref 1.4–6.5)
NEUTROPHILS NFR BLD AUTO: 85.5 % — HIGH (ref 42.2–75.2)
NRBC # BLD: 0 /100 WBCS — SIGNIFICANT CHANGE UP (ref 0–0)
PLATELET # BLD AUTO: 341 K/UL — SIGNIFICANT CHANGE UP (ref 130–400)
POTASSIUM SERPL-MCNC: 4.5 MMOL/L — SIGNIFICANT CHANGE UP (ref 3.5–5)
POTASSIUM SERPL-SCNC: 4.5 MMOL/L — SIGNIFICANT CHANGE UP (ref 3.5–5)
PROCALCITONIN SERPL-MCNC: 0.08 NG/ML — SIGNIFICANT CHANGE UP (ref 0.02–0.1)
RBC # BLD: 3.98 M/UL — LOW (ref 4.2–5.4)
RBC # FLD: 13.2 % — SIGNIFICANT CHANGE UP (ref 11.5–14.5)
SODIUM SERPL-SCNC: 139 MMOL/L — SIGNIFICANT CHANGE UP (ref 135–146)
WBC # BLD: 9.33 K/UL — SIGNIFICANT CHANGE UP (ref 4.8–10.8)
WBC # FLD AUTO: 9.33 K/UL — SIGNIFICANT CHANGE UP (ref 4.8–10.8)

## 2021-03-25 PROCEDURE — 99232 SBSQ HOSP IP/OBS MODERATE 35: CPT

## 2021-03-25 PROCEDURE — 99233 SBSQ HOSP IP/OBS HIGH 50: CPT

## 2021-03-25 RX ORDER — FUROSEMIDE 40 MG
40 TABLET ORAL ONCE
Refills: 0 | Status: COMPLETED | OUTPATIENT
Start: 2021-03-25 | End: 2021-03-25

## 2021-03-25 RX ADMIN — Medication 25 MILLIGRAM(S): at 05:07

## 2021-03-25 RX ADMIN — INSULIN GLARGINE 20 UNIT(S): 100 INJECTION, SOLUTION SUBCUTANEOUS at 21:20

## 2021-03-25 RX ADMIN — Medication 3 UNIT(S): at 12:32

## 2021-03-25 RX ADMIN — ENOXAPARIN SODIUM 40 MILLIGRAM(S): 100 INJECTION SUBCUTANEOUS at 05:07

## 2021-03-25 RX ADMIN — PANTOPRAZOLE SODIUM 40 MILLIGRAM(S): 20 TABLET, DELAYED RELEASE ORAL at 05:07

## 2021-03-25 RX ADMIN — Medication 6 MILLIGRAM(S): at 05:07

## 2021-03-25 RX ADMIN — Medication 650 MILLIGRAM(S): at 22:00

## 2021-03-25 RX ADMIN — Medication 3 UNIT(S): at 07:47

## 2021-03-25 RX ADMIN — ENOXAPARIN SODIUM 40 MILLIGRAM(S): 100 INJECTION SUBCUTANEOUS at 17:32

## 2021-03-25 RX ADMIN — Medication 1: at 12:32

## 2021-03-25 RX ADMIN — Medication 25 MILLIGRAM(S): at 17:32

## 2021-03-25 RX ADMIN — Medication 3 UNIT(S): at 17:33

## 2021-03-25 RX ADMIN — Medication 40 MILLIGRAM(S): at 08:40

## 2021-03-25 RX ADMIN — Medication 650 MILLIGRAM(S): at 21:20

## 2021-03-25 NOTE — PROGRESS NOTE ADULT - SUBJECTIVE AND OBJECTIVE BOX
Patient is a 56y old  Female who presents with a chief complaint of Shortness of breath (24 Mar 2021 13:50)        Over Night Events:  On NERBM.  Off pressors         ROS:     All ROS are negative except HPI         PHYSICAL EXAM    ICU Vital Signs Last 24 Hrs  T(C): 36.7 (25 Mar 2021 05:05), Max: 36.8 (24 Mar 2021 20:04)  T(F): 98.1 (25 Mar 2021 05:05), Max: 98.3 (24 Mar 2021 20:04)  HR: 66 (25 Mar 2021 05:05) (61 - 98)  BP: 130/58 (25 Mar 2021 05:05) (121/54 - 145/65)  BP(mean): --  ABP: --  ABP(mean): --  RR: 20 (25 Mar 2021 05:05) (18 - 20)  SpO2: 97% (25 Mar 2021 05:05) (92% - 100%)      CONSTITUTIONAL:  Well nourished.  NAD    ENT:   Airway patent,   Mouth with normal mucosa.   No thrush    EYES:   Pupils equal,   Round and reactive to light.    CARDIAC:   Normal rate,   Regular rhythm.    No edema      Vascular:  Normal systolic impulse  No Carotid bruits    RESPIRATORY:   No wheezing  Bilateral crackles   Normal chest expansion  Not tachypneic,  No use of accessory muscles    GASTROINTESTINAL:  Abdomen soft,   Non-tender,   No guarding,   + BS    MUSCULOSKELETAL:   Range of motion is not limited,  No clubbing, cyanosis    NEUROLOGICAL:   Alert and oriented   No motor  deficits.    SKIN:   Skin normal color for race,   Warm and dry and intact.   No evidence of rash.    PSYCHIATRIC:   Normal mood and affect.   No apparent risk to self or others.    HEMATOLOGICAL:  No cervical  lymphadenopathy.  no inguinal lymphadenopathy      03-23-21 @ 07:01  -  03-24-21 @ 07:00  --------------------------------------------------------  IN:  Total IN: 0 mL    OUT:    Voided (mL): 900 mL  Total OUT: 900 mL    Total NET: -900 mL      03-24-21 @ 07:01  -  03-25-21 @ 06:54  --------------------------------------------------------  IN:    Oral Fluid: 325 mL  Total IN: 325 mL    OUT:  Total OUT: 0 mL    Total NET: 325 mL          LABS:                            12.1   11.31 )-----------( 355      ( 24 Mar 2021 04:30 )             38.8                                               03-24    137  |  102  |  24<H>  ----------------------------<  128<H>  4.4   |  26  |  0.8    Ca    9.6      24 Mar 2021 04:30                                                                                                                                                                                                                           MEDICATIONS  (STANDING):  acetaminophen   Tablet .. 650 milliGRAM(s) Oral once  chlorhexidine 4% Liquid 1 Application(s) Topical daily  dexAMETHasone  Injectable 6 milliGRAM(s) IV Push daily  enoxaparin Injectable 40 milliGRAM(s) SubCutaneous every 12 hours  insulin glargine Injectable (LANTUS) 20 Unit(s) SubCutaneous at bedtime  insulin lispro (ADMELOG) corrective regimen sliding scale   SubCutaneous three times a day before meals  insulin lispro Injectable (ADMELOG) 3 Unit(s) SubCutaneous three times a day before meals  metoprolol tartrate 25 milliGRAM(s) Oral two times a day  pantoprazole    Tablet 40 milliGRAM(s) Oral before breakfast    MEDICATIONS  (PRN):  acetaminophen   Tablet .. 650 milliGRAM(s) Oral every 6 hours PRN Temp greater or equal to 38C (100.4F), Mild Pain (1 - 3)  guaifenesin/dextromethorphan  Syrup 10 milliLiter(s) Oral every 6 hours PRN Cough      New X-rays reviewed:                                                                                  ECHO    CXR interpreted by me:

## 2021-03-25 NOTE — PROGRESS NOTE ADULT - ASSESSMENT
56-year-old, female patient, PMHx: Hypertension, presented to the ED for Shortness of breath. Jacqueline goes back to 2 days prior to presentation when the patient started having dyspnea. New-onset, aggravated by exertion, no alleviating factors. No orthopnea. The patient had an associated cough, productive of whitish sputum. She denies any fever ( but was febrile in the ED). No sore throat, no anosmia no loss of taste. No skin contact, no recent travel.     # AHRF 2/2 to SARS-CoV-2 Pneumonia  - Currently on NRB @10 LPM sat'ing 94-98%  - s/p RDV x5 days  - s/p Convalescent Plasma  - s/p Rocephin 5-day course   - s/p levaquin x7 days  - COVID-19 antibodies negative   - c/w Dexamethasone 6mg IV qd  - s/p Toci 400mg x1 3/18-->repeat ferritin sent (3/20 never resulted)   Ferritin--1148>1173>1365>1094>1036  D-dimer--248>254>5287>95169>56262>5701>6609  Procal--31.9>21.6>14.8>4.08>0.52>0.16  CRP--214>220>148>84>139>182    # Elevated D-dimer, downtrending  - Trend shown above  - LE duplex on 3/12, negative for DVT  - LE Duplex on 3/23, negative for DVT  - c/w therapeutic Lovenox    # Hypertension  - BP 110s-140s/60s to 60s   - Pt does not know home regimen  - c/w Norvasc 5mg qd  - c/w Lopressor 25mg BID    # Elevated Troponin --resolved  - Troponin: 0.02 at admission then .03, .04, now .02  - No Active Chest pain  - EKG: Sinus Tachycardia    # Transaminitis:   - AST:77 ALT:31, down trending since admission    ---------------------------------------------------------------------------------------  - DVT prophylaxis: Lovenox 100mg BID  - GI prophy: Pantoprazole 40mg QD  - Diet: DASH/TLC  - Dispo: Acute

## 2021-03-25 NOTE — PROGRESS NOTE ADULT - SUBJECTIVE AND OBJECTIVE BOX
PAULA ELMORE  56y Female    CHIEF COMPLAINT:    Patient is a 56y old  Female who presents with a chief complaint of Shortness of breath (25 Mar 2021 06:54)      INTERVAL HPI/OVERNIGHT EVENTS:    Patient seen and examined. No acute events overnight. remains on NRB    ROS: All other systems are negative.    Vital Signs:    T(F): 98.1 (03-25-21 @ 05:05), Max: 98.3 (03-24-21 @ 20:04)  HR: 70 (03-25-21 @ 10:13) (61 - 98)  BP: 115/54 (03-25-21 @ 10:13) (115/54 - 145/65)  RR: 20 (03-25-21 @ 10:13) (20 - 22)  SpO2: 96% (03-25-21 @ 10:13) (96% - 99%)  I&O's Summary    24 Mar 2021 07:01  -  25 Mar 2021 07:00  --------------------------------------------------------  IN: 325 mL / OUT: 0 mL / NET: 325 mL    POCT Blood Glucose.: 197 mg/dL (25 Mar 2021 11:43)  POCT Blood Glucose.: 115 mg/dL (25 Mar 2021 07:28)  POCT Blood Glucose.: 106 mg/dL (24 Mar 2021 21:15)  POCT Blood Glucose.: 130 mg/dL (24 Mar 2021 16:36)    PHYSICAL EXAM:    GENERAL:  NAD  SKIN: No rashes or lesions  HEENT: Atraumatic. Normocephalic.  NECK: Supple, No JVD.   PULMONARY: poor inspiratory effort. No wheezing. No rales  CVS: Normal S1, S2. Rate and Rhythm are regular.    ABDOMEN/GI: Soft, Nontender, Nondistended; BS present  MSK:  No edema B/L LE. No clubbing or cyanosis  NEUROLOGIC:  no clubbing or cyanosis  PSYCH: Alert & oriented x 3, normal affect    Consultant(s) Notes Reviewed:  [x ] YES  [ ] NO  Care Discussed with Consultants/Other Providers [ x] YES  [ ] NO    LABS:                        11.4   9.33  )-----------( 341      ( 25 Mar 2021 07:36 )             37.0    139  |  102  |  24<H>  ----------------------------<  110<H>  4.5   |  27  |  0.7    Ca    9.3      25 Mar 2021 07:36    RADIOLOGY & ADDITIONAL TESTS:  Imaging or report Personally Reviewed:  [x] YES  [ ] NO  EKG reviewed: [x] YES  [ ] NO    Medications:  Standing  acetaminophen   Tablet .. 650 milliGRAM(s) Oral once  chlorhexidine 4% Liquid 1 Application(s) Topical daily  dexAMETHasone  Injectable 6 milliGRAM(s) IV Push daily  enoxaparin Injectable 40 milliGRAM(s) SubCutaneous every 12 hours  insulin glargine Injectable (LANTUS) 20 Unit(s) SubCutaneous at bedtime  insulin lispro (ADMELOG) corrective regimen sliding scale   SubCutaneous three times a day before meals  insulin lispro Injectable (ADMELOG) 3 Unit(s) SubCutaneous three times a day before meals  metoprolol tartrate 25 milliGRAM(s) Oral two times a day  pantoprazole    Tablet 40 milliGRAM(s) Oral before breakfast    PRN Meds  acetaminophen   Tablet .. 650 milliGRAM(s) Oral every 6 hours PRN  guaifenesin/dextromethorphan  Syrup 10 milliLiter(s) Oral every 6 hours PRN

## 2021-03-25 NOTE — PROGRESS NOTE ADULT - SUBJECTIVE AND OBJECTIVE BOX
HPI:  56-year-old, female patient, PMHx: Hypertension, presented to the ED for Shortness of breath. Jacqueline goes back to 2 days prior to presentation when the patient started having dyspnea. New-onset, aggravated by exertion, no alleviating factors. No orthopnea. The patient had an associated cough, productive of whitish sputum. She denies any fever ( but was febrile in the ED). No sore throat, no anosmia no loss of taste. No skin contact, no recent travel.     Currently admitted to medicine with the primary diagnosis of Acute respiratory failure with hypoxia. Today is hospital day 13.     INTERVAL HPI / OVERNIGHT EVENTS:  Patient was examined and seen at bedside. This morning she is resting comfortably in bed and reports no new issues or overnight events.     Updates:  - 98% on 10 L NRB, not tolerating Venti mask yet  - LE Duplex 3/23 neg for DVT  - CXR 3/23: Bilateral opacifications without change.  - c/w Lovenox tx BID & Dex 6 mg daily (D14)    ROS: Otherwise unremarkable     PAST MEDICAL & SURGICAL HISTORY    ALLERGIES  Allergy Status Unknown    MEDICATIONS  STANDING MEDICATIONS  acetaminophen   Tablet .. 650 milliGRAM(s) Oral once  chlorhexidine 4% Liquid 1 Application(s) Topical daily  dexAMETHasone  Injectable 6 milliGRAM(s) IV Push daily  enoxaparin Injectable 40 milliGRAM(s) SubCutaneous every 12 hours  insulin glargine Injectable (LANTUS) 20 Unit(s) SubCutaneous at bedtime  insulin lispro (ADMELOG) corrective regimen sliding scale   SubCutaneous three times a day before meals  insulin lispro Injectable (ADMELOG) 3 Unit(s) SubCutaneous three times a day before meals  metoprolol tartrate 25 milliGRAM(s) Oral two times a day  pantoprazole    Tablet 40 milliGRAM(s) Oral before breakfast    PRN MEDICATIONS  acetaminophen   Tablet .. 650 milliGRAM(s) Oral every 6 hours PRN  guaifenesin/dextromethorphan  Syrup 10 milliLiter(s) Oral every 6 hours PRN    VITALS:  T(F): 98.1  HR: 79  BP: 132/61  RR: 20  SpO2: 96%    PHYSICAL EXAM  GEN: NAD, Resting comfortably in bed  PULM: Clear to auscultation bilaterally, No wheezes or access. muscle use  CVS: Regular rate and rhythm, S1-S2, no murmurs  ABD: Soft, non-tender, non-distended, no guarding  NEURO: A&Ox3, no focal deficits    LABS                        11.4   9.33  )-----------( 341      ( 25 Mar 2021 07:36 )             37.0     03-25    139  |  102  |  24<H>  ----------------------------<  110<H>  4.5   |  27  |  0.7    Ca    9.3      25 Mar 2021 07:36        RADIOLOGY    LE Duplex 3/23  No evidence of deep venous thrombosis or superficial thrombophlebitis in the bilateral lower extremities.

## 2021-03-25 NOTE — PROGRESS NOTE ADULT - ASSESSMENT
IMPRESSION:    Acute hypoxemic resp failure  Severe covid pneumonia SP TOCI and PLasma   Probable bacterial superinfection treated       PLAN:    CNS: Avoid CNS depressant    HEENT:  Oral care    PULMONARY:  HOB @ 45 degrees.  Wean O2 as tolerated.  Dexa D# 15.     CARDIOVASCULAR: keep equal to negative balance.  One dose LAsix 40 mg today       GI: GI prophylaxis                                          Feeding po    RENAL:  F/u  lytes.  Correct as needed. accurate I/O    INFECTIOUS DISEASE: Monitor off ABX    HEMATOLOGICAL:  DVT prophylaxis.  Repeat LE doppler negative     ENDOCRINE:  Follow up FS.  Insulin protocol if needed.    CODE STATUS: FULL CODE    DISPOSITION: SDU    Prognosis guarded

## 2021-03-25 NOTE — PROGRESS NOTE ADULT - ASSESSMENT
Patient is a 56-year-old, female patient, PMHx: Hypertension, presented to the ED for Shortness of breath and found to have COVID19 PNA    Acute Hypoxemic Respiratory Failure secondary to COVID 19 PNA  currently on NRB, saturating 96%  patient does not tolerate HFNC  s/p RDV, on decadron 6mg daily, started 3/10  s/p 7 day course of levaquin  DDimers trending down to 4900  LE duplex negative 3/23.  On lovenox to 40 Q12H  repeat chest xray  Lasix 40 x1  taper supplemental o2 as tolerated  out of bed to chair    HTN  Sinus tachycardia  continue with metoprolol 25 Q12H    DM2, new onset  Hba1c 6.9 (03/12)  Lantus/lispro 20-3-3-3 and ISS  monitor FS    #Progress Note Handoff  Pending (specify): Tapering off NRB  Family discussion: d/w pt regarding tapering down O2 requirements  Disposition: [place] versus [place] when oxygen requirements decrease

## 2021-03-26 LAB
ANION GAP SERPL CALC-SCNC: 15 MMOL/L — HIGH (ref 7–14)
BASOPHILS # BLD AUTO: 0.02 K/UL — SIGNIFICANT CHANGE UP (ref 0–0.2)
BASOPHILS NFR BLD AUTO: 0.2 % — SIGNIFICANT CHANGE UP (ref 0–1)
BUN SERPL-MCNC: 28 MG/DL — HIGH (ref 10–20)
CALCIUM SERPL-MCNC: 9.7 MG/DL — SIGNIFICANT CHANGE UP (ref 8.5–10.1)
CHLORIDE SERPL-SCNC: 98 MMOL/L — SIGNIFICANT CHANGE UP (ref 98–110)
CO2 SERPL-SCNC: 26 MMOL/L — SIGNIFICANT CHANGE UP (ref 17–32)
CREAT SERPL-MCNC: 0.7 MG/DL — SIGNIFICANT CHANGE UP (ref 0.7–1.5)
EOSINOPHIL # BLD AUTO: 0.03 K/UL — SIGNIFICANT CHANGE UP (ref 0–0.7)
EOSINOPHIL NFR BLD AUTO: 0.3 % — SIGNIFICANT CHANGE UP (ref 0–8)
GLUCOSE BLDC GLUCOMTR-MCNC: 100 MG/DL — HIGH (ref 70–99)
GLUCOSE BLDC GLUCOMTR-MCNC: 138 MG/DL — HIGH (ref 70–99)
GLUCOSE BLDC GLUCOMTR-MCNC: 146 MG/DL — HIGH (ref 70–99)
GLUCOSE BLDC GLUCOMTR-MCNC: 160 MG/DL — HIGH (ref 70–99)
GLUCOSE SERPL-MCNC: 151 MG/DL — HIGH (ref 70–99)
HCT VFR BLD CALC: 43 % — SIGNIFICANT CHANGE UP (ref 37–47)
HGB BLD-MCNC: 13.4 G/DL — SIGNIFICANT CHANGE UP (ref 12–16)
IMM GRANULOCYTES NFR BLD AUTO: 1.3 % — HIGH (ref 0.1–0.3)
LYMPHOCYTES # BLD AUTO: 0.8 K/UL — LOW (ref 1.2–3.4)
LYMPHOCYTES # BLD AUTO: 8.9 % — LOW (ref 20.5–51.1)
MCHC RBC-ENTMCNC: 29.1 PG — SIGNIFICANT CHANGE UP (ref 27–31)
MCHC RBC-ENTMCNC: 31.2 G/DL — LOW (ref 32–37)
MCV RBC AUTO: 93.3 FL — SIGNIFICANT CHANGE UP (ref 81–99)
MONOCYTES # BLD AUTO: 0.2 K/UL — SIGNIFICANT CHANGE UP (ref 0.1–0.6)
MONOCYTES NFR BLD AUTO: 2.2 % — SIGNIFICANT CHANGE UP (ref 1.7–9.3)
NEUTROPHILS # BLD AUTO: 7.82 K/UL — HIGH (ref 1.4–6.5)
NEUTROPHILS NFR BLD AUTO: 87.1 % — HIGH (ref 42.2–75.2)
NRBC # BLD: 0 /100 WBCS — SIGNIFICANT CHANGE UP (ref 0–0)
PLATELET # BLD AUTO: 275 K/UL — SIGNIFICANT CHANGE UP (ref 130–400)
POTASSIUM SERPL-MCNC: 4.3 MMOL/L — SIGNIFICANT CHANGE UP (ref 3.5–5)
POTASSIUM SERPL-SCNC: 4.3 MMOL/L — SIGNIFICANT CHANGE UP (ref 3.5–5)
RBC # BLD: 4.61 M/UL — SIGNIFICANT CHANGE UP (ref 4.2–5.4)
RBC # FLD: 13.2 % — SIGNIFICANT CHANGE UP (ref 11.5–14.5)
SODIUM SERPL-SCNC: 139 MMOL/L — SIGNIFICANT CHANGE UP (ref 135–146)
WBC # BLD: 8.99 K/UL — SIGNIFICANT CHANGE UP (ref 4.8–10.8)
WBC # FLD AUTO: 8.99 K/UL — SIGNIFICANT CHANGE UP (ref 4.8–10.8)

## 2021-03-26 PROCEDURE — 71045 X-RAY EXAM CHEST 1 VIEW: CPT | Mod: 26

## 2021-03-26 PROCEDURE — 99233 SBSQ HOSP IP/OBS HIGH 50: CPT

## 2021-03-26 RX ADMIN — PANTOPRAZOLE SODIUM 40 MILLIGRAM(S): 20 TABLET, DELAYED RELEASE ORAL at 05:23

## 2021-03-26 RX ADMIN — CHLORHEXIDINE GLUCONATE 1 APPLICATION(S): 213 SOLUTION TOPICAL at 12:02

## 2021-03-26 RX ADMIN — ENOXAPARIN SODIUM 40 MILLIGRAM(S): 100 INJECTION SUBCUTANEOUS at 17:10

## 2021-03-26 RX ADMIN — Medication 6 MILLIGRAM(S): at 05:23

## 2021-03-26 RX ADMIN — ENOXAPARIN SODIUM 40 MILLIGRAM(S): 100 INJECTION SUBCUTANEOUS at 05:23

## 2021-03-26 RX ADMIN — Medication 3 UNIT(S): at 12:01

## 2021-03-26 RX ADMIN — INSULIN GLARGINE 20 UNIT(S): 100 INJECTION, SOLUTION SUBCUTANEOUS at 22:07

## 2021-03-26 RX ADMIN — Medication 25 MILLIGRAM(S): at 05:23

## 2021-03-26 RX ADMIN — Medication 3 UNIT(S): at 08:59

## 2021-03-26 RX ADMIN — Medication 25 MILLIGRAM(S): at 17:10

## 2021-03-26 RX ADMIN — Medication 1: at 12:01

## 2021-03-26 NOTE — PROGRESS NOTE ADULT - SUBJECTIVE AND OBJECTIVE BOX
HPI:  56-year-old, female patient, PMHx: Hypertension, presented to the ED for Shortness of breath. Jacqueline goes back to 2 days prior to presentation when the patient started having dyspnea. New-onset, aggravated by exertion, no alleviating factors. No orthopnea. The patient had an associated cough, productive of whitish sputum. She denies any fever ( but was febrile in the ED). No sore throat, no anosmia no loss of taste. No skin contact, no recent travel.     Currently admitted to medicine with the primary diagnosis of Acute respiratory failure with hypoxia. Today is hospital day 13.     INTERVAL HPI / OVERNIGHT EVENTS:  Patient was examined and seen at bedside. This morning she is resting comfortably in bed and reports no new issues or overnight events.     Updates:  - 98% on 10 L NRB, not tolerating Venti mask yet  - LE Duplex 3/23 neg for DVT  - CXR 3/23: Bilateral opacifications without change.  - c/w Lovenox tx BID & Dex 6 mg daily (D15)    ROS: Otherwise unremarkable     PAST MEDICAL & SURGICAL HISTORY    ALLERGIES  Allergy Status Unknown    MEDICATIONS  STANDING MEDICATIONS  acetaminophen   Tablet .. 650 milliGRAM(s) Oral once  chlorhexidine 4% Liquid 1 Application(s) Topical daily  dexAMETHasone  Injectable 6 milliGRAM(s) IV Push daily  enoxaparin Injectable 40 milliGRAM(s) SubCutaneous every 12 hours  insulin glargine Injectable (LANTUS) 20 Unit(s) SubCutaneous at bedtime  insulin lispro (ADMELOG) corrective regimen sliding scale   SubCutaneous three times a day before meals  insulin lispro Injectable (ADMELOG) 3 Unit(s) SubCutaneous three times a day before meals  metoprolol tartrate 25 milliGRAM(s) Oral two times a day  pantoprazole    Tablet 40 milliGRAM(s) Oral before breakfast    PRN MEDICATIONS  acetaminophen   Tablet .. 650 milliGRAM(s) Oral every 6 hours PRN  guaifenesin/dextromethorphan  Syrup 10 milliLiter(s) Oral every 6 hours PRN    VITALS:  T(F): 98  HR: 69  BP: 119/57  RR: 18  SpO2: 98%    PHYSICAL EXAM  GEN: NAD, Resting comfortably in bed  PULM: Clear to auscultation bilaterally, No wheezes  CVS: Regular rate and rhythm, S1-S2, no murmurs  ABD: Soft, non-tender, non-distended, no guarding  EXT: No edema  NEURO: A&Ox3, no focal deficits    LABS                        13.4   8.99  )-----------( 275      ( 26 Mar 2021 08:51 )             43.0     03-26    139  |  98  |  28<H>  ----------------------------<  151<H>  4.3   |  26  |  0.7    Ca    9.7      26 Mar 2021 08:51        RADIOLOGY    LE Duplex 3/23  No evidence of deep venous thrombosis or superficial thrombophlebitis in the bilateral lower extremities.

## 2021-03-26 NOTE — PROGRESS NOTE ADULT - SUBJECTIVE AND OBJECTIVE BOX
PAULA ELMORE  56y Female    CHIEF COMPLAINT:    Patient is a 56y old  Female who presents with a chief complaint of Shortness of breath (25 Mar 2021 14:30)      INTERVAL HPI/OVERNIGHT EVENTS:    Patient seen and examined. No acute events overnight. Remains on NRB    ROS: All other systems are negative.    Vital Signs:    T(F): 98.1 (03-26-21 @ 08:47), Max: 98.2 (03-25-21 @ 20:13)  HR: 67 (03-26-21 @ 09:00) (66 - 83)  BP: 127/60 (03-26-21 @ 08:47) (120/58 - 132/61)  RR: 19 (03-26-21 @ 09:00) (19 - 20)  SpO2: 98% (03-26-21 @ 09:00) (94% - 99%)    25 Mar 2021 07:01  -  26 Mar 2021 07:00  --------------------------------------------------------  IN: 0 mL / OUT: 1000 mL / NET: -1000 mL    26 Mar 2021 07:01  -  26 Mar 2021 13:45  --------------------------------------------------------  IN: 300 mL / OUT: 0 mL / NET: 300 mL    POCT Blood Glucose.: 160 mg/dL (26 Mar 2021 11:53)  POCT Blood Glucose.: 138 mg/dL (26 Mar 2021 08:25)  POCT Blood Glucose.: 108 mg/dL (25 Mar 2021 21:01)  POCT Blood Glucose.: 117 mg/dL (25 Mar 2021 16:05)    PHYSICAL EXAM:    GENERAL:  NAD  SKIN: No rashes or lesions  HEENT: Atraumatic. Normocephalic.  NECK: Supple, No JVD.  PULMONARY: CTA B/L. No wheezing. No rales  CVS: Normal S1, S2. Rate and Rhythm are regular.   ABDOMEN/GI: Soft, Nontender, Nondistended; BS present  MSK:  No edema B/L LE. No clubbing or cyanosis  NEUROLOGIC: moves all extemities  PSYCH: Alert & oriented x 3, normal affect    Consultant(s) Notes Reviewed:  [x ] YES  [ ] NO  Care Discussed with Consultants/Other Providers [ x] YES  [ ] NO    LABS:                        13.4   8.99  )-----------( 275      ( 26 Mar 2021 08:51 )             43.0     139  |  98  |  28<H>  ----------------------------<  151<H>  4.3   |  26  |  0.7    Ca    9.7      26 Mar 2021 08:51    RADIOLOGY & ADDITIONAL TESTS:  < from: Xray Chest 1 View- PORTABLE-Routine (Xray Chest 1 View- PORTABLE-Routine in AM.) (03.26.21 @ 06:22) >  Impression:    Increasing bilateral opacities. No pneumothorax    < end of copied text >    Imaging or report Personally Reviewed:  [x] YES  [ ] NO  EKG reviewed: [x] YES  [ ] NO    Medications:  Standing  acetaminophen   Tablet .. 650 milliGRAM(s) Oral once  chlorhexidine 4% Liquid 1 Application(s) Topical daily  dexAMETHasone  Injectable 6 milliGRAM(s) IV Push daily  enoxaparin Injectable 40 milliGRAM(s) SubCutaneous every 12 hours  insulin glargine Injectable (LANTUS) 20 Unit(s) SubCutaneous at bedtime  insulin lispro (ADMELOG) corrective regimen sliding scale   SubCutaneous three times a day before meals  insulin lispro Injectable (ADMELOG) 3 Unit(s) SubCutaneous three times a day before meals  metoprolol tartrate 25 milliGRAM(s) Oral two times a day  pantoprazole    Tablet 40 milliGRAM(s) Oral before breakfast    PRN Meds  acetaminophen   Tablet .. 650 milliGRAM(s) Oral every 6 hours PRN  guaifenesin/dextromethorphan  Syrup 10 milliLiter(s) Oral every 6 hours PRN

## 2021-03-26 NOTE — PROGRESS NOTE ADULT - ASSESSMENT
56-year-old, female patient, PMHx: Hypertension, presented to the ED for Shortness of breath. Jacqueline goes back to 2 days prior to presentation when the patient started having dyspnea. New-onset, aggravated by exertion, no alleviating factors. No orthopnea. The patient had an associated cough, productive of whitish sputum. She denies any fever ( but was febrile in the ED). No sore throat, no anosmia no loss of taste. No skin contact, no recent travel.     # AHRF 2/2 to SARS-CoV-2 Pneumonia  - Currently on NRB @10-12 LPM sat'ing 98%  - OOB to chair  - f/u PT eval  - s/p RDV x5 days  - s/p Convalescent Plasma  - s/p Rocephin 5-day course   - s/p levaquin x7 days  - COVID-19 antibodies negative   - c/w Dexamethasone 6mg IV qd  - s/p Toci 400mg x1 3/18-->repeat ferritin sent (3/20 never resulted)   Ferritin--1148>1173>1365>1094>1036  D-dimer--248>254>5287>70714>35221>5701>6609  Procal--31.9>21.6>14.8>4.08>0.52>0.16  CRP--214>220>148>84>139>182    # Elevated D-dimer, downtrending  - Trend shown above  - LE duplex on 3/12, negative for DVT  - LE Duplex on 3/23, negative for DVT  - c/w therapeutic Lovenox    # Hypertension  - BP 110s-140s/60s to 60s   - Pt does not know home regimen  - c/w Norvasc 5mg qd  - c/w Lopressor 25mg BID    # Elevated Troponin --resolved  - Troponin: 0.02 at admission then .03, .04, now .02  - No Active Chest pain  - EKG: Sinus Tachycardia    # Transaminitis:   - AST:77 ALT:31, down trending since admission    ---------------------------------------------------------------------------------------  - DVT prophylaxis: Lovenox 100mg BID  - GI prophy: Pantoprazole 40mg QD  - Diet: DASH/TLC  - Dispo: Acute    Family () updated daily

## 2021-03-26 NOTE — PROGRESS NOTE ADULT - ASSESSMENT
Patient is a 56-year-old, female patient, PMHx: Hypertension, presented to the ED for Shortness of breath and found to have COVID19 PNA    Acute Hypoxemic Respiratory Failure secondary to COVID 19 PNA  currently on NRB, saturating 99%  patient does not tolerate HFNC  s/p RDV, on decadron 6mg daily, started 3/10  taper down to 4mg today  s/p 7 day course of levaquin  DDimers trending down to 4900  LE duplex negative 3/23.  On Lovenox to 40 Q12H  repeat chest xray and BNP  taper supplemental o2 as tolerated  out of bed to chair    HTN  Sinus tachycardia  continue with metoprolol 25 Q12H    DM2, new onset  Hba1c 6.9 (03/12)  Lantus/lispro 20-3-3-3 and ISS  monitor FS    #Progress Note Handoff  Pending (specify): Tapering off NRB  Family discussion: d/w pt regarding tapering down O2 requirements  Disposition: home versus east when oxygen requirements decrease

## 2021-03-27 LAB
GLUCOSE BLDC GLUCOMTR-MCNC: 113 MG/DL — HIGH (ref 70–99)
GLUCOSE BLDC GLUCOMTR-MCNC: 113 MG/DL — HIGH (ref 70–99)
GLUCOSE BLDC GLUCOMTR-MCNC: 133 MG/DL — HIGH (ref 70–99)
GLUCOSE BLDC GLUCOMTR-MCNC: 177 MG/DL — HIGH (ref 70–99)

## 2021-03-27 PROCEDURE — 99233 SBSQ HOSP IP/OBS HIGH 50: CPT

## 2021-03-27 RX ORDER — FUROSEMIDE 40 MG
40 TABLET ORAL ONCE
Refills: 0 | Status: COMPLETED | OUTPATIENT
Start: 2021-03-27 | End: 2021-03-27

## 2021-03-27 RX ADMIN — INSULIN GLARGINE 20 UNIT(S): 100 INJECTION, SOLUTION SUBCUTANEOUS at 22:10

## 2021-03-27 RX ADMIN — Medication 6 MILLIGRAM(S): at 05:39

## 2021-03-27 RX ADMIN — ENOXAPARIN SODIUM 40 MILLIGRAM(S): 100 INJECTION SUBCUTANEOUS at 17:00

## 2021-03-27 RX ADMIN — Medication 25 MILLIGRAM(S): at 17:00

## 2021-03-27 RX ADMIN — Medication 40 MILLIGRAM(S): at 15:14

## 2021-03-27 RX ADMIN — Medication 25 MILLIGRAM(S): at 05:39

## 2021-03-27 RX ADMIN — Medication 3 UNIT(S): at 08:26

## 2021-03-27 RX ADMIN — PANTOPRAZOLE SODIUM 40 MILLIGRAM(S): 20 TABLET, DELAYED RELEASE ORAL at 05:39

## 2021-03-27 RX ADMIN — Medication 3 UNIT(S): at 17:05

## 2021-03-27 RX ADMIN — ENOXAPARIN SODIUM 40 MILLIGRAM(S): 100 INJECTION SUBCUTANEOUS at 05:39

## 2021-03-27 RX ADMIN — CHLORHEXIDINE GLUCONATE 1 APPLICATION(S): 213 SOLUTION TOPICAL at 12:17

## 2021-03-27 RX ADMIN — Medication 3 UNIT(S): at 12:16

## 2021-03-27 RX ADMIN — Medication 1: at 12:16

## 2021-03-27 NOTE — PROGRESS NOTE ADULT - SUBJECTIVE AND OBJECTIVE BOX
PAULA ELMORE  56y Female    CHIEF COMPLAINT:    Patient is a 56y old  Female who presents with a chief complaint of Shortness of breath (26 Mar 2021 15:55)      INTERVAL HPI/OVERNIGHT EVENTS:    Patient seen and examined. No acute events overnight. remains on NRB    ROS: All other systems are negative.    Vital Signs:    T(F): 96.5 (03-27-21 @ 08:00), Max: 98.6 (03-26-21 @ 16:00)  HR: 66 (03-27-21 @ 08:00) (61 - 69)  BP: 139/61 (03-27-21 @ 08:00) (106/52 - 142/66)  RR: 20 (03-27-21 @ 08:00) (16 - 20)  SpO2: 92% (03-27-21 @ 08:00) (92% - 98%)    26 Mar 2021 07:01  -  27 Mar 2021 07:00  --------------------------------------------------------  IN: 300 mL / OUT: 0 mL / NET: 300 mL    POCT Blood Glucose.: 177 mg/dL (27 Mar 2021 11:47)  POCT Blood Glucose.: 113 mg/dL (27 Mar 2021 08:19)  POCT Blood Glucose.: 100 mg/dL (26 Mar 2021 21:54)  POCT Blood Glucose.: 146 mg/dL (26 Mar 2021 16:35)    PHYSICAL EXAM:    GENERAL:  NAD  SKIN: No rashes or lesions  HEENT: Atraumatic. Normocephalic.   NECK: Supple, No JVD.   PULMONARY: coarse breath sounds. No wheezing. No rales  CVS: Normal S1, S2. Rate and Rhythm are regular.    ABDOMEN/GI: Soft, Nontender, Nondistended; BS present  MSK: No clubbing or cyanosis  NEUROLOGIC: moves all extremities  PSYCH: Alert & oriented x 3, normal affect    Consultant(s) Notes Reviewed:  [x ] YES  [ ] NO  Care Discussed with Consultants/Other Providers [ x] YES  [ ] NO    LABS:                        13.4   8.99  )-----------( 275      ( 26 Mar 2021 08:51 )             43.0     139  |  98  |  28<H>  ----------------------------<  151<H>  4.3   |  26  |  0.7    Ca    9.7      26 Mar 2021 08:51    RADIOLOGY & ADDITIONAL TESTS:  Imaging or report Personally Reviewed:  [x] YES  [ ] NO  EKG reviewed: [x] YES  [ ] NO    Medications:  Standing  acetaminophen   Tablet .. 650 milliGRAM(s) Oral once  chlorhexidine 4% Liquid 1 Application(s) Topical daily  dexAMETHasone  Injectable 6 milliGRAM(s) IV Push daily  enoxaparin Injectable 40 milliGRAM(s) SubCutaneous every 12 hours  insulin glargine Injectable (LANTUS) 20 Unit(s) SubCutaneous at bedtime  insulin lispro (ADMELOG) corrective regimen sliding scale   SubCutaneous three times a day before meals  insulin lispro Injectable (ADMELOG) 3 Unit(s) SubCutaneous three times a day before meals  metoprolol tartrate 25 milliGRAM(s) Oral two times a day  pantoprazole    Tablet 40 milliGRAM(s) Oral before breakfast    PRN Meds  acetaminophen   Tablet .. 650 milliGRAM(s) Oral every 6 hours PRN  guaifenesin/dextromethorphan  Syrup 10 milliLiter(s) Oral every 6 hours PRN

## 2021-03-27 NOTE — PROGRESS NOTE ADULT - SUBJECTIVE AND OBJECTIVE BOX
HPI:  56-year-old, female patient, PMHx: Hypertension, presented to the ED for Shortness of breath. Jacqueline goes back to 2 days prior to presentation when the patient started having dyspnea. New-onset, aggravated by exertion, no alleviating factors. No orthopnea. The patient had an associated cough, productive of whitish sputum. She denies any fever ( but was febrile in the ED). No sore throat, no anosmia no loss of taste. No skin contact, no recent travel.     Currently admitted to medicine with the primary diagnosis of Acute respiratory failure with hypoxia. Today is hospital day 13.     INTERVAL HPI / OVERNIGHT EVENTS:  Patient was examined and seen at bedside. This morning she is resting comfortably in bed and reports no new issues or overnight events.     Updates:  - 98% on 10 L NRB  - LE Duplex 3/23 neg for DVT  - CXR 3/26: Increasing bilateral opacities. No pneumothorax  - c/w Lovenox tx BID & Dex 6 mg daily (D16)    ROS: Otherwise unremarkable     PAST MEDICAL & SURGICAL HISTORY    ALLERGIES  Allergy Status Unknown    MEDICATIONS  STANDING MEDICATIONS  acetaminophen   Tablet .. 650 milliGRAM(s) Oral once  chlorhexidine 4% Liquid 1 Application(s) Topical daily  dexAMETHasone  Injectable 6 milliGRAM(s) IV Push daily  enoxaparin Injectable 40 milliGRAM(s) SubCutaneous every 12 hours  insulin glargine Injectable (LANTUS) 20 Unit(s) SubCutaneous at bedtime  insulin lispro (ADMELOG) corrective regimen sliding scale   SubCutaneous three times a day before meals  insulin lispro Injectable (ADMELOG) 3 Unit(s) SubCutaneous three times a day before meals  metoprolol tartrate 25 milliGRAM(s) Oral two times a day  pantoprazole    Tablet 40 milliGRAM(s) Oral before breakfast    PRN MEDICATIONS  acetaminophen   Tablet .. 650 milliGRAM(s) Oral every 6 hours PRN  guaifenesin/dextromethorphan  Syrup 10 milliLiter(s) Oral every 6 hours PRN    VITALS:  T(F): 97  HR: 66  BP: 115/66  RR: 20  SpO2: 99%    PHYSICAL EXAM  GEN: NAD, Resting comfortably in bed  PULM: Clear to auscultation bilaterally, No wheezes  CVS: Regular rate and rhythm, S1-S2, no murmurs  ABD: Soft, non-tender, non-distended, no guarding  EXT: No edema  NEURO: A&Ox3, no focal deficits      LABS                        13.4   8.99  )-----------( 275      ( 26 Mar 2021 08:51 )             43.0     03-26    139  |  98  |  28<H>  ----------------------------<  151<H>  4.3   |  26  |  0.7    Ca    9.7      26 Mar 2021 08:51                RADIOLOGY    CXR 3/26  Increasing bilateral opacities    LE Duplex 3/23  No evidence of deep venous thrombosis or superficial thrombophlebitis in the bilateral lower extremities.

## 2021-03-27 NOTE — PROGRESS NOTE ADULT - ASSESSMENT
Patient is a 56-year-old, female patient, PMHx: Hypertension, presented to the ED for Shortness of breath and found to have COVID19 PNA    Acute Hypoxemic Respiratory Failure secondary to COVID 19 PNA  currently on NRB, saturating 95%  patient does not tolerate HFNC despite encouragement  repeat chest xray with increasing b/l opacities  s/p RDV, on decadron 6mg daily, started 3/10  decrease decadron to 4mg today  s/p 7 day course of levaquin  DDimers trending down to 4900, repeat Ddimer  LE duplex negative 3/23.  On Lovenox to 40 Q12H  repeat chest xray, BNP and inflammatory markers  taper supplemental o2 as tolerated  out of bed to chair    HTN  Sinus tachycardia  continue with metoprolol 25 Q12H    DM2, new onset  Hba1c 6.9 (03/12)  Lantus/lispro 20-3-3-3 and ISS  monitor FS    #Progress Note Handoff  Pending (specify): Tapering off NRB  Family discussion: d/w pt regarding tapering down O2 requirements  Disposition: home versus east when oxygen requirements decrease     Patient is a 56-year-old, female patient, PMHx: Hypertension, presented to the ED for Shortness of breath and found to have COVID19 PNA    Acute Hypoxemic Respiratory Failure secondary to COVID 19 PNA  currently on NRB, saturating 95%  patient does not tolerate HFNC despite encouragement  repeat chest xray with increasing b/l opacities---> give Lasix 40 x1  s/p RDV, on decadron 6mg daily, started 3/10  decrease decadron to 4mg today  s/p 7 day course of levaquin  DDimers trending down to 4900, repeat Ddimer  LE duplex negative 3/23.  On Lovenox to 40 Q12H  repeat chest xray, BNP and inflammatory markers  taper supplemental o2 as tolerated  out of bed to chair    HTN  Sinus tachycardia  continue with metoprolol 25 Q12H    DM2, new onset  Hba1c 6.9 (03/12)  Lantus/lispro 20-3-3-3 and ISS  monitor FS    #Progress Note Handoff  Pending (specify): Tapering off NRB  Family discussion: d/w pt regarding tapering down O2 requirements  Disposition: home versus east when oxygen requirements decrease

## 2021-03-27 NOTE — PROGRESS NOTE ADULT - ASSESSMENT
56-year-old, female patient, PMHx: Hypertension, presented to the ED for Shortness of breath. Jacqueline goes back to 2 days prior to presentation when the patient started having dyspnea. New-onset, aggravated by exertion, no alleviating factors. No orthopnea. The patient had an associated cough, productive of whitish sputum. She denies any fever ( but was febrile in the ED). No sore throat, no anosmia no loss of taste. No skin contact, no recent travel.     # AHRF 2/2 to SARS-CoV-2 Pneumonia  - NRB @ 10 LPM sat'ing 99%  - OOB to chair  - PT eval 3/24: rehab on d/c   - s/p RDV x5 days  - s/p Convalescent Plasma  - s/p Rocephin 5-day course   - s/p levaquin x7 days  - COVID-19 antibodies negative   - c/w Dexamethasone 6mg IV qd  - s/p Toci 400mg x1 3/18-->repeat ferritin sent (3/20 never resulted)   Ferritin--[id number]>1094>1036  D-dimer--248>254>[id number]>5701>6609  Procal--31.9>21.6>14.8>4.08>0.52>0.16  CRP--214>220>148>84>139>182    # Elevated D-dimer, downtrending  - Trend shown above  - LE duplex on 3/12, negative for DVT  - LE Duplex on 3/23, negative for DVT  - c/w therapeutic Lovenox    # Hypertension  - BP 110s-140s/60s to 60s   - Pt does not know home regimen  - c/w Norvasc 5mg qd  - c/w Lopressor 25mg BID    # Elevated Troponin --resolved  - Troponin: 0.02 at admission then .03, .04, now .02  - No Active Chest pain  - EKG: Sinus Tachycardia    # Transaminitis:   - AST:77 ALT:31, down trending since admission    ---------------------------------------------------------------------------------------  - DVT prophylaxis: Lovenox 100mg BID  - GI prophy: Pantoprazole 40mg QD  - Diet: DASH/TLC  - Dispo: Acute    Family () updated

## 2021-03-28 LAB
ANION GAP SERPL CALC-SCNC: 12 MMOL/L — SIGNIFICANT CHANGE UP (ref 7–14)
BASOPHILS # BLD AUTO: 0.02 K/UL — SIGNIFICANT CHANGE UP (ref 0–0.2)
BASOPHILS NFR BLD AUTO: 0.2 % — SIGNIFICANT CHANGE UP (ref 0–1)
BUN SERPL-MCNC: 28 MG/DL — HIGH (ref 10–20)
CALCIUM SERPL-MCNC: 10 MG/DL — SIGNIFICANT CHANGE UP (ref 8.5–10.1)
CHLORIDE SERPL-SCNC: 99 MMOL/L — SIGNIFICANT CHANGE UP (ref 98–110)
CO2 SERPL-SCNC: 29 MMOL/L — SIGNIFICANT CHANGE UP (ref 17–32)
CREAT SERPL-MCNC: 0.8 MG/DL — SIGNIFICANT CHANGE UP (ref 0.7–1.5)
D DIMER BLD IA.RAPID-MCNC: 2567 NG/ML DDU — HIGH (ref 0–230)
EOSINOPHIL # BLD AUTO: 0.04 K/UL — SIGNIFICANT CHANGE UP (ref 0–0.7)
EOSINOPHIL NFR BLD AUTO: 0.3 % — SIGNIFICANT CHANGE UP (ref 0–8)
GLUCOSE BLDC GLUCOMTR-MCNC: 102 MG/DL — HIGH (ref 70–99)
GLUCOSE BLDC GLUCOMTR-MCNC: 123 MG/DL — HIGH (ref 70–99)
GLUCOSE BLDC GLUCOMTR-MCNC: 127 MG/DL — HIGH (ref 70–99)
GLUCOSE BLDC GLUCOMTR-MCNC: 173 MG/DL — HIGH (ref 70–99)
GLUCOSE SERPL-MCNC: 135 MG/DL — HIGH (ref 70–99)
HCT VFR BLD CALC: 42.1 % — SIGNIFICANT CHANGE UP (ref 37–47)
HGB BLD-MCNC: 12.8 G/DL — SIGNIFICANT CHANGE UP (ref 12–16)
IMM GRANULOCYTES NFR BLD AUTO: 1.3 % — HIGH (ref 0.1–0.3)
LYMPHOCYTES # BLD AUTO: 1.08 K/UL — LOW (ref 1.2–3.4)
LYMPHOCYTES # BLD AUTO: 9.3 % — LOW (ref 20.5–51.1)
MCHC RBC-ENTMCNC: 28.3 PG — SIGNIFICANT CHANGE UP (ref 27–31)
MCHC RBC-ENTMCNC: 30.4 G/DL — LOW (ref 32–37)
MCV RBC AUTO: 93.1 FL — SIGNIFICANT CHANGE UP (ref 81–99)
MONOCYTES # BLD AUTO: 0.25 K/UL — SIGNIFICANT CHANGE UP (ref 0.1–0.6)
MONOCYTES NFR BLD AUTO: 2.1 % — SIGNIFICANT CHANGE UP (ref 1.7–9.3)
NEUTROPHILS # BLD AUTO: 10.1 K/UL — HIGH (ref 1.4–6.5)
NEUTROPHILS NFR BLD AUTO: 86.8 % — HIGH (ref 42.2–75.2)
NRBC # BLD: 0 /100 WBCS — SIGNIFICANT CHANGE UP (ref 0–0)
NT-PROBNP SERPL-SCNC: 66 PG/ML — SIGNIFICANT CHANGE UP (ref 0–300)
PLATELET # BLD AUTO: 339 K/UL — SIGNIFICANT CHANGE UP (ref 130–400)
POTASSIUM SERPL-MCNC: 4.1 MMOL/L — SIGNIFICANT CHANGE UP (ref 3.5–5)
POTASSIUM SERPL-SCNC: 4.1 MMOL/L — SIGNIFICANT CHANGE UP (ref 3.5–5)
RBC # BLD: 4.52 M/UL — SIGNIFICANT CHANGE UP (ref 4.2–5.4)
RBC # FLD: 13.2 % — SIGNIFICANT CHANGE UP (ref 11.5–14.5)
SODIUM SERPL-SCNC: 140 MMOL/L — SIGNIFICANT CHANGE UP (ref 135–146)
WBC # BLD: 11.64 K/UL — HIGH (ref 4.8–10.8)
WBC # FLD AUTO: 11.64 K/UL — HIGH (ref 4.8–10.8)

## 2021-03-28 PROCEDURE — 71045 X-RAY EXAM CHEST 1 VIEW: CPT | Mod: 26

## 2021-03-28 PROCEDURE — 99232 SBSQ HOSP IP/OBS MODERATE 35: CPT

## 2021-03-28 RX ADMIN — INSULIN GLARGINE 20 UNIT(S): 100 INJECTION, SOLUTION SUBCUTANEOUS at 23:09

## 2021-03-28 RX ADMIN — PANTOPRAZOLE SODIUM 40 MILLIGRAM(S): 20 TABLET, DELAYED RELEASE ORAL at 07:59

## 2021-03-28 RX ADMIN — Medication 3 UNIT(S): at 17:34

## 2021-03-28 RX ADMIN — Medication 25 MILLIGRAM(S): at 05:26

## 2021-03-28 RX ADMIN — Medication 6 MILLIGRAM(S): at 05:26

## 2021-03-28 RX ADMIN — ENOXAPARIN SODIUM 40 MILLIGRAM(S): 100 INJECTION SUBCUTANEOUS at 05:26

## 2021-03-28 RX ADMIN — Medication 25 MILLIGRAM(S): at 17:34

## 2021-03-28 RX ADMIN — Medication 3 UNIT(S): at 11:34

## 2021-03-28 RX ADMIN — Medication 1: at 11:34

## 2021-03-28 RX ADMIN — Medication 3 UNIT(S): at 07:58

## 2021-03-28 RX ADMIN — ENOXAPARIN SODIUM 40 MILLIGRAM(S): 100 INJECTION SUBCUTANEOUS at 17:34

## 2021-03-28 NOTE — PROGRESS NOTE ADULT - SUBJECTIVE AND OBJECTIVE BOX
PAULA ELMORE  56y Female    CHIEF COMPLAINT:    Patient is a 56y old  Female who presents with a chief complaint of Shortness of breath (27 Mar 2021 19:37)      INTERVAL HPI/OVERNIGHT EVENTS:    Patient seen and examined. No acute events overnight. remains on NRB    ROS: All other systems are negative.    Vital Signs:    T(F): 98.7 (03-28-21 @ 10:00), Max: 98.7 (03-28-21 @ 10:00)  HR: 67 (03-28-21 @ 10:00) (66 - 96)  BP: 120/62 (03-28-21 @ 10:00) (115/55 - 127/62)  RR: 20 (03-28-21 @ 10:00) (19 - 20)  SpO2: 97% (03-28-21 @ 08:25) (97% - 100%)    27 Mar 2021 07:01  -  28 Mar 2021 07:00  --------------------------------------------------------  IN: 0 mL / OUT: 1400 mL / NET: -1400 mL    POCT Blood Glucose.: 173 mg/dL (28 Mar 2021 11:27)  POCT Blood Glucose.: 127 mg/dL (28 Mar 2021 07:47)  POCT Blood Glucose.: 113 mg/dL (27 Mar 2021 21:42)  POCT Blood Glucose.: 133 mg/dL (27 Mar 2021 16:56)    PHYSICAL EXAM:    GENERAL:  NAD  SKIN: No rashes or lesions  HEENT: Atraumatic. Normocephalic.   NECK: Supple, No JVD.  PULMONARY: Poor inspiratory effort. No wheezing. No rales  CVS: Normal S1, S2. Rate and Rhythm are regular. No murmurs.  ABDOMEN/GI: Soft, Nontender, Nondistended; BS present  MSK:  No edema B/L LE. No clubbing or cyanosis  NEUROLOGIC: moves all extremities  PSYCH: Alert & oriented x 3, normal affect    Consultant(s) Notes Reviewed:  [x ] YES  [ ] NO  Care Discussed with Consultants/Other Providers [ x] YES  [ ] NO    LABS:                        12.8   11.64 )-----------( 339      ( 28 Mar 2021 08:26 )             42.1     140  |  99  |  28<H>  ----------------------------<  135<H>  4.1   |  29  |  0.8    Ca    10.0      28 Mar 2021 08:26    Serum Pro-Brain Natriuretic Peptide: 66 pg/mL (03-28-21 @ 08:26)    RADIOLOGY & ADDITIONAL TESTS:  Imaging or report Personally Reviewed:  [x] YES  [ ] NO  EKG reviewed: [x] YES  [ ] NO    Medications:  Standing  acetaminophen   Tablet .. 650 milliGRAM(s) Oral once  chlorhexidine 4% Liquid 1 Application(s) Topical daily  dexAMETHasone  Injectable 6 milliGRAM(s) IV Push daily  enoxaparin Injectable 40 milliGRAM(s) SubCutaneous every 12 hours  insulin glargine Injectable (LANTUS) 20 Unit(s) SubCutaneous at bedtime  insulin lispro (ADMELOG) corrective regimen sliding scale   SubCutaneous three times a day before meals  insulin lispro Injectable (ADMELOG) 3 Unit(s) SubCutaneous three times a day before meals  metoprolol tartrate 25 milliGRAM(s) Oral two times a day  pantoprazole    Tablet 40 milliGRAM(s) Oral before breakfast    PRN Meds  acetaminophen   Tablet .. 650 milliGRAM(s) Oral every 6 hours PRN  guaifenesin/dextromethorphan  Syrup 10 milliLiter(s) Oral every 6 hours PRN

## 2021-03-28 NOTE — PROGRESS NOTE ADULT - ASSESSMENT
Patient is a 56-year-old, female patient, PMHx: Hypertension, presented to the ED for Shortness of breath and found to have COVID19 PNA    Acute Hypoxemic Respiratory Failure secondary to COVID 19 PNA  currently on NRB, saturating 97%  patient does not tolerate HFNC despite encouragement  repeat chest xray with increasing b/l opacities--->s/p Lasix iv x2  s/p RDV, on decadron 6mg daily, started 3/10  decrease decadron to 4mg today  s/p 7 day course of levaquin  DDimers trending down to 2500, peaked 6600  LE duplex negative 3/23.  On Lovenox to 40 Q12H  taper supplemental o2 as tolerated  out of bed to chair  repeat AM xray     HTN  Sinus tachycardia  continue with metoprolol 25 Q12H    DM2, new onset  Hba1c 6.9 (03/12)  Lantus/lispro 20-3-3-3 and ISS  monitor FS    #Progress Note Handoff  Pending (specify): Tapering off NRB  Family discussion: d/w pt regarding tapering down O2 requirements  Disposition: home versus east when oxygen requirements decrease

## 2021-03-29 LAB
ANION GAP SERPL CALC-SCNC: 14 MMOL/L — SIGNIFICANT CHANGE UP (ref 7–14)
BASOPHILS # BLD AUTO: 0.04 K/UL — SIGNIFICANT CHANGE UP (ref 0–0.2)
BASOPHILS NFR BLD AUTO: 0.3 % — SIGNIFICANT CHANGE UP (ref 0–1)
BUN SERPL-MCNC: 25 MG/DL — HIGH (ref 10–20)
CALCIUM SERPL-MCNC: 9.9 MG/DL — SIGNIFICANT CHANGE UP (ref 8.5–10.1)
CHLORIDE SERPL-SCNC: 99 MMOL/L — SIGNIFICANT CHANGE UP (ref 98–110)
CO2 SERPL-SCNC: 28 MMOL/L — SIGNIFICANT CHANGE UP (ref 17–32)
CREAT SERPL-MCNC: 0.8 MG/DL — SIGNIFICANT CHANGE UP (ref 0.7–1.5)
EOSINOPHIL # BLD AUTO: 0.11 K/UL — SIGNIFICANT CHANGE UP (ref 0–0.7)
EOSINOPHIL NFR BLD AUTO: 0.9 % — SIGNIFICANT CHANGE UP (ref 0–8)
FERRITIN SERPL-MCNC: 637 NG/ML — HIGH (ref 15–150)
GLUCOSE BLDC GLUCOMTR-MCNC: 116 MG/DL — HIGH (ref 70–99)
GLUCOSE BLDC GLUCOMTR-MCNC: 121 MG/DL — HIGH (ref 70–99)
GLUCOSE BLDC GLUCOMTR-MCNC: 82 MG/DL — SIGNIFICANT CHANGE UP (ref 70–99)
GLUCOSE BLDC GLUCOMTR-MCNC: 95 MG/DL — SIGNIFICANT CHANGE UP (ref 70–99)
GLUCOSE SERPL-MCNC: 119 MG/DL — HIGH (ref 70–99)
HCT VFR BLD CALC: 44.3 % — SIGNIFICANT CHANGE UP (ref 37–47)
HGB BLD-MCNC: 13.5 G/DL — SIGNIFICANT CHANGE UP (ref 12–16)
IMM GRANULOCYTES NFR BLD AUTO: 0.9 % — HIGH (ref 0.1–0.3)
LYMPHOCYTES # BLD AUTO: 2.52 K/UL — SIGNIFICANT CHANGE UP (ref 1.2–3.4)
LYMPHOCYTES # BLD AUTO: 21.3 % — SIGNIFICANT CHANGE UP (ref 20.5–51.1)
MAGNESIUM SERPL-MCNC: 2.2 MG/DL — SIGNIFICANT CHANGE UP (ref 1.8–2.4)
MCHC RBC-ENTMCNC: 28.5 PG — SIGNIFICANT CHANGE UP (ref 27–31)
MCHC RBC-ENTMCNC: 30.5 G/DL — LOW (ref 32–37)
MCV RBC AUTO: 93.5 FL — SIGNIFICANT CHANGE UP (ref 81–99)
MONOCYTES # BLD AUTO: 0.74 K/UL — HIGH (ref 0.1–0.6)
MONOCYTES NFR BLD AUTO: 6.3 % — SIGNIFICANT CHANGE UP (ref 1.7–9.3)
NEUTROPHILS # BLD AUTO: 8.29 K/UL — HIGH (ref 1.4–6.5)
NEUTROPHILS NFR BLD AUTO: 70.3 % — SIGNIFICANT CHANGE UP (ref 42.2–75.2)
NRBC # BLD: 0 /100 WBCS — SIGNIFICANT CHANGE UP (ref 0–0)
PLATELET # BLD AUTO: 311 K/UL — SIGNIFICANT CHANGE UP (ref 130–400)
POTASSIUM SERPL-MCNC: 3.8 MMOL/L — SIGNIFICANT CHANGE UP (ref 3.5–5)
POTASSIUM SERPL-SCNC: 3.8 MMOL/L — SIGNIFICANT CHANGE UP (ref 3.5–5)
PROCALCITONIN SERPL-MCNC: 0.04 NG/ML — SIGNIFICANT CHANGE UP (ref 0.02–0.1)
RBC # BLD: 4.74 M/UL — SIGNIFICANT CHANGE UP (ref 4.2–5.4)
RBC # FLD: 13 % — SIGNIFICANT CHANGE UP (ref 11.5–14.5)
SODIUM SERPL-SCNC: 141 MMOL/L — SIGNIFICANT CHANGE UP (ref 135–146)
WBC # BLD: 11.81 K/UL — HIGH (ref 4.8–10.8)
WBC # FLD AUTO: 11.81 K/UL — HIGH (ref 4.8–10.8)

## 2021-03-29 PROCEDURE — 71275 CT ANGIOGRAPHY CHEST: CPT | Mod: 26

## 2021-03-29 PROCEDURE — 99233 SBSQ HOSP IP/OBS HIGH 50: CPT

## 2021-03-29 RX ADMIN — ENOXAPARIN SODIUM 40 MILLIGRAM(S): 100 INJECTION SUBCUTANEOUS at 18:35

## 2021-03-29 RX ADMIN — Medication 25 MILLIGRAM(S): at 05:10

## 2021-03-29 RX ADMIN — Medication 3 UNIT(S): at 08:10

## 2021-03-29 RX ADMIN — Medication 25 MILLIGRAM(S): at 18:35

## 2021-03-29 RX ADMIN — PANTOPRAZOLE SODIUM 40 MILLIGRAM(S): 20 TABLET, DELAYED RELEASE ORAL at 08:09

## 2021-03-29 RX ADMIN — INSULIN GLARGINE 20 UNIT(S): 100 INJECTION, SOLUTION SUBCUTANEOUS at 21:23

## 2021-03-29 RX ADMIN — Medication 6 MILLIGRAM(S): at 05:11

## 2021-03-29 RX ADMIN — ENOXAPARIN SODIUM 40 MILLIGRAM(S): 100 INJECTION SUBCUTANEOUS at 05:11

## 2021-03-29 RX ADMIN — Medication 3 UNIT(S): at 11:54

## 2021-03-29 NOTE — PROGRESS NOTE ADULT - SUBJECTIVE AND OBJECTIVE BOX
Patient Information:  PAULA SUMO / 56y / Female / MRN#:[id number]    Hospital Day: 19d    Interval History:  Patient seen and examined at bedside. No acute events overnight. Pt remains on NRB, appears NAD and states she feels improved. Desaturates to below 90% at times on NRB.    Past Medical History:    Past Surgical History:    Allergies:  Allergy Status Unknown    Medications:  PRN:  acetaminophen   Tablet .. 650 milliGRAM(s) Oral every 6 hours PRN Temp greater or equal to 38C (100.4F), Mild Pain (1 - 3)  guaifenesin/dextromethorphan  Syrup 10 milliLiter(s) Oral every 6 hours PRN Cough    Standing:  acetaminophen   Tablet .. 650 milliGRAM(s) Oral once  chlorhexidine 4% Liquid 1 Application(s) Topical daily  dexAMETHasone  Injectable 6 milliGRAM(s) IV Push daily  enoxaparin Injectable 40 milliGRAM(s) SubCutaneous every 12 hours  insulin glargine Injectable (LANTUS) 20 Unit(s) SubCutaneous at bedtime  insulin lispro (ADMELOG) corrective regimen sliding scale   SubCutaneous three times a day before meals  insulin lispro Injectable (ADMELOG) 3 Unit(s) SubCutaneous three times a day before meals  metoprolol tartrate 25 milliGRAM(s) Oral two times a day  pantoprazole    Tablet 40 milliGRAM(s) Oral before breakfast    Home:    Vitals:  T(C): 36.7, Max: 37.1 (03-28-21 @ 10:00)  T(F): 98.1, Max: 98.7 (03-28-21 @ 10:00)  HR: 108 (67 - 108)  BP: 130/60 (120/62 - 130/60)  RR: 20 (19 - 20)  SpO2: 94% (94% - 99%)    Physical Exam:  General: Awake, alert, NAD, sitting up in bed on NRB  HEENT: Head NC/AT  Heart: Regular rhythm, inc rate, loud S2, murmur apprec  Lungs: Dec breath sounds in bilateral bases  Abdomen: Soft, nontender, nondistended, BS+  Extremities: No edema, clubbing, cyanosis in upper or lower extremities.  Neuro: AAOx3, NFD.    Labs:  CBC (03-29 @ 09:30)                        Hgb: 13.5   WBC: 11.81 )-----------------( Plts: 311                              Hct: 44.3     Chem (03-28 @ 08:26)  Na: 140  |     Cl: 99     |  BUN: 28  -----------------------------------------< Gluc: 135    K: 4.1   |    HCO3: 29  |  Cr: 0.8    Ca 10.0 (03-28 @ 08:26)

## 2021-03-29 NOTE — PROGRESS NOTE ADULT - SUBJECTIVE AND OBJECTIVE BOX
PAULA ELMORE  56y Female    CHIEF COMPLAINT:    Patient is a 56y old  Female who presents with a chief complaint of Shortness of breath (29 Mar 2021 09:57)      INTERVAL HPI/OVERNIGHT EVENTS:    Patient seen and examined. No acute events overnight. remains on NRB    ROS: All other systems are negative.    Vital Signs:    T(F): 98.1 (03-29-21 @ 08:00), Max: 98.6 (03-28-21 @ 17:40)  HR: 108 (03-29-21 @ 08:00) (68 - 108)  BP: 130/60 (03-29-21 @ 08:00) (120/62 - 130/60)  RR: 20 (03-29-21 @ 06:10) (19 - 20)  SpO2: 94% (03-29-21 @ 08:00) (94% - 99%)    28 Mar 2021 07:01  -  29 Mar 2021 07:00  --------------------------------------------------------  IN: 0 mL / OUT: 700 mL / NET: -700 mL    29 Mar 2021 07:01  -  29 Mar 2021 11:33  --------------------------------------------------------  IN: 240 mL / OUT: 0 mL / NET: 240 mL    POCT Blood Glucose.: 121 mg/dL (29 Mar 2021 07:48)  POCT Blood Glucose.: 102 mg/dL (28 Mar 2021 21:29)  POCT Blood Glucose.: 123 mg/dL (28 Mar 2021 17:21)    PHYSICAL EXAM:    GENERAL:  NAD  SKIN: No rashes or lesions  HEENT: Atraumatic. Normocephalic.   NECK: Supple, No JVD.  PULMONARY: Coarse breath sounds. No wheezing. No rales  CVS: Normal S1, S2. Rate and Rhythm are regular. No murmurs.  ABDOMEN/GI: Soft, Nontender, Nondistended; BS present  MSK:  No edema B/L LE. No clubbing or cyanosis  NEUROLOGIC: moves all extremities  PSYCH: Alert & oriented x 3, normal affect    Consultant(s) Notes Reviewed:  [x ] YES  [ ] NO  Care Discussed with Consultants/Other Providers [ x] YES  [ ] NO    LABS:                        13.5   11.81 )-----------( 311      ( 29 Mar 2021 09:30 )             44.3     141  |  99  |  25<H>  ----------------------------<  119<H>  3.8   |  28  |  0.8    Ca    9.9      29 Mar 2021 09:30  Mg     2.2     03-29    Serum Pro-Brain Natriuretic Peptide: 66 pg/mL (03-28-21 @ 08:26)    RADIOLOGY & ADDITIONAL TESTS:  Imaging or report Personally Reviewed:  [x] YES  [ ] NO  EKG reviewed: [x] YES  [ ] NO    Medications:  Standing  acetaminophen   Tablet .. 650 milliGRAM(s) Oral once  chlorhexidine 4% Liquid 1 Application(s) Topical daily  dexAMETHasone  Injectable 6 milliGRAM(s) IV Push daily  enoxaparin Injectable 40 milliGRAM(s) SubCutaneous every 12 hours  insulin glargine Injectable (LANTUS) 20 Unit(s) SubCutaneous at bedtime  insulin lispro (ADMELOG) corrective regimen sliding scale   SubCutaneous three times a day before meals  insulin lispro Injectable (ADMELOG) 3 Unit(s) SubCutaneous three times a day before meals  metoprolol tartrate 25 milliGRAM(s) Oral two times a day  pantoprazole    Tablet 40 milliGRAM(s) Oral before breakfast    PRN Meds  acetaminophen   Tablet .. 650 milliGRAM(s) Oral every 6 hours PRN  guaifenesin/dextromethorphan  Syrup 10 milliLiter(s) Oral every 6 hours PRN

## 2021-03-29 NOTE — PROGRESS NOTE ADULT - ASSESSMENT
56 year old F with PMHx of HTN, admitted for acute hypoxemic respiratory failure due to COVID PNA.    #Acute hypoxemic respiratory failure secondary to COVID-19 pneumonia  - COVID-19 PCR positive  - Isolation precautions (contact, droplet, airborne)  - Chest X-ray 3/28 dec bilateral opacities  - Patient is saturating ~90% on NRB  - D-Dimer Assay, Quantitative: 2567 ng/mL DDU (03-28-21 @ 08:26)  - LE Duplex negative 3/23  - s/p RDV, course of Levaquin  - C/w dexamethasone 6mg IV once daily   - DVT prophylaxis per protocol  - Titrate supplemental O2 to maintain SpO2 92-96%    #HTN  #Sinus tachycardia  - C/w Metoprolol 25 mg BID    #DMII  - HbA1c 6.9%  - FS well controlled  - C/w Lantus 20 U, Lispro 3 U    DVT ppx: Lovenox 40 mg BID  GI ppx: PPI  Diet: CC  Activity: IAT  Full code  Dispo: acute, c/w CEU monitoring 56 year old F with PMHx of HTN, admitted for acute hypoxemic respiratory failure due to COVID PNA.    #Acute hypoxemic respiratory failure secondary to COVID-19 pneumonia  - COVID-19 PCR positive  - Isolation precautions (contact, droplet, airborne)  - Chest X-ray 3/28 dec bilateral opacities  - Patient is saturating ~90% on NRB  - D-Dimer Assay, Quantitative: 2567 ng/mL DDU (03-28-21 @ 08:26)  - LE Duplex negative 3/23  - Will obtain CT Angio to rule out PE as pt remains on NRB with minimal improvement   - s/p RDV, course of Levaquin  - C/w dexamethasone 6mg IV once daily   - DVT prophylaxis per protocol  - Titrate supplemental O2 to maintain SpO2 92-96%    #HTN  #Sinus tachycardia  - C/w Metoprolol 25 mg BID    #DMII  - HbA1c 6.9%  - FS well controlled  - C/w Lantus 20 U, Lispro 3 U    DVT ppx: Lovenox 40 mg BID  GI ppx: PPI  Diet: CC  Activity: IAT  Full code  Dispo: acute, c/w CEU monitoring

## 2021-03-29 NOTE — PROGRESS NOTE ADULT - ASSESSMENT
Patient is a 56-year-old, female patient, PMHx: Hypertension, presented to the ED for Shortness of breath and found to have COVID19 PNA    Acute Hypoxemic Respiratory Failure secondary to COVID 19 PNA  currently on NRB, saturating low 90s%  patient does not tolerate HFNC despite encouragement  repeat chest xray with increasing b/l opacities--->s/p Lasix iv x2  s/p RDV, on decadron 6mg daily, started 3/10  s/p 7 day course of levaquin  DDimers trending down to 2500, peaked 6600  LE duplex negative 3/23.  On Lovenox to 40 Q12H  check CTA chest to rule out PE  taper supplemental o2 as tolerated  out of bed to chair    HTN  Sinus tachycardia  continue with metoprolol 25 Q12H    DM2, new onset  Hba1c 6.9 (03/12)  Lantus/lispro 20-3-3-3 and ISS  monitor FS    #Progress Note Handoff  Pending (specify): Tapering off NRB  Family discussion: d/w pt regarding tapering down O2 requirements  Disposition: home versus east when oxygen requirements decrease

## 2021-03-30 LAB
ANION GAP SERPL CALC-SCNC: 11 MMOL/L — SIGNIFICANT CHANGE UP (ref 7–14)
BASOPHILS # BLD AUTO: 0.05 K/UL — SIGNIFICANT CHANGE UP (ref 0–0.2)
BASOPHILS NFR BLD AUTO: 0.6 % — SIGNIFICANT CHANGE UP (ref 0–1)
BUN SERPL-MCNC: 24 MG/DL — HIGH (ref 10–20)
CALCIUM SERPL-MCNC: 9.6 MG/DL — SIGNIFICANT CHANGE UP (ref 8.5–10.1)
CHLORIDE SERPL-SCNC: 99 MMOL/L — SIGNIFICANT CHANGE UP (ref 98–110)
CO2 SERPL-SCNC: 29 MMOL/L — SIGNIFICANT CHANGE UP (ref 17–32)
CREAT SERPL-MCNC: 0.8 MG/DL — SIGNIFICANT CHANGE UP (ref 0.7–1.5)
EOSINOPHIL # BLD AUTO: 0.25 K/UL — SIGNIFICANT CHANGE UP (ref 0–0.7)
EOSINOPHIL NFR BLD AUTO: 2.8 % — SIGNIFICANT CHANGE UP (ref 0–8)
GLUCOSE BLDC GLUCOMTR-MCNC: 110 MG/DL — HIGH (ref 70–99)
GLUCOSE BLDC GLUCOMTR-MCNC: 134 MG/DL — HIGH (ref 70–99)
GLUCOSE BLDC GLUCOMTR-MCNC: 141 MG/DL — HIGH (ref 70–99)
GLUCOSE BLDC GLUCOMTR-MCNC: 155 MG/DL — HIGH (ref 70–99)
GLUCOSE BLDC GLUCOMTR-MCNC: 99 MG/DL — SIGNIFICANT CHANGE UP (ref 70–99)
GLUCOSE SERPL-MCNC: 104 MG/DL — HIGH (ref 70–99)
HCT VFR BLD CALC: 41.8 % — SIGNIFICANT CHANGE UP (ref 37–47)
HGB BLD-MCNC: 12.7 G/DL — SIGNIFICANT CHANGE UP (ref 12–16)
IMM GRANULOCYTES NFR BLD AUTO: 1 % — HIGH (ref 0.1–0.3)
LYMPHOCYTES # BLD AUTO: 1.74 K/UL — SIGNIFICANT CHANGE UP (ref 1.2–3.4)
LYMPHOCYTES # BLD AUTO: 19.6 % — LOW (ref 20.5–51.1)
MCHC RBC-ENTMCNC: 28.6 PG — SIGNIFICANT CHANGE UP (ref 27–31)
MCHC RBC-ENTMCNC: 30.4 G/DL — LOW (ref 32–37)
MCV RBC AUTO: 94.1 FL — SIGNIFICANT CHANGE UP (ref 81–99)
MONOCYTES # BLD AUTO: 0.57 K/UL — SIGNIFICANT CHANGE UP (ref 0.1–0.6)
MONOCYTES NFR BLD AUTO: 6.4 % — SIGNIFICANT CHANGE UP (ref 1.7–9.3)
NEUTROPHILS # BLD AUTO: 6.2 K/UL — SIGNIFICANT CHANGE UP (ref 1.4–6.5)
NEUTROPHILS NFR BLD AUTO: 69.6 % — SIGNIFICANT CHANGE UP (ref 42.2–75.2)
NRBC # BLD: 0 /100 WBCS — SIGNIFICANT CHANGE UP (ref 0–0)
PLATELET # BLD AUTO: 281 K/UL — SIGNIFICANT CHANGE UP (ref 130–400)
POTASSIUM SERPL-MCNC: 3.9 MMOL/L — SIGNIFICANT CHANGE UP (ref 3.5–5)
POTASSIUM SERPL-SCNC: 3.9 MMOL/L — SIGNIFICANT CHANGE UP (ref 3.5–5)
RBC # BLD: 4.44 M/UL — SIGNIFICANT CHANGE UP (ref 4.2–5.4)
RBC # FLD: 13.2 % — SIGNIFICANT CHANGE UP (ref 11.5–14.5)
SODIUM SERPL-SCNC: 139 MMOL/L — SIGNIFICANT CHANGE UP (ref 135–146)
WBC # BLD: 8.9 K/UL — SIGNIFICANT CHANGE UP (ref 4.8–10.8)
WBC # FLD AUTO: 8.9 K/UL — SIGNIFICANT CHANGE UP (ref 4.8–10.8)

## 2021-03-30 PROCEDURE — 99232 SBSQ HOSP IP/OBS MODERATE 35: CPT

## 2021-03-30 RX ADMIN — Medication 3 UNIT(S): at 07:44

## 2021-03-30 RX ADMIN — INSULIN GLARGINE 20 UNIT(S): 100 INJECTION, SOLUTION SUBCUTANEOUS at 21:18

## 2021-03-30 RX ADMIN — Medication 650 MILLIGRAM(S): at 12:11

## 2021-03-30 RX ADMIN — Medication 25 MILLIGRAM(S): at 06:17

## 2021-03-30 RX ADMIN — Medication 650 MILLIGRAM(S): at 11:49

## 2021-03-30 RX ADMIN — Medication 25 MILLIGRAM(S): at 17:11

## 2021-03-30 RX ADMIN — Medication 3 UNIT(S): at 16:54

## 2021-03-30 RX ADMIN — ENOXAPARIN SODIUM 40 MILLIGRAM(S): 100 INJECTION SUBCUTANEOUS at 17:11

## 2021-03-30 RX ADMIN — ENOXAPARIN SODIUM 40 MILLIGRAM(S): 100 INJECTION SUBCUTANEOUS at 06:16

## 2021-03-30 RX ADMIN — Medication 6 MILLIGRAM(S): at 06:17

## 2021-03-30 RX ADMIN — PANTOPRAZOLE SODIUM 40 MILLIGRAM(S): 20 TABLET, DELAYED RELEASE ORAL at 06:18

## 2021-03-30 RX ADMIN — Medication 1: at 11:55

## 2021-03-30 RX ADMIN — CHLORHEXIDINE GLUCONATE 1 APPLICATION(S): 213 SOLUTION TOPICAL at 11:53

## 2021-03-30 RX ADMIN — Medication 3 UNIT(S): at 11:55

## 2021-03-30 NOTE — PROGRESS NOTE ADULT - ASSESSMENT
56 year old F with PMHx of HTN, admitted for acute hypoxemic respiratory failure due to COVID PNA.    #Acute hypoxemic respiratory failure secondary to COVID-19 pneumonia  - COVID-19 PCR positive  - Isolation precautions (contact, droplet, airborne)  - Chest X-ray 3/28 dec bilateral opacities  - Patient is saturating ~100% on NRB  - D-Dimer Assay, Quantitative: 2567 ng/mL DDU (03-28-21 @ 08:26)  - LE Duplex negative 3/23, CT Angio 3/29 negative for PE  - s/p RDV, course of Levaquin  - C/w dexamethasone 6mg IV once daily   - DVT prophylaxis per protocol  - Titrate supplemental O2 to maintain SpO2 92-96%, will try to transition to O2 NC    #HTN  #Sinus tachycardia  - C/w Metoprolol 25 mg BID    #DMII  - HbA1c 6.9%  - FS well controlled  - C/w Lantus 20 U, Lispro 3 U    DVT ppx: Lovenox 40 mg BID  GI ppx: PPI  Diet: CC  Activity: IAT  Full code  Dispo: acute, c/w CEU monitoring   56 year old F with PMHx of HTN, admitted for acute hypoxemic respiratory failure due to COVID PNA.    #Acute hypoxemic respiratory failure secondary to COVID-19 pneumonia  - COVID-19 PCR positive  - Isolation precautions (contact, droplet, airborne)  - Chest X-ray 3/28 dec bilateral opacities  - Patient is saturating ~100% on NRB  - D-Dimer Assay, Quantitative: 2567 ng/mL DDU (03-28-21 @ 08:26)  - LE Duplex negative 3/23, CT Angio 3/29 negative for PE  - s/p RDV, course of Levaquin  - C/w dexamethasone 6mg IV once daily   - DVT prophylaxis per protocol  - Titrate supplemental O2 to maintain SpO2 92-96%, will try to transition to O2 NC    #HTN  #Sinus tachycardia  - C/w Metoprolol 25 mg BID    #DMII  - HbA1c 6.9%  - FS well controlled  - C/w Lantus 20 U, Lispro 3 U    DVT ppx: Lovenox 40 mg BID  GI ppx: PPI  Diet: CC  Activity: IAT  Full code  Dispo: acute, c/w CEU monitoring    Spoke to  - gave medical update and answered all questions.

## 2021-03-30 NOTE — PROGRESS NOTE ADULT - SUBJECTIVE AND OBJECTIVE BOX
PAULA ELMORE  56y Female    CHIEF COMPLAINT:    Patient is a 56y old  Female who presents with a chief complaint of Shortness of breath (30 Mar 2021 09:58)      INTERVAL HPI/OVERNIGHT EVENTS:    Patient seen and examined. No acute events overnight. remains on NRB    ROS: All other systems are negative.    Vital Signs:    T(F): 98 (03-30-21 @ 08:00), Max: 98.7 (03-29-21 @ 23:38)  HR: 68 (03-30-21 @ 08:00) (66 - 99)  BP: 115/58 (03-30-21 @ 08:00) (115/54 - 145/67)  RR: 20 (03-30-21 @ 08:00) (18 - 28)  SpO2: 100% (03-30-21 @ 08:00) (95% - 100%)    29 Mar 2021 07:01  -  30 Mar 2021 07:00  --------------------------------------------------------  IN: 240 mL / OUT: 350 mL / NET: -110 mL    POCT Blood Glucose.: 110 mg/dL (30 Mar 2021 07:32)  POCT Blood Glucose.: 99 mg/dL (29 Mar 2021 20:34)  POCT Blood Glucose.: 82 mg/dL (29 Mar 2021 17:17)  POCT Blood Glucose.: 116 mg/dL (29 Mar 2021 11:39)  POCT Blood Glucose.: 95 mg/dL (29 Mar 2021 11:36)    PHYSICAL EXAM:    GENERAL:  NAD  SKIN: No rashes or lesions  HEENT: Atraumatic. Normocephalic.    NECK: Supple, No JVD.    PULMONARY: poor inspiratory effort. No wheezing. No rales  CVS: Normal S1, S2. Rate and Rhythm are regular.    ABDOMEN/GI: Soft, Nontender, Nondistended   MSK:  No edema B/L LE. No clubbing or cyanosis  NEUROLOGIC: moves all extremities  PSYCH: Alert & oriented x 3, normal affect    Consultant(s) Notes Reviewed:  [x ] YES  [ ] NO  Care Discussed with Consultants/Other Providers [ x] YES  [ ] NO    LABS:                        12.7   8.90  )-----------( 281      ( 30 Mar 2021 06:57 )             41.8     139  |  99  |  24<H>  ----------------------------<  104<H>  3.9   |  29  |  0.8    Ca    9.6      30 Mar 2021 06:57  Mg     2.2     03-29    Serum Pro-Brain Natriuretic Peptide: 66 pg/mL (03-28-21 @ 08:26)    RADIOLOGY & ADDITIONAL TESTS:  Imaging or report Personally Reviewed:  [x] YES  [ ] NO  EKG reviewed: [x] YES  [ ] NO    Medications:  Standing  acetaminophen   Tablet .. 650 milliGRAM(s) Oral once  chlorhexidine 4% Liquid 1 Application(s) Topical daily  dexAMETHasone  Injectable 6 milliGRAM(s) IV Push daily  enoxaparin Injectable 40 milliGRAM(s) SubCutaneous every 12 hours  insulin glargine Injectable (LANTUS) 20 Unit(s) SubCutaneous at bedtime  insulin lispro (ADMELOG) corrective regimen sliding scale   SubCutaneous three times a day before meals  insulin lispro Injectable (ADMELOG) 3 Unit(s) SubCutaneous three times a day before meals  metoprolol tartrate 25 milliGRAM(s) Oral two times a day  pantoprazole    Tablet 40 milliGRAM(s) Oral before breakfast    PRN Meds  acetaminophen   Tablet .. 650 milliGRAM(s) Oral every 6 hours PRN  guaifenesin/dextromethorphan  Syrup 10 milliLiter(s) Oral every 6 hours PRN

## 2021-03-30 NOTE — PROGRESS NOTE ADULT - SUBJECTIVE AND OBJECTIVE BOX
Patient Information:  PAULA SUMO / 56y / Female / MRN#:[id number]    Hospital Day: 20d    Interval History:  Patient seen and examined at bedside. No acute events overnight. Pt sitting up in bed, on NRB. Appears comfortable, is saturating %. IS willing to try O2 NC today.    Past Medical History:    Past Surgical History:    Allergies:  Allergy Status Unknown    Medications:  PRN:  acetaminophen   Tablet .. 650 milliGRAM(s) Oral every 6 hours PRN Temp greater or equal to 38C (100.4F), Mild Pain (1 - 3)  guaifenesin/dextromethorphan  Syrup 10 milliLiter(s) Oral every 6 hours PRN Cough    Standing:  acetaminophen   Tablet .. 650 milliGRAM(s) Oral once  chlorhexidine 4% Liquid 1 Application(s) Topical daily  dexAMETHasone  Injectable 6 milliGRAM(s) IV Push daily  enoxaparin Injectable 40 milliGRAM(s) SubCutaneous every 12 hours  insulin glargine Injectable (LANTUS) 20 Unit(s) SubCutaneous at bedtime  insulin lispro (ADMELOG) corrective regimen sliding scale   SubCutaneous three times a day before meals  insulin lispro Injectable (ADMELOG) 3 Unit(s) SubCutaneous three times a day before meals  metoprolol tartrate 25 milliGRAM(s) Oral two times a day  pantoprazole    Tablet 40 milliGRAM(s) Oral before breakfast    Home:    Vitals:  T(C): 36.7, Max: 37.1 (03-29-21 @ 23:38)  T(F): 98, Max: 98.7 (03-29-21 @ 23:38)  HR: 68 (66 - 99)  BP: 115/58 (115/54 - 145/67)  RR: 20 (18 - 28)  SpO2: 100% (95% - 100%)    Physical Exam:  General: Awake, alert, NAD, sitting up in bed on NRB  HEENT: Head NC/AT  Heart: Regular rhythm, rate; loud systolic murmur best heard at LUSB  Lungs: Dec breath sounds in bilateral bases  Abdomen: Soft, nontender, nondistended, BS+  Extremities: No edema, clubbing, cyanosis in upper or lower extremities  Neuro: AAOx3, NFD    Labs:  CBC (03-30 @ 06:57)                        Hgb: 12.7   WBC: 8.90  )-----------------( Plts: 281                              Hct: 41.8     Chem (03-30 @ 06:57)  Na: 139  |     Cl: 99     |  BUN: 24  -----------------------------------------< Gluc: 104    K: 3.9   |    HCO3: 29  |  Cr: 0.8    Ca 9.6 (03-30 @ 06:57)  Mg 2.2 (03-29 @ 09:30)

## 2021-03-30 NOTE — PROGRESS NOTE ADULT - ASSESSMENT
Patient is a 56-year-old, female patient, PMHx: Hypertension, presented to the ED for Shortness of breath and found to have COVID19 PNA    Acute Hypoxemic Respiratory Failure secondary to COVID 19 PNA  currently on NRB, saturating 100%. Taper down to venti mask  s/p Lasix iv x2  s/p RDV, on decadron 6mg daily, started 3/10  Decrease Decadron to 4mg daily today  s/p 7 day course of levaquin  DDimers trending down to 2500, peaked 6600  LE duplex negative 3/23.  CTA check no evidence of PE on 3/29  On Lovenox to 40 Q12H  taper supplemental o2 as tolerated  out of bed to chair    HTN  Sinus tachycardia  continue with metoprolol 25 Q12H    DM2, new onset  Hba1c 6.9 (03/12)  Lantus/lispro 20-3-3-3 and ISS  monitor FS    #Progress Note Handoff  Pending (specify): Tapering off supplemental oxygen  Family discussion: d/w pt regarding tapering down O2 requirements  Disposition: home versus east when oxygen requirements decrease

## 2021-03-31 LAB
ANION GAP SERPL CALC-SCNC: 13 MMOL/L — SIGNIFICANT CHANGE UP (ref 7–14)
BASOPHILS # BLD AUTO: 0.03 K/UL — SIGNIFICANT CHANGE UP (ref 0–0.2)
BASOPHILS NFR BLD AUTO: 0.3 % — SIGNIFICANT CHANGE UP (ref 0–1)
BUN SERPL-MCNC: 21 MG/DL — HIGH (ref 10–20)
CALCIUM SERPL-MCNC: 9.7 MG/DL — SIGNIFICANT CHANGE UP (ref 8.5–10.1)
CHLORIDE SERPL-SCNC: 100 MMOL/L — SIGNIFICANT CHANGE UP (ref 98–110)
CO2 SERPL-SCNC: 28 MMOL/L — SIGNIFICANT CHANGE UP (ref 17–32)
CREAT SERPL-MCNC: 0.7 MG/DL — SIGNIFICANT CHANGE UP (ref 0.7–1.5)
EOSINOPHIL # BLD AUTO: 0.22 K/UL — SIGNIFICANT CHANGE UP (ref 0–0.7)
EOSINOPHIL NFR BLD AUTO: 2 % — SIGNIFICANT CHANGE UP (ref 0–8)
GLUCOSE BLDC GLUCOMTR-MCNC: 138 MG/DL — HIGH (ref 70–99)
GLUCOSE BLDC GLUCOMTR-MCNC: 140 MG/DL — HIGH (ref 70–99)
GLUCOSE BLDC GLUCOMTR-MCNC: 155 MG/DL — HIGH (ref 70–99)
GLUCOSE BLDC GLUCOMTR-MCNC: 192 MG/DL — HIGH (ref 70–99)
GLUCOSE SERPL-MCNC: 109 MG/DL — HIGH (ref 70–99)
HCT VFR BLD CALC: 40.9 % — SIGNIFICANT CHANGE UP (ref 37–47)
HGB BLD-MCNC: 12.5 G/DL — SIGNIFICANT CHANGE UP (ref 12–16)
IMM GRANULOCYTES NFR BLD AUTO: 1.1 % — HIGH (ref 0.1–0.3)
LYMPHOCYTES # BLD AUTO: 2.2 K/UL — SIGNIFICANT CHANGE UP (ref 1.2–3.4)
LYMPHOCYTES # BLD AUTO: 20.3 % — LOW (ref 20.5–51.1)
MCHC RBC-ENTMCNC: 28.3 PG — SIGNIFICANT CHANGE UP (ref 27–31)
MCHC RBC-ENTMCNC: 30.6 G/DL — LOW (ref 32–37)
MCV RBC AUTO: 92.7 FL — SIGNIFICANT CHANGE UP (ref 81–99)
MONOCYTES # BLD AUTO: 0.61 K/UL — HIGH (ref 0.1–0.6)
MONOCYTES NFR BLD AUTO: 5.6 % — SIGNIFICANT CHANGE UP (ref 1.7–9.3)
NEUTROPHILS # BLD AUTO: 7.66 K/UL — HIGH (ref 1.4–6.5)
NEUTROPHILS NFR BLD AUTO: 70.7 % — SIGNIFICANT CHANGE UP (ref 42.2–75.2)
NRBC # BLD: 0 /100 WBCS — SIGNIFICANT CHANGE UP (ref 0–0)
PLATELET # BLD AUTO: 261 K/UL — SIGNIFICANT CHANGE UP (ref 130–400)
POTASSIUM SERPL-MCNC: 4 MMOL/L — SIGNIFICANT CHANGE UP (ref 3.5–5)
POTASSIUM SERPL-SCNC: 4 MMOL/L — SIGNIFICANT CHANGE UP (ref 3.5–5)
RBC # BLD: 4.41 M/UL — SIGNIFICANT CHANGE UP (ref 4.2–5.4)
RBC # FLD: 12.9 % — SIGNIFICANT CHANGE UP (ref 11.5–14.5)
SODIUM SERPL-SCNC: 141 MMOL/L — SIGNIFICANT CHANGE UP (ref 135–146)
WBC # BLD: 10.84 K/UL — HIGH (ref 4.8–10.8)
WBC # FLD AUTO: 10.84 K/UL — HIGH (ref 4.8–10.8)

## 2021-03-31 PROCEDURE — 99232 SBSQ HOSP IP/OBS MODERATE 35: CPT

## 2021-03-31 RX ADMIN — Medication 3 UNIT(S): at 12:37

## 2021-03-31 RX ADMIN — Medication 25 MILLIGRAM(S): at 06:18

## 2021-03-31 RX ADMIN — ENOXAPARIN SODIUM 40 MILLIGRAM(S): 100 INJECTION SUBCUTANEOUS at 17:12

## 2021-03-31 RX ADMIN — INSULIN GLARGINE 20 UNIT(S): 100 INJECTION, SOLUTION SUBCUTANEOUS at 22:08

## 2021-03-31 RX ADMIN — CHLORHEXIDINE GLUCONATE 1 APPLICATION(S): 213 SOLUTION TOPICAL at 12:36

## 2021-03-31 RX ADMIN — ENOXAPARIN SODIUM 40 MILLIGRAM(S): 100 INJECTION SUBCUTANEOUS at 06:17

## 2021-03-31 RX ADMIN — Medication 6 MILLIGRAM(S): at 06:17

## 2021-03-31 RX ADMIN — Medication 3 UNIT(S): at 08:13

## 2021-03-31 RX ADMIN — Medication 3 UNIT(S): at 17:12

## 2021-03-31 RX ADMIN — PANTOPRAZOLE SODIUM 40 MILLIGRAM(S): 20 TABLET, DELAYED RELEASE ORAL at 05:54

## 2021-03-31 RX ADMIN — Medication 25 MILLIGRAM(S): at 17:12

## 2021-03-31 RX ADMIN — Medication 1: at 17:12

## 2021-03-31 RX ADMIN — Medication 1: at 12:36

## 2021-03-31 NOTE — PROGRESS NOTE ADULT - SUBJECTIVE AND OBJECTIVE BOX
PAULA ELMORE  56y, Female  Allergy: Allergy Status Unknown    Hospital Day: 21d    Patient seen and examined earlier today. No acute events overnight.    PMH/PSH:  PAST MEDICAL & SURGICAL HISTORY:    VITALS:  T(F): 98.7 (03-31-21 @ 08:59), Max: 98.7 (03-30-21 @ 12:00)  HR: 95 (03-31-21 @ 08:59)  BP: 118/66 (03-31-21 @ 08:59) (116/58 - 134/62)  RR: 20 (03-31-21 @ 08:59)  SpO2: 95% (03-31-21 @ 08:59)    TESTS & MEASUREMENTS:  Weight (Kg):     03-29-21 @ 07:01  -  03-30-21 @ 07:00  --------------------------------------------------------  IN: 240 mL / OUT: 350 mL / NET: -110 mL    03-30-21 @ 07:01  -  03-31-21 @ 07:00  --------------------------------------------------------  IN: 320 mL / OUT: 300 mL / NET: 20 mL                        12.5   10.84 )-----------( 261      ( 31 Mar 2021 06:46 )             40.9     03-31    141  |  100  |  21<H>  ----------------------------<  109<H>  4.0   |  28  |  0.7    Ca    9.7      31 Mar 2021 06:46      MEDICATIONS:  MEDICATIONS  (STANDING):  acetaminophen   Tablet .. 650 milliGRAM(s) Oral once  chlorhexidine 4% Liquid 1 Application(s) Topical daily  enoxaparin Injectable 40 milliGRAM(s) SubCutaneous every 12 hours  insulin glargine Injectable (LANTUS) 20 Unit(s) SubCutaneous at bedtime  insulin lispro (ADMELOG) corrective regimen sliding scale   SubCutaneous three times a day before meals  insulin lispro Injectable (ADMELOG) 3 Unit(s) SubCutaneous three times a day before meals  metoprolol tartrate 25 milliGRAM(s) Oral two times a day  pantoprazole    Tablet 40 milliGRAM(s) Oral before breakfast    MEDICATIONS  (PRN):  acetaminophen   Tablet .. 650 milliGRAM(s) Oral every 6 hours PRN Temp greater or equal to 38C (100.4F), Mild Pain (1 - 3)  guaifenesin/dextromethorphan  Syrup 10 milliLiter(s) Oral every 6 hours PRN Cough    HOME MEDICATIONS:    REVIEW OF SYSTEMS:  All other review of systems is negative unless indicated above.     PHYSICAL EXAM:  GENERAL: NAD  HEENT: No Swelling  CHEST/LUNG: Good air entry, No wheezing  HEART: RRR, No murmurs  ABDOMEN: Soft, Bowel sounds present  EXTREMITIES:  No clubbing

## 2021-03-31 NOTE — PROGRESS NOTE ADULT - ASSESSMENT
Patient is a 56-year-old, female patient, PMHx: Hypertension, presented to the ED for Shortness of breath and found to have COVID19 PNA    #Acute Hypoxic Respiratory Failure  #COVID 19 PNA  Currently on % saturating 94-96, pt does not tolerate HFNC  s/p RDV therapy  Ddimers elevated to 33K on 03/16, LE duplex negative  s/p Levaquin x7d (03/12-03/19)  CTA 03/29: No PE  - Cont decadron 6mg qD (Start 03/10), start taper  - Lovenox DVT PPX 40 BID  - Taper down oxygen requirements as tolerated     #HTN  #Sinus tachycardia  - Metoprolol tartrate 25 BID    #DM2, new onset  Hba1c 6.9 (03/12)  - Lantus/lispro 20-3-3-3  - ISS  - Once downgraded, endocrine consult for outpt tx recommendations    #Progress Note Handoff  Pending (specify): Tapering off NRB and HFNC  Family discussion: d/w pt regarding tapering down O2 requirements  Disposition: Requiring HFNC, TBD

## 2021-03-31 NOTE — PROGRESS NOTE ADULT - ASSESSMENT
Patient is a 56-year-old, female patient, PMHx: Hypertension, presented to the ED for Shortness of breath and found to have COVID19 PNA    #Acute Hypoxic Respiratory Failure  #COVID 19 PNA  Currently on % saturating 94-96%, pt does not tolerate HFNC  s/p RDV therapy  Ddimers elevated to 33K on 03/16, LE duplex negative  s/p Levaquin x7d (03/12-03/19)  - Cont decadron 6mg qD (Start 03/10), wean off decadron  - Lovenox therapeutic 100mg BID  - Trend inflammatory markers q48-72h  - Taper down oxygen requirements as tolerated  - f/u repeat CXR    #HTN  #Sinus tachycardia  - Metoprolol tartrate 25 BID  Hypertension with sinus tachy- started on lopressor 25mg po twice daily tx.    #DM2, new onset  Hba1c 6.9 (03/12)  - Lantus/lispro 20-3-3-3  - ISS  - Once downgraded, endocrine consult for outpt tx recommendations    #Progress Note Handoff  Pending (specify): Tapering off NRB and HFNC  Family discussion: d/w pt regarding tapering down O2 requirements  Disposition: Requiring HFNC, TBD

## 2021-03-31 NOTE — CHART NOTE - NSCHARTNOTEFT_GEN_A_CORE
Registered Dietitian Limited Follow Up:  Pertinent Medical Information: Acute hypoxemic respiratory failure secondary to COVID-19 PNA--tolerating NRB today satting %. T2DM--Lantus 20u, Lispro 3u.     Unable to conduct face to face assessment d/t COVID-19 isolation precautions. Spoke to PCA who reports pt continues w. fair intake, consumes 50-75% of meals on average & Glucerna intermittently. Staff notes that intake occasionally <50% based on daily symptoms. Family occasionally drops off food from home as well. No note of trouble chewing/swallowing. RN unsure of LBM & no recent documentation in EMR. No abd distention noted. Labs/meds reviewed. Continue CHO consistent diet w/ Glucerna TID. RD to reassess in 7 days.

## 2021-03-31 NOTE — PROGRESS NOTE ADULT - SUBJECTIVE AND OBJECTIVE BOX
Patient Information:  PAULA SUMO / 56y / Female / MRN#:[id number]    Hospital Day: 21d    Interval History:  Patient seen and examined at bedside. No acute events overnight. Pt remains on NRB.    Past Medical History:    Past Surgical History:    Allergies:  Allergy Status Unknown    Medications:  PRN:  acetaminophen   Tablet .. 650 milliGRAM(s) Oral every 6 hours PRN Temp greater or equal to 38C (100.4F), Mild Pain (1 - 3)  guaifenesin/dextromethorphan  Syrup 10 milliLiter(s) Oral every 6 hours PRN Cough    Standing:  acetaminophen   Tablet .. 650 milliGRAM(s) Oral once  chlorhexidine 4% Liquid 1 Application(s) Topical daily  enoxaparin Injectable 40 milliGRAM(s) SubCutaneous every 12 hours  insulin glargine Injectable (LANTUS) 20 Unit(s) SubCutaneous at bedtime  insulin lispro (ADMELOG) corrective regimen sliding scale   SubCutaneous three times a day before meals  insulin lispro Injectable (ADMELOG) 3 Unit(s) SubCutaneous three times a day before meals  metoprolol tartrate 25 milliGRAM(s) Oral two times a day  pantoprazole    Tablet 40 milliGRAM(s) Oral before breakfast    Home:    Vitals:  T(C): 37.1, Max: 37.1 (03-31-21 @ 08:59)  T(F): 98.7, Max: 98.7 (03-31-21 @ 08:59)  HR: 95 (76 - 95)  BP: 118/66 (117/57 - 134/62)  RR: 20 (20 - 22)  SpO2: 95% (94% - 98%)    Physical Exam:  General: Awake, alert, NAD, sitting up in bed on NRB  HEENT: Head NC/AT  Heart: Regular rhythm, rate; loud systolic murmur best heard at LUSB  Lungs: Dec breath sounds in bilateral bases  Abdomen: Soft, nontender, nondistended, BS+  Extremities: No edema, clubbing, cyanosis in upper or lower extremities  Neuro: AAOx3, NFD    Labs:  CBC (03-31 @ 06:46)                        Hgb: 12.5   WBC: 10.84 )-----------------( Plts: 261                              Hct: 40.9     Chem (03-31 @ 06:46)  Na: 141  |     Cl: 100     |  BUN: 21  -----------------------------------------< Gluc: 109    K: 4.0   |    HCO3: 28  |  Cr: 0.7    Ca 9.7 (03-31 @ 06:46)

## 2021-03-31 NOTE — PROGRESS NOTE ADULT - SUBJECTIVE AND OBJECTIVE BOX
PAULA ELMORE  56y, Female  Allergy: Allergy Status Unknown    Hospital Day: 21d    Patient seen and examined earlier today. No acute events overnight.    PMH/PSH:  PAST MEDICAL & SURGICAL HISTORY:    VITALS:  T(F): 98.7 (03-31-21 @ 08:59), Max: 98.7 (03-30-21 @ 12:00)  HR: 95 (03-31-21 @ 08:59)  BP: 118/66 (03-31-21 @ 08:59) (116/58 - 134/62)  RR: 20 (03-31-21 @ 08:59)  SpO2: 95% (03-31-21 @ 08:59)    TESTS & MEASUREMENTS:  Weight (Kg):     03-29-21 @ 07:01  -  03-30-21 @ 07:00  --------------------------------------------------------  IN: 240 mL / OUT: 350 mL / NET: -110 mL    03-30-21 @ 07:01  -  03-31-21 @ 07:00  --------------------------------------------------------  IN: 320 mL / OUT: 300 mL / NET: 20 mL                        12.5   10.84 )-----------( 261      ( 31 Mar 2021 06:46 )             40.9     03-31    141  |  100  |  21<H>  ----------------------------<  109<H>  4.0   |  28  |  0.7    Ca    9.7      31 Mar 2021 06:46    MEDICATIONS:  MEDICATIONS  (STANDING):  acetaminophen   Tablet .. 650 milliGRAM(s) Oral once  chlorhexidine 4% Liquid 1 Application(s) Topical daily  dexAMETHasone  Injectable 6 milliGRAM(s) IV Push daily  enoxaparin Injectable 40 milliGRAM(s) SubCutaneous every 12 hours  insulin glargine Injectable (LANTUS) 20 Unit(s) SubCutaneous at bedtime  insulin lispro (ADMELOG) corrective regimen sliding scale   SubCutaneous three times a day before meals  insulin lispro Injectable (ADMELOG) 3 Unit(s) SubCutaneous three times a day before meals  metoprolol tartrate 25 milliGRAM(s) Oral two times a day  pantoprazole    Tablet 40 milliGRAM(s) Oral before breakfast    MEDICATIONS  (PRN):  acetaminophen   Tablet .. 650 milliGRAM(s) Oral every 6 hours PRN Temp greater or equal to 38C (100.4F), Mild Pain (1 - 3)  guaifenesin/dextromethorphan  Syrup 10 milliLiter(s) Oral every 6 hours PRN Cough      HOME MEDICATIONS:      REVIEW OF SYSTEMS:  All other review of systems is negative unless indicated above.     PHYSICAL EXAM:  GENERAL: NAD  HEENT: No Swelling  CHEST/LUNG: Good air entry, No wheezing  HEART: RRR, No murmurs  ABDOMEN: Soft, Bowel sounds present  EXTREMITIES:  No clubbing

## 2021-03-31 NOTE — PROGRESS NOTE ADULT - ASSESSMENT
56 year old F with PMHx of HTN, admitted for acute hypoxemic respiratory failure due to COVID PNA.    #Acute hypoxemic respiratory failure secondary to COVID-19 pneumonia  - COVID-19 PCR positive  - Isolation precautions (contact, droplet, airborne)  - Chest X-ray 3/28 dec bilateral opacities  - Patient is saturating ~% on NRB  - D-Dimer Assay, Quantitative: 2567 ng/mL DDU (03-28-21 @ 08:26)  - LE Duplex negative 3/23, CT Angio 3/29 negative for PE  - s/p RDV, dexamethasone, course of Levaquin  - DVT prophylaxis per protocol  - Titrate supplemental O2 to maintain SpO2 92-96%, will try to transition to O2 NC    #HTN  #Sinus tachycardia  - C/w Metoprolol 25 mg BID    #DMII  - HbA1c 6.9%  - FS well controlled  - C/w Lantus 20 U, Lispro 3 U    DVT ppx: Lovenox 40 mg BID  GI ppx: PPI  Diet: CC  Activity: IAT  Full code  Dispo: acute, c/w CEU monitoring   56 year old F with PMHx of HTN, admitted for acute hypoxemic respiratory failure due to COVID PNA.    #Acute hypoxemic respiratory failure secondary to COVID-19 pneumonia  - COVID-19 PCR positive  - Isolation precautions (contact, droplet, airborne)  - Chest X-ray 3/28 dec bilateral opacities  - Patient is saturating ~% on NRB  - D-Dimer Assay, Quantitative: 2567 ng/mL DDU (03-28-21 @ 08:26)  - LE Duplex negative 3/23, CT Angio 3/29 negative for PE  - s/p RDV, dexamethasone, course of Levaquin  - DVT prophylaxis per protocol  - Titrate supplemental O2 to maintain SpO2 92-96%, will try to transition to O2 NC    #HTN  #Sinus tachycardia  - C/w Metoprolol 25 mg BID    #DMII  - HbA1c 6.9%  - FS well controlled  - C/w Lantus 20 U, Lispro 3 U    DVT ppx: Lovenox 40 mg BID  GI ppx: PPI  Diet: CC  Activity: IAT  Full code  Dispo: acute, c/w CEU monitoring    Spoke to  - gave medical update and answered all questions.

## 2021-04-01 LAB
GLUCOSE BLDC GLUCOMTR-MCNC: 113 MG/DL — HIGH (ref 70–99)
GLUCOSE BLDC GLUCOMTR-MCNC: 117 MG/DL — HIGH (ref 70–99)

## 2021-04-01 PROCEDURE — 99232 SBSQ HOSP IP/OBS MODERATE 35: CPT

## 2021-04-01 PROCEDURE — 93970 EXTREMITY STUDY: CPT | Mod: 26

## 2021-04-01 PROCEDURE — 71045 X-RAY EXAM CHEST 1 VIEW: CPT | Mod: 26

## 2021-04-01 RX ORDER — ENOXAPARIN SODIUM 100 MG/ML
40 INJECTION SUBCUTANEOUS
Refills: 0 | Status: DISCONTINUED | OUTPATIENT
Start: 2021-04-01 | End: 2021-04-06

## 2021-04-01 RX ORDER — FUROSEMIDE 40 MG
40 TABLET ORAL ONCE
Refills: 0 | Status: DISCONTINUED | OUTPATIENT
Start: 2021-04-01 | End: 2021-04-01

## 2021-04-01 RX ORDER — DEXTROSE 50 % IN WATER 50 %
15 SYRINGE (ML) INTRAVENOUS ONCE
Refills: 0 | Status: DISCONTINUED | OUTPATIENT
Start: 2021-04-01 | End: 2021-04-16

## 2021-04-01 RX ORDER — SODIUM CHLORIDE 9 MG/ML
1000 INJECTION, SOLUTION INTRAVENOUS
Refills: 0 | Status: DISCONTINUED | OUTPATIENT
Start: 2021-04-01 | End: 2021-04-16

## 2021-04-01 RX ORDER — INSULIN GLARGINE 100 [IU]/ML
10 INJECTION, SOLUTION SUBCUTANEOUS AT BEDTIME
Refills: 0 | Status: DISCONTINUED | OUTPATIENT
Start: 2021-04-01 | End: 2021-04-05

## 2021-04-01 RX ORDER — FUROSEMIDE 40 MG
40 TABLET ORAL ONCE
Refills: 0 | Status: COMPLETED | OUTPATIENT
Start: 2021-04-01 | End: 2021-04-01

## 2021-04-01 RX ORDER — ENOXAPARIN SODIUM 100 MG/ML
100 INJECTION SUBCUTANEOUS
Refills: 0 | Status: DISCONTINUED | OUTPATIENT
Start: 2021-04-01 | End: 2021-04-01

## 2021-04-01 RX ADMIN — INSULIN GLARGINE 10 UNIT(S): 100 INJECTION, SOLUTION SUBCUTANEOUS at 22:00

## 2021-04-01 RX ADMIN — Medication 650 MILLIGRAM(S): at 18:00

## 2021-04-01 RX ADMIN — Medication 40 MILLIGRAM(S): at 09:46

## 2021-04-01 RX ADMIN — PANTOPRAZOLE SODIUM 40 MILLIGRAM(S): 20 TABLET, DELAYED RELEASE ORAL at 08:13

## 2021-04-01 RX ADMIN — Medication 25 MILLIGRAM(S): at 05:54

## 2021-04-01 RX ADMIN — ENOXAPARIN SODIUM 40 MILLIGRAM(S): 100 INJECTION SUBCUTANEOUS at 18:40

## 2021-04-01 RX ADMIN — CHLORHEXIDINE GLUCONATE 1 APPLICATION(S): 213 SOLUTION TOPICAL at 11:59

## 2021-04-01 RX ADMIN — Medication 25 MILLIGRAM(S): at 18:40

## 2021-04-01 RX ADMIN — ENOXAPARIN SODIUM 40 MILLIGRAM(S): 100 INJECTION SUBCUTANEOUS at 05:55

## 2021-04-01 RX ADMIN — Medication 650 MILLIGRAM(S): at 19:35

## 2021-04-01 NOTE — PROGRESS NOTE ADULT - SUBJECTIVE AND OBJECTIVE BOX
Patient Information:  PAULA ELMORE / 56y / Female / MRN#:[id number]    Hospital Day: 22d    Interval History:  Patient seen and examined at bedside. No acute events overnight. Pt remains on NRB, states she is willing to try HFNC.    Past Medical History:    Past Surgical History:    Allergies:  Allergy Status Unknown    Medications:  PRN:  acetaminophen   Tablet .. 650 milliGRAM(s) Oral every 6 hours PRN Temp greater or equal to 38C (100.4F), Mild Pain (1 - 3)  guaifenesin/dextromethorphan  Syrup 10 milliLiter(s) Oral every 6 hours PRN Cough    Standing:  acetaminophen   Tablet .. 650 milliGRAM(s) Oral once  chlorhexidine 4% Liquid 1 Application(s) Topical daily  enoxaparin Injectable 40 milliGRAM(s) SubCutaneous every 12 hours  furosemide   Injectable 40 milliGRAM(s) IV Push once  insulin glargine Injectable (LANTUS) 20 Unit(s) SubCutaneous at bedtime  insulin lispro (ADMELOG) corrective regimen sliding scale   SubCutaneous three times a day before meals  insulin lispro Injectable (ADMELOG) 3 Unit(s) SubCutaneous three times a day before meals  metoprolol tartrate 25 milliGRAM(s) Oral two times a day  pantoprazole    Tablet 40 milliGRAM(s) Oral before breakfast    Home:    Vitals:  T(C): 36.6, Max: 37 (03-31-21 @ 13:00)  T(F): 97.9, Max: 98.6 (03-31-21 @ 13:00)  HR: 82 (67 - 88)  BP: 114/56 (114/56 - 135/65)  RR: 20 (20 - 22)  SpO2: 93% (93% - 98%)    Physical Exam:  General: Awake, alert, NAD, sitting up in bed on NRB  HEENT: Head NC/AT  Heart: Regular rhythm, rate; loud systolic murmur best heard at LUSB  Lungs: Dec breath sounds in bilateral bases  Abdomen: Soft, nontender, nondistended, BS+  Extremities: No edema, clubbing, cyanosis in upper or lower extremities  Neuro: AAOx3, NFD    Labs:  CBC (03-31 @ 06:46)                        Hgb: 12.5   WBC: 10.84 )-----------------( Plts: 261                              Hct: 40.9     Chem (03-31 @ 06:46)  Na: 141  |     Cl: 100     |  BUN: 21  -----------------------------------------< Gluc: 109    K: 4.0   |    HCO3: 28  |  Cr: 0.7    Ca 9.7 (03-31 @ 06:46)

## 2021-04-01 NOTE — PROGRESS NOTE ADULT - ASSESSMENT
Patient is a 56-year-old, female patient, PMHx: Hypertension, presented to the ED for Shortness of breath and found to have COVID19 PNA    #Acute Hypoxic Respiratory Failure  #COVID 19 PNA  Currently on % saturating 94-96, pt does not tolerate HFNC  s/p RDV therapy  Ddimers elevated to 33K on 03/16, LE duplex negative  s/p Levaquin x7d (03/12-03/19)  CTA 03/29: No PE  LE Duplex (04/01): b/l soleal vein thrombosis  - Cont decadron 6mg qD (Start 03/10), start taper  - Lovenox DVT PPX 40 BID  - Taper down oxygen requirements as tolerated   - Will give dose of lasix    #HTN  #Sinus tachycardia  - Metoprolol tartrate 25 BID    #DM2, new onset  Hba1c 6.9 (03/12)  - Lantus/lispro 20-3-3-3  - ISS  - Once downgraded, endocrine consult for outpt tx recommendations    #Progress Note Handoff  Pending (specify): Tapering off NRB and HFNC  Family discussion: d/w pt regarding tapering down O2 requirements  Disposition: Requiring HFNC, TBD   Patient is a 56-year-old, female patient, PMHx: Hypertension, presented to the ED for Shortness of breath and found to have COVID19 PNA    #Acute Hypoxic Respiratory Failure  #COVID 19 PNA  Currently on % saturating 94-96  s/p RDV therapy  Ddimers elevated to 33K on 03/16, LE duplex negative  s/p Levaquin x7d (03/12-03/19)  CTA 03/29: No PE  LE Duplex (04/01): b/l soleal vein thrombosis  - Cont decadron 6mg qD (Start 03/10), start taper  - Lovenox DVT PPX 40 BID  - Taper down oxygen requirements as tolerated, HF if pt tolerates  - f/u BNP in AM    #HTN  #Sinus tachycardia  - Metoprolol tartrate 25 BID    #DM2, new onset  Hba1c 6.9 (03/12)  - Lantus/lispro 20-3-3-3  - ISS  - Once downgraded, endocrine consult for outpt tx recommendations    #Progress Note Handoff  Pending (specify): Tapering off NRB and HFNC  Family discussion: d/w pt regarding tapering down O2 requirements  Disposition: Requiring HFNC, TBD

## 2021-04-01 NOTE — PROGRESS NOTE ADULT - ASSESSMENT
IMPRESSION:    Acute hypoxemic resp failure  Severe covid pneumonia SP TOCI and PLasma   Probable bacterial superinfection treated       PLAN:    CNS: Avoid CNS depressant    HEENT:  Oral care    PULMONARY:  HOB @ 45 degrees.  Wean O2 as tolerated. s/p dexa   try high flow while eating   repeat cxr   CARDIOVASCULAR: keep equal to negative balance.  One dose LAsix 40 mg today       GI: GI prophylaxis                                          Feeding po    RENAL:  F/u  lytes.  Correct as needed. accurate I/O    INFECTIOUS DISEASE: Monitor off ABX    HEMATOLOGICAL:  DVT prophylaxis.  Repeat LE doppler negative     ENDOCRINE:  Follow up FS.  Insulin protocol if needed.    CODE STATUS: FULL CODE    DISPOSITION: SDU    Prognosis guarded

## 2021-04-01 NOTE — PROGRESS NOTE ADULT - SUBJECTIVE AND OBJECTIVE BOX
Patient is a 56y old  Female who presents with a chief complaint of Shortness of breath (31 Mar 2021 12:20)      Over Night Events:  Patient seen and examined.   on nrb 100% not on distress    ROS:  See HPI    PHYSICAL EXAM    ICU Vital Signs Last 24 Hrs  T(C): 36.6 (01 Apr 2021 04:00), Max: 37.1 (31 Mar 2021 08:59)  T(F): 97.9 (01 Apr 2021 04:00), Max: 98.7 (31 Mar 2021 08:59)  HR: 82 (01 Apr 2021 04:00) (67 - 95)  BP: 114/56 (01 Apr 2021 04:00) (114/56 - 135/65)  BP(mean): --  ABP: --  ABP(mean): --  RR: 20 (01 Apr 2021 04:00) (20 - 22)  SpO2: 93% (01 Apr 2021 04:00) (93% - 98%)      General: awake   HEENT:  mary              Lymph Nodes: NO cervical LN   Lungs: Bilateral BS  Cardiovascular: Regular   Abdomen: Soft, Positive BS  Extremities: No clubbing   Skin: warm   Neurological: no focal   Musculoskeletal: move all ext     I&O's Detail    30 Mar 2021 07:01  -  31 Mar 2021 07:00  --------------------------------------------------------  IN:    Oral Fluid: 320 mL  Total IN: 320 mL    OUT:    Voided (mL): 300 mL  Total OUT: 300 mL    Total NET: 20 mL          LABS:                          12.5   10.84 )-----------( 261      ( 31 Mar 2021 06:46 )             40.9         31 Mar 2021 06:46    141    |  100    |  21     ----------------------------<  109    4.0     |  28     |  0.7      Ca    9.7        31 Mar 2021 06:46                                                                                                                                                                                                                                       MEDICATIONS  (STANDING):  acetaminophen   Tablet .. 650 milliGRAM(s) Oral once  chlorhexidine 4% Liquid 1 Application(s) Topical daily  enoxaparin Injectable 40 milliGRAM(s) SubCutaneous every 12 hours  insulin glargine Injectable (LANTUS) 20 Unit(s) SubCutaneous at bedtime  insulin lispro (ADMELOG) corrective regimen sliding scale   SubCutaneous three times a day before meals  insulin lispro Injectable (ADMELOG) 3 Unit(s) SubCutaneous three times a day before meals  metoprolol tartrate 25 milliGRAM(s) Oral two times a day  pantoprazole    Tablet 40 milliGRAM(s) Oral before breakfast    MEDICATIONS  (PRN):  acetaminophen   Tablet .. 650 milliGRAM(s) Oral every 6 hours PRN Temp greater or equal to 38C (100.4F), Mild Pain (1 - 3)  guaifenesin/dextromethorphan  Syrup 10 milliLiter(s) Oral every 6 hours PRN Cough          Xrays:  TLC:  OG:  ET tube:                                                                                       ECHO:  CAM ICU:

## 2021-04-01 NOTE — PROGRESS NOTE ADULT - SUBJECTIVE AND OBJECTIVE BOX
PAULA ELMORE  56y, Female  Allergy: Allergy Status Unknown    Hospital Day: 22d    Patient seen and examined earlier today. No acute events overnight.    PMH/PSH:  PAST MEDICAL & SURGICAL HISTORY:    VITALS:  T(F): 97.8 (04-01-21 @ 11:52), Max: 98.6 (03-31-21 @ 20:00)  HR: 72 (04-01-21 @ 11:52)  BP: 142/60 (04-01-21 @ 11:52) (114/56 - 142/60)  RR: 20 (04-01-21 @ 11:52)  SpO2: 94% (04-01-21 @ 11:52)    TESTS & MEASUREMENTS:  Weight (Kg):     03-30-21 @ 07:01  -  03-31-21 @ 07:00  --------------------------------------------------------  IN: 320 mL / OUT: 300 mL / NET: 20 mL    04-01-21 @ 07:01  -  04-01-21 @ 15:26  --------------------------------------------------------  IN: 550 mL / OUT: 1100 mL / NET: -550 mL                        12.5   10.84 )-----------( 261      ( 31 Mar 2021 06:46 )             40.9     03-31    141  |  100  |  21<H>  ----------------------------<  109<H>  4.0   |  28  |  0.7    Ca    9.7      31 Mar 2021 06:46    RECENT DIAGNOSTIC ORDERS:  Basic Metabolic Panel: AM Sched. Collection: 02-Apr-2021 04:30 (04-01-21 @ 11:40)  Complete Blood Count + Automated Diff: AM Sched. Collection: 02-Apr-2021 04:30 (04-01-21 @ 11:40)    MEDICATIONS:  MEDICATIONS  (STANDING):  acetaminophen   Tablet .. 650 milliGRAM(s) Oral once  chlorhexidine 4% Liquid 1 Application(s) Topical daily  dextrose 40% Gel 15 Gram(s) Oral once  dextrose 5%. 1000 milliLiter(s) (50 mL/Hr) IV Continuous <Continuous>  enoxaparin Injectable 40 milliGRAM(s) SubCutaneous two times a day  insulin glargine Injectable (LANTUS) 10 Unit(s) SubCutaneous at bedtime  insulin lispro (ADMELOG) corrective regimen sliding scale   SubCutaneous three times a day before meals  metoprolol tartrate 25 milliGRAM(s) Oral two times a day  pantoprazole    Tablet 40 milliGRAM(s) Oral before breakfast    MEDICATIONS  (PRN):  acetaminophen   Tablet .. 650 milliGRAM(s) Oral every 6 hours PRN Temp greater or equal to 38C (100.4F), Mild Pain (1 - 3)  guaifenesin/dextromethorphan  Syrup 10 milliLiter(s) Oral every 6 hours PRN Cough      HOME MEDICATIONS:      REVIEW OF SYSTEMS:  All other review of systems is negative unless indicated above.     PHYSICAL EXAM:  GENERAL: NAD  HEENT: No Swelling  CHEST/LUNG: Good air entry, No wheezing  HEART: RRR, No murmurs  ABDOMEN: Soft, Bowel sounds present  EXTREMITIES:  No clubbing

## 2021-04-01 NOTE — PROGRESS NOTE ADULT - ASSESSMENT
56 year old F with PMHx of HTN, admitted for acute hypoxemic respiratory failure due to COVID PNA.    #Acute hypoxemic respiratory failure secondary to COVID-19 pneumonia  - COVID-19 PCR positive  - Isolation precautions (contact, droplet, airborne)  - Chest X-ray 3/28 dec bilateral opacities, will repeat CXR today  - Patient is saturating ~% on NRB  - D-Dimer Assay, Quantitative: 2567 ng/mL DDU (03-28-21 @ 08:26)  - LE Duplex negative 3/23, CT Angio 3/29 negative for PE, will repeat duplex  - s/p RDV, dexamethasone, course of Levaquin  - DVT prophylaxis per protocol  - Titrate supplemental O2 to maintain SpO2 92-96%, will try to transition to HFNC if pt can tolerate    #HTN  #Sinus tachycardia  - C/w Metoprolol 25 mg BID    #DMII  - HbA1c 6.9%  - FS well controlled  - C/w Lantus 20 U, Lispro 3 U    DVT ppx: Lovenox 40 mg BID  GI ppx: PPI  Diet: CC  Activity: IAT  Full code  Dispo: acute, c/w CEU monitoring     56 year old F with PMHx of HTN, admitted for acute hypoxemic respiratory failure due to COVID PNA.    #Acute hypoxemic respiratory failure secondary to COVID-19 pneumonia  - COVID-19 PCR positive  - Isolation precautions (contact, droplet, airborne)  - Chest X-ray 3/28 dec bilateral opacities, will repeat CXR today  - Patient is saturating ~% on NRB  - D-Dimer Assay, Quantitative: 2567 ng/mL DDU (03-28-21 @ 08:26)  - LE Duplex negative 3/23, CT Angio 3/29 negative for PE  - LE duplex performed - per Vascular tech, pt has acute DVT in soleus v; will initiate Lovenox 100 mg BID  - s/p RDV, dexamethasone, course of Levaquin  - DVT prophylaxis per protocol  - Titrate supplemental O2 to maintain SpO2 92-96%, will try to transition to HFNC if pt can tolerate    #HTN  #Sinus tachycardia  - C/w Metoprolol 25 mg BID    #DMII  - HbA1c 6.9%  - FS well controlled  - C/w Lantus 20 U, Lispro 3 U    DVT ppx: Lovenox 40 mg BID  GI ppx: PPI  Diet: CC  Activity: IAT  Full code  Dispo: acute, c/w CEU monitoring     56 year old F with PMHx of HTN, admitted for acute hypoxemic respiratory failure due to COVID PNA.    #Acute hypoxemic respiratory failure secondary to COVID-19 pneumonia  - COVID-19 PCR positive  - Isolation precautions (contact, droplet, airborne)  - Chest X-ray 3/28 dec bilateral opacities, will repeat CXR today  - Patient is saturating ~% on NRB  - D-Dimer Assay, Quantitative: 2567 ng/mL DDU (03-28-21 @ 08:26)  - LE Duplex negative 3/23, CT Angio 3/29 negative for PE  - LE duplex performed - positive for acute DVTs in bilateral soleal veins, no deep DVTs  - s/p RDV, dexamethasone, course of Levaquin  - DVT prophylaxis per protocol  - Titrate supplemental O2 to maintain SpO2 92-96%, will try to transition to HFNC if pt can tolerate    #HTN  #Sinus tachycardia  - C/w Metoprolol 25 mg BID    #DMII  - HbA1c 6.9%  - FS well controlled  - C/w Lantus 20 U, Lispro 3 U    DVT ppx: Lovenox 40 mg BID  GI ppx: PPI  Diet: CC  Activity: IAT  Full code  Dispo: acute, c/w CEU monitoring

## 2021-04-02 LAB
ANION GAP SERPL CALC-SCNC: 13 MMOL/L — SIGNIFICANT CHANGE UP (ref 7–14)
BASOPHILS # BLD AUTO: 0.04 K/UL — SIGNIFICANT CHANGE UP (ref 0–0.2)
BASOPHILS NFR BLD AUTO: 0.4 % — SIGNIFICANT CHANGE UP (ref 0–1)
BUN SERPL-MCNC: 23 MG/DL — HIGH (ref 10–20)
CALCIUM SERPL-MCNC: 9.5 MG/DL — SIGNIFICANT CHANGE UP (ref 8.5–10.1)
CHLORIDE SERPL-SCNC: 99 MMOL/L — SIGNIFICANT CHANGE UP (ref 98–110)
CO2 SERPL-SCNC: 26 MMOL/L — SIGNIFICANT CHANGE UP (ref 17–32)
CREAT SERPL-MCNC: 0.7 MG/DL — SIGNIFICANT CHANGE UP (ref 0.7–1.5)
EOSINOPHIL # BLD AUTO: 0.42 K/UL — SIGNIFICANT CHANGE UP (ref 0–0.7)
EOSINOPHIL NFR BLD AUTO: 3.9 % — SIGNIFICANT CHANGE UP (ref 0–8)
GLUCOSE BLDC GLUCOMTR-MCNC: 106 MG/DL — HIGH (ref 70–99)
GLUCOSE BLDC GLUCOMTR-MCNC: 108 MG/DL — HIGH (ref 70–99)
GLUCOSE BLDC GLUCOMTR-MCNC: 127 MG/DL — HIGH (ref 70–99)
GLUCOSE SERPL-MCNC: 162 MG/DL — HIGH (ref 70–99)
HCT VFR BLD CALC: 42.4 % — SIGNIFICANT CHANGE UP (ref 37–47)
HGB BLD-MCNC: 13 G/DL — SIGNIFICANT CHANGE UP (ref 12–16)
IMM GRANULOCYTES NFR BLD AUTO: 0.9 % — HIGH (ref 0.1–0.3)
LYMPHOCYTES # BLD AUTO: 1.92 K/UL — SIGNIFICANT CHANGE UP (ref 1.2–3.4)
LYMPHOCYTES # BLD AUTO: 17.6 % — LOW (ref 20.5–51.1)
MCHC RBC-ENTMCNC: 28.3 PG — SIGNIFICANT CHANGE UP (ref 27–31)
MCHC RBC-ENTMCNC: 30.7 G/DL — LOW (ref 32–37)
MCV RBC AUTO: 92.4 FL — SIGNIFICANT CHANGE UP (ref 81–99)
MONOCYTES # BLD AUTO: 0.71 K/UL — HIGH (ref 0.1–0.6)
MONOCYTES NFR BLD AUTO: 6.5 % — SIGNIFICANT CHANGE UP (ref 1.7–9.3)
NEUTROPHILS # BLD AUTO: 7.7 K/UL — HIGH (ref 1.4–6.5)
NEUTROPHILS NFR BLD AUTO: 70.7 % — SIGNIFICANT CHANGE UP (ref 42.2–75.2)
NRBC # BLD: 0 /100 WBCS — SIGNIFICANT CHANGE UP (ref 0–0)
NT-PROBNP SERPL-SCNC: 61 PG/ML — SIGNIFICANT CHANGE UP (ref 0–300)
PLATELET # BLD AUTO: 247 K/UL — SIGNIFICANT CHANGE UP (ref 130–400)
POTASSIUM SERPL-MCNC: 3.9 MMOL/L — SIGNIFICANT CHANGE UP (ref 3.5–5)
POTASSIUM SERPL-SCNC: 3.9 MMOL/L — SIGNIFICANT CHANGE UP (ref 3.5–5)
RBC # BLD: 4.59 M/UL — SIGNIFICANT CHANGE UP (ref 4.2–5.4)
RBC # FLD: 13 % — SIGNIFICANT CHANGE UP (ref 11.5–14.5)
SODIUM SERPL-SCNC: 138 MMOL/L — SIGNIFICANT CHANGE UP (ref 135–146)
WBC # BLD: 10.89 K/UL — HIGH (ref 4.8–10.8)
WBC # FLD AUTO: 10.89 K/UL — HIGH (ref 4.8–10.8)

## 2021-04-02 PROCEDURE — 71045 X-RAY EXAM CHEST 1 VIEW: CPT | Mod: 26

## 2021-04-02 PROCEDURE — 99232 SBSQ HOSP IP/OBS MODERATE 35: CPT

## 2021-04-02 RX ORDER — POLYETHYLENE GLYCOL 3350 17 G/17G
17 POWDER, FOR SOLUTION ORAL DAILY
Refills: 0 | Status: DISCONTINUED | OUTPATIENT
Start: 2021-04-02 | End: 2021-04-09

## 2021-04-02 RX ORDER — SENNA PLUS 8.6 MG/1
2 TABLET ORAL AT BEDTIME
Refills: 0 | Status: DISCONTINUED | OUTPATIENT
Start: 2021-04-02 | End: 2021-04-09

## 2021-04-02 RX ADMIN — Medication 25 MILLIGRAM(S): at 17:13

## 2021-04-02 RX ADMIN — INSULIN GLARGINE 10 UNIT(S): 100 INJECTION, SOLUTION SUBCUTANEOUS at 22:06

## 2021-04-02 RX ADMIN — CHLORHEXIDINE GLUCONATE 1 APPLICATION(S): 213 SOLUTION TOPICAL at 06:00

## 2021-04-02 RX ADMIN — ENOXAPARIN SODIUM 40 MILLIGRAM(S): 100 INJECTION SUBCUTANEOUS at 06:00

## 2021-04-02 RX ADMIN — PANTOPRAZOLE SODIUM 40 MILLIGRAM(S): 20 TABLET, DELAYED RELEASE ORAL at 08:08

## 2021-04-02 RX ADMIN — ENOXAPARIN SODIUM 40 MILLIGRAM(S): 100 INJECTION SUBCUTANEOUS at 17:13

## 2021-04-02 NOTE — PROGRESS NOTE ADULT - SUBJECTIVE AND OBJECTIVE BOX
Patient is a 56y old  Female who presents with a chief complaint of Shortness of breath (01 Apr 2021 15:26)      Over Night Events:  Patient seen and examined.   on high flow 40%    ROS:  See HPI    PHYSICAL EXAM    ICU Vital Signs Last 24 Hrs  T(C): 36.4 (01 Apr 2021 16:00), Max: 36.6 (01 Apr 2021 11:52)  T(F): 97.6 (01 Apr 2021 16:00), Max: 97.8 (01 Apr 2021 11:52)  HR: 67 (01 Apr 2021 16:00) (61 - 72)  BP: 119/56 (01 Apr 2021 16:00) (118/56 - 142/60)  BP(mean): --  ABP: --  ABP(mean): --  RR: 20 (01 Apr 2021 16:00) (20 - 20)  SpO2: 97% (01 Apr 2021 16:00) (94% - 97%)      General: Aox3  HEENT:   mary             Lymph Nodes: NO cervical LN   Lungs: Bilateral BS  Cardiovascular: Regular   Abdomen: Soft, Positive BS  Extremities: No clubbing   Skin: warm   Neurological: no focal   Musculoskeletal: move all ext     I&O's Detail    01 Apr 2021 07:01  -  02 Apr 2021 06:49  --------------------------------------------------------  IN:    Oral Fluid: 850 mL  Total IN: 850 mL    OUT:    Voided (mL): 1100 mL  Total OUT: 1100 mL    Total NET: -250 mL          LABS:                                                                                                                                                                                                                                                 MEDICATIONS  (STANDING):  acetaminophen   Tablet .. 650 milliGRAM(s) Oral once  chlorhexidine 4% Liquid 1 Application(s) Topical daily  dextrose 40% Gel 15 Gram(s) Oral once  dextrose 5%. 1000 milliLiter(s) (50 mL/Hr) IV Continuous <Continuous>  enoxaparin Injectable 40 milliGRAM(s) SubCutaneous two times a day  insulin glargine Injectable (LANTUS) 10 Unit(s) SubCutaneous at bedtime  insulin lispro (ADMELOG) corrective regimen sliding scale   SubCutaneous three times a day before meals  metoprolol tartrate 25 milliGRAM(s) Oral two times a day  pantoprazole    Tablet 40 milliGRAM(s) Oral before breakfast    MEDICATIONS  (PRN):  acetaminophen   Tablet .. 650 milliGRAM(s) Oral every 6 hours PRN Temp greater or equal to 38C (100.4F), Mild Pain (1 - 3)  guaifenesin/dextromethorphan  Syrup 10 milliLiter(s) Oral every 6 hours PRN Cough          Xrays:  TLC:  OG:  ET tube:                                                                                       ECHO:  CAM ICU:

## 2021-04-02 NOTE — PROGRESS NOTE ADULT - SUBJECTIVE AND OBJECTIVE BOX
PAULA ELMORE  56y, Female  Allergy: Allergy Status Unknown    Hospital Day: 23d    Patient seen and examined earlier today. No acute events overnight.    PMH/PSH:  PAST MEDICAL & SURGICAL HISTORY:    VITALS:  T(F): 97.4 (04-02-21 @ 08:00), Max: 97.8 (04-01-21 @ 11:52)  HR: 82 (04-02-21 @ 08:00)  BP: 139/80 (04-02-21 @ 08:00) (119/56 - 142/60)  RR: 20 (04-02-21 @ 08:00)  SpO2: 94% (04-02-21 @ 08:35)    TESTS & MEASUREMENTS:  Weight (Kg):     04-01-21 @ 07:01  -  04-02-21 @ 07:00  --------------------------------------------------------  IN: 850 mL / OUT: 1100 mL / NET: -250 mL                       13.0   10.89 )-----------( 247      ( 02 Apr 2021 09:17 )             42.4     04-02    138  |  99  |  23<H>  ----------------------------<  162<H>  3.9   |  26  |  0.7    Ca    9.5      02 Apr 2021 09:17    RECENT DIAGNOSTIC ORDERS:  Xray Chest 1 View- PORTABLE-Routine: Routine   Indication: Resp. failure  Transport: Portable  Exam in Progress (04-02-21 @ 08:21)    MEDICATIONS:  MEDICATIONS  (STANDING):  acetaminophen   Tablet .. 650 milliGRAM(s) Oral once  chlorhexidine 4% Liquid 1 Application(s) Topical daily  dextrose 40% Gel 15 Gram(s) Oral once  dextrose 5%. 1000 milliLiter(s) (50 mL/Hr) IV Continuous <Continuous>  enoxaparin Injectable 40 milliGRAM(s) SubCutaneous two times a day  insulin glargine Injectable (LANTUS) 10 Unit(s) SubCutaneous at bedtime  insulin lispro (ADMELOG) corrective regimen sliding scale   SubCutaneous three times a day before meals  metoprolol tartrate 25 milliGRAM(s) Oral two times a day  pantoprazole    Tablet 40 milliGRAM(s) Oral before breakfast  senna 2 Tablet(s) Oral at bedtime    MEDICATIONS  (PRN):  acetaminophen   Tablet .. 650 milliGRAM(s) Oral every 6 hours PRN Temp greater or equal to 38C (100.4F), Mild Pain (1 - 3)  guaifenesin/dextromethorphan  Syrup 10 milliLiter(s) Oral every 6 hours PRN Cough  polyethylene glycol 3350 17 Gram(s) Oral daily PRN Constipation    HOME MEDICATIONS:    REVIEW OF SYSTEMS:  All other review of systems is negative unless indicated above.     PHYSICAL EXAM:  GENERAL: NAD  HEENT: No Swelling  CHEST/LUNG: Good air entry, No wheezing  HEART: RRR, No murmurs  ABDOMEN: Soft, Bowel sounds present  EXTREMITIES:  No clubbing

## 2021-04-02 NOTE — PROGRESS NOTE ADULT - SUBJECTIVE AND OBJECTIVE BOX
Patient Information:  PAULA ELMORE / 56y / Female / MRN#:[id number]    Hospital Day: 23d    Interval History:  Patient seen and examined at bedside. No acute events overnight. Pt remains on HFNC, states that she feels well, has been eating few amounts.    Past Medical History:    Past Surgical History:    Allergies:  Allergy Status Unknown    Medications:  PRN:  acetaminophen   Tablet .. 650 milliGRAM(s) Oral every 6 hours PRN Temp greater or equal to 38C (100.4F), Mild Pain (1 - 3)  guaifenesin/dextromethorphan  Syrup 10 milliLiter(s) Oral every 6 hours PRN Cough    Standing:  acetaminophen   Tablet .. 650 milliGRAM(s) Oral once  chlorhexidine 4% Liquid 1 Application(s) Topical daily  dextrose 40% Gel 15 Gram(s) Oral once  dextrose 5%. 1000 milliLiter(s) (50 mL/Hr) IV Continuous <Continuous>  enoxaparin Injectable 40 milliGRAM(s) SubCutaneous two times a day  insulin glargine Injectable (LANTUS) 10 Unit(s) SubCutaneous at bedtime  insulin lispro (ADMELOG) corrective regimen sliding scale   SubCutaneous three times a day before meals  metoprolol tartrate 25 milliGRAM(s) Oral two times a day  pantoprazole    Tablet 40 milliGRAM(s) Oral before breakfast    Home:    Vitals:  T(C): 36.3, Max: 36.6 (04-01-21 @ 11:52)  T(F): 97.4, Max: 97.8 (04-01-21 @ 11:52)  HR: 82 (67 - 82)  BP: 139/80 (119/56 - 142/60)  RR: 20 (20 - 20)  SpO2: 94% (94% - 97%)    Physical Exam:  General: Awake, alert, NAD, sitting up in bed on HFNC  HEENT: Head NC/AT  Heart: Regular rhythm, rate; loud systolic murmur best heard at LUSB  Lungs: Dec breath sounds in bilateral bases  Abdomen: Soft, nontender, nondistended, BS+  Extremities: No edema, clubbing, cyanosis in upper or lower extremities  Neuro: AAOx3, NFD                    Microbiology:    Radiology:

## 2021-04-02 NOTE — PROGRESS NOTE ADULT - ASSESSMENT
Patient is a 56-year-old, female patient, PMHx: Hypertension, presented to the ED for Shortness of breath and found to have COVID19 PNA    #Acute Hypoxic Respiratory Failure  #COVID 19 PNA  Currently on % saturating 94-96  s/p RDV therapy  Ddimers elevated to 33K on 03/16, LE duplex negative  s/p Levaquin x7d (03/12-03/19)  CTA 03/29: No PE  LE Duplex (04/01): b/l soleal vein thrombosis  BNP 04/02, 61  - Cont decadron 6mg qD (Start 03/10)  - Lovenox DVT PPX 40 BID  - Taper down oxygen requirements as tolerated, HF if pt tolerates    #HTN  #Sinus tachycardia  - Metoprolol tartrate 25 BID    #DM2, new onset  Hba1c 6.9 (03/12)  - Lantus/lispro 20-3-3-3  - ISS  - Once downgraded, endocrine consult for outpt tx recommendations    #Progress Note Handoff  Pending (specify): Tapering off HFNC  Family discussion: d/w pt regarding tapering down O2 requirements  Disposition: Requiring HFNC, TBD   Patient is a 56-year-old, female patient, PMHx: Hypertension, presented to the ED for Shortness of breath and found to have COVID19 PNA    #Acute Hypoxic Respiratory Failure  #COVID 19 PNA  Currently on HFNC 40/70% saturating 94-96  s/p RDV therapy  Ddimers elevated to 33K on 03/16, LE duplex negative  s/p Levaquin x7d (03/12-03/19)  CTA 03/29: No PE  LE Duplex (04/01): b/l soleal vein thrombosis  BNP 04/02, 61  - Cont decadron 6mg qD (Start 03/10)  - Lovenox DVT PPX 40 BID  - Taper down oxygen requirements as tolerated, HF if pt tolerates    #HTN  #Sinus tachycardia  - Metoprolol tartrate 25 BID    #DM2, new onset  Hba1c 6.9 (03/12)  - Lantus/lispro 20-3-3-3  - ISS  - Once downgraded, endocrine consult for outpt tx recommendations    #Progress Note Handoff  Pending (specify): Tapering off HFNC  Family discussion: d/w pt regarding tapering down O2 requirements  Disposition: Requiring HFNC, TBD

## 2021-04-02 NOTE — PROGRESS NOTE ADULT - ASSESSMENT
56 year old F with PMHx of HTN, admitted for acute hypoxemic respiratory failure due to COVID PNA.    #Acute hypoxemic respiratory failure secondary to COVID-19 pneumonia  - COVID-19 PCR positive  - Isolation precautions (contact, droplet, airborne)  - Chest X-ray 4/1 reveals inc bilateral opacities  - Patient is saturating ~94-96% on HFNC  - D-Dimer Assay, Quantitative: 2567 ng/mL DDU (03-28-21 @ 08:26)  - LE Duplex negative 3/23, CT Angio 3/29 negative for PE  - LE duplex performed - bilateral soleal vein thrombosis, no deep DVTs  - s/p RDV, dexamethasone, course of Levaquin  - DVT prophylaxis per protocol  - Titrate supplemental O2 to maintain SpO2 92-96%    #HTN  #Sinus tachycardia  - C/w Metoprolol 25 mg BID    #DMII  - HbA1c 6.9%  - FS well controlled  - C/w Lantus 20 U    DVT ppx: Lovenox 40 mg BID  GI ppx: PPI  Diet: CC  Activity: IAT  Full code  Dispo: acute, c/w CEU monitoring       56 year old F with PMHx of HTN, admitted for acute hypoxemic respiratory failure due to COVID PNA.    #Acute hypoxemic respiratory failure secondary to COVID-19 pneumonia  - COVID-19 PCR positive  - Isolation precautions (contact, droplet, airborne)  - Chest X-ray 4/1 reveals inc bilateral opacities  - Patient is saturating ~94-96% on HFNC  - D-Dimer Assay, Quantitative: 2567 ng/mL DDU (03-28-21 @ 08:26)  - LE Duplex negative 3/23, CT Angio 3/29 negative for PE  - LE duplex performed - bilateral soleal vein thrombosis, no deep DVTs  - s/p RDV, dexamethasone, course of Levaquin  - DVT prophylaxis per protocol  - Titrate supplemental O2 to maintain SpO2 92-96%    #HTN  #Sinus tachycardia  - C/w Metoprolol 25 mg BID    #DMII  - HbA1c 6.9%  - FS well controlled  - C/w Lantus 20 U    DVT ppx: Lovenox 40 mg BID  GI ppx: PPI  Diet: CC  Activity: IAT  Full code  Dispo: acute, c/w CEU monitoring    Spoke to , provided medical update and answered all questions.

## 2021-04-02 NOTE — PROGRESS NOTE ADULT - ASSESSMENT
IMPRESSION:    Acute hypoxemic resp failure  Severe covid pneumonia SP TOCI and PLasma   Probable bacterial superinfection treated       PLAN:    CNS: Avoid CNS depressant    HEENT:  Oral care    PULMONARY:  HOB @ 45 degrees.  Wean O2 as tolerated. s/p dexa   try high flow while eating   repeat cxr   CARDIOVASCULAR: keep equal to negative balance.  One dose LAsix 40 mg today       GI: GI prophylaxis                                          Feeding po    RENAL:  F/u  lytes.  Correct as needed. accurate I/O    INFECTIOUS DISEASE: Monitor off ABX    HEMATOLOGICAL:  DVT prophylaxis.      ENDOCRINE:  Follow up FS.  Insulin protocol if needed.    CODE STATUS: FULL CODE    DISPOSITION: SDU    Prognosis guarded

## 2021-04-03 LAB
ANION GAP SERPL CALC-SCNC: 12 MMOL/L — SIGNIFICANT CHANGE UP (ref 7–14)
BASOPHILS # BLD AUTO: 0.04 K/UL — SIGNIFICANT CHANGE UP (ref 0–0.2)
BASOPHILS NFR BLD AUTO: 0.4 % — SIGNIFICANT CHANGE UP (ref 0–1)
BUN SERPL-MCNC: 16 MG/DL — SIGNIFICANT CHANGE UP (ref 10–20)
CALCIUM SERPL-MCNC: 9.6 MG/DL — SIGNIFICANT CHANGE UP (ref 8.5–10.1)
CHLORIDE SERPL-SCNC: 100 MMOL/L — SIGNIFICANT CHANGE UP (ref 98–110)
CO2 SERPL-SCNC: 29 MMOL/L — SIGNIFICANT CHANGE UP (ref 17–32)
CREAT SERPL-MCNC: 0.7 MG/DL — SIGNIFICANT CHANGE UP (ref 0.7–1.5)
EOSINOPHIL # BLD AUTO: 0.58 K/UL — SIGNIFICANT CHANGE UP (ref 0–0.7)
EOSINOPHIL NFR BLD AUTO: 6.3 % — SIGNIFICANT CHANGE UP (ref 0–8)
GLUCOSE BLDC GLUCOMTR-MCNC: 102 MG/DL — HIGH (ref 70–99)
GLUCOSE BLDC GLUCOMTR-MCNC: 106 MG/DL — HIGH (ref 70–99)
GLUCOSE BLDC GLUCOMTR-MCNC: 107 MG/DL — HIGH (ref 70–99)
GLUCOSE BLDC GLUCOMTR-MCNC: 111 MG/DL — HIGH (ref 70–99)
GLUCOSE BLDC GLUCOMTR-MCNC: 112 MG/DL — HIGH (ref 70–99)
GLUCOSE SERPL-MCNC: 136 MG/DL — HIGH (ref 70–99)
HCT VFR BLD CALC: 40.8 % — SIGNIFICANT CHANGE UP (ref 37–47)
HGB BLD-MCNC: 12.6 G/DL — SIGNIFICANT CHANGE UP (ref 12–16)
IMM GRANULOCYTES NFR BLD AUTO: 1.2 % — HIGH (ref 0.1–0.3)
LYMPHOCYTES # BLD AUTO: 1.78 K/UL — SIGNIFICANT CHANGE UP (ref 1.2–3.4)
LYMPHOCYTES # BLD AUTO: 19.4 % — LOW (ref 20.5–51.1)
MCHC RBC-ENTMCNC: 28.4 PG — SIGNIFICANT CHANGE UP (ref 27–31)
MCHC RBC-ENTMCNC: 30.9 G/DL — LOW (ref 32–37)
MCV RBC AUTO: 91.9 FL — SIGNIFICANT CHANGE UP (ref 81–99)
MONOCYTES # BLD AUTO: 0.55 K/UL — SIGNIFICANT CHANGE UP (ref 0.1–0.6)
MONOCYTES NFR BLD AUTO: 6 % — SIGNIFICANT CHANGE UP (ref 1.7–9.3)
NEUTROPHILS # BLD AUTO: 6.11 K/UL — SIGNIFICANT CHANGE UP (ref 1.4–6.5)
NEUTROPHILS NFR BLD AUTO: 66.7 % — SIGNIFICANT CHANGE UP (ref 42.2–75.2)
NRBC # BLD: 0 /100 WBCS — SIGNIFICANT CHANGE UP (ref 0–0)
PLATELET # BLD AUTO: 249 K/UL — SIGNIFICANT CHANGE UP (ref 130–400)
POTASSIUM SERPL-MCNC: 4.1 MMOL/L — SIGNIFICANT CHANGE UP (ref 3.5–5)
POTASSIUM SERPL-SCNC: 4.1 MMOL/L — SIGNIFICANT CHANGE UP (ref 3.5–5)
RBC # BLD: 4.44 M/UL — SIGNIFICANT CHANGE UP (ref 4.2–5.4)
RBC # FLD: 12.9 % — SIGNIFICANT CHANGE UP (ref 11.5–14.5)
SODIUM SERPL-SCNC: 141 MMOL/L — SIGNIFICANT CHANGE UP (ref 135–146)
WBC # BLD: 9.17 K/UL — SIGNIFICANT CHANGE UP (ref 4.8–10.8)
WBC # FLD AUTO: 9.17 K/UL — SIGNIFICANT CHANGE UP (ref 4.8–10.8)

## 2021-04-03 PROCEDURE — 99232 SBSQ HOSP IP/OBS MODERATE 35: CPT

## 2021-04-03 RX ADMIN — Medication 25 MILLIGRAM(S): at 05:39

## 2021-04-03 RX ADMIN — ENOXAPARIN SODIUM 40 MILLIGRAM(S): 100 INJECTION SUBCUTANEOUS at 05:39

## 2021-04-03 RX ADMIN — SENNA PLUS 2 TABLET(S): 8.6 TABLET ORAL at 21:38

## 2021-04-03 RX ADMIN — PANTOPRAZOLE SODIUM 40 MILLIGRAM(S): 20 TABLET, DELAYED RELEASE ORAL at 05:40

## 2021-04-03 RX ADMIN — ENOXAPARIN SODIUM 40 MILLIGRAM(S): 100 INJECTION SUBCUTANEOUS at 17:59

## 2021-04-03 RX ADMIN — CHLORHEXIDINE GLUCONATE 1 APPLICATION(S): 213 SOLUTION TOPICAL at 05:38

## 2021-04-03 RX ADMIN — INSULIN GLARGINE 10 UNIT(S): 100 INJECTION, SOLUTION SUBCUTANEOUS at 21:38

## 2021-04-03 RX ADMIN — Medication 25 MILLIGRAM(S): at 17:59

## 2021-04-03 NOTE — PROGRESS NOTE ADULT - ASSESSMENT
IMPRESSION:    Acute hypoxemic resp failure  Severe covid pneumonia SP TOCI and PLasma   Probable bacterial superinfection treated       PLAN:    CNS: Avoid CNS depressant    HEENT:  Oral care    PULMONARY:  HOB @ 45 degrees.  Wean O2 as tolerated. s/p dexa   taper to NC 6 liter if tolerate keep pox > 92%  CARDIOVASCULAR: keep equal to negative balance.      GI: GI prophylaxis                                          Feeding po    RENAL:  F/u  lytes.  Correct as needed. accurate I/O    INFECTIOUS DISEASE: Monitor off ABX    HEMATOLOGICAL:  DVT prophylaxis.      ENDOCRINE:  Follow up FS.  Insulin protocol if needed.    CODE STATUS: FULL CODE    DISPOSITION: SDU    Prognosis guarded

## 2021-04-03 NOTE — PROGRESS NOTE ADULT - SUBJECTIVE AND OBJECTIVE BOX
Patient Information:  PAULA SUMO / 56y / Female / MRN#:[id number]    Hospital Day: 24d    Interval History:  Patient seen and examined at bedside. No acute events overnight. Pt reports she feels better with HFNC. Wishes to walk around in room.    Past Medical History:    Past Surgical History:    Allergies:  Allergy Status Unknown    Medications:  PRN:  acetaminophen   Tablet .. 650 milliGRAM(s) Oral every 6 hours PRN Temp greater or equal to 38C (100.4F), Mild Pain (1 - 3)  guaifenesin/dextromethorphan  Syrup 10 milliLiter(s) Oral every 6 hours PRN Cough  polyethylene glycol 3350 17 Gram(s) Oral daily PRN Constipation    Standing:  chlorhexidine 4% Liquid 1 Application(s) Topical daily  dextrose 40% Gel 15 Gram(s) Oral once  dextrose 5%. 1000 milliLiter(s) (50 mL/Hr) IV Continuous <Continuous>  enoxaparin Injectable 40 milliGRAM(s) SubCutaneous two times a day  insulin glargine Injectable (LANTUS) 10 Unit(s) SubCutaneous at bedtime  insulin lispro (ADMELOG) corrective regimen sliding scale   SubCutaneous three times a day before meals  metoprolol tartrate 25 milliGRAM(s) Oral two times a day  pantoprazole    Tablet 40 milliGRAM(s) Oral before breakfast  senna 2 Tablet(s) Oral at bedtime    Home:    Vitals:  T(C): 36.1, Max: 36.2 (04-02-21 @ 17:03)  T(F): 97, Max: 97.1 (04-02-21 @ 17:03)  HR: 80 (80 - 84)  BP: 132/64 (132/64 - 136/74)  RR: 18 (18 - 20)  SpO2: 90% (90% - 95%)    Physical Exam:  General: Awake, alert, NAD, sitting up in bed on HFNC  HEENT: Head NC/AT  Heart: Regular rhythm, rate; loud systolic murmur best heard at LUSB  Lungs: Dec breath sounds in bilateral bases  Abdomen: Soft, nontender, nondistended, BS+  Extremities: No edema, clubbing, cyanosis in upper or lower extremities  Neuro: AAOx3, NFD    Labs:  CBC (04-02 @ 09:17)                        Hgb: 13.0   WBC: 10.89 )-----------------( Plts: 247                              Hct: 42.4     Chem (04-02 @ 09:17)  Na: 138  |     Cl: 99     |  BUN: 23  -----------------------------------------< Gluc: 162    K: 3.9   |    HCO3: 26  |  Cr: 0.7    Ca 9.5 (04-02 @ 09:17)

## 2021-04-03 NOTE — PROGRESS NOTE ADULT - SUBJECTIVE AND OBJECTIVE BOX
PAULA ELMORE  56y, Female  Allergy: Allergy Status Unknown    Hospital Day: 24d    Patient seen and examined earlier today. No acute events overnight.    PMH/PSH:  PAST MEDICAL & SURGICAL HISTORY:    VITALS:  T(F): 97 (04-02-21 @ 19:54), Max: 97.1 (04-02-21 @ 17:03)  HR: 80 (04-02-21 @ 19:54)  BP: 132/64 (04-02-21 @ 19:54) (132/64 - 136/74)  RR: 18 (04-02-21 @ 19:54)  SpO2: 100% (04-03-21 @ 07:45)    TESTS & MEASUREMENTS:  Weight (Kg):     04-01-21 @ 07:01  -  04-02-21 @ 07:00  --------------------------------------------------------  IN: 850 mL / OUT: 1100 mL / NET: -250 mL    04-02-21 @ 07:01  -  04-03-21 @ 07:00  --------------------------------------------------------  IN: 100 mL / OUT: 600 mL / NET: -500 mL                        13.0   10.89 )-----------( 247      ( 02 Apr 2021 09:17 )             42.4     04-02    138  |  99  |  23<H>  ----------------------------<  162<H>  3.9   |  26  |  0.7    Ca    9.5      02 Apr 2021 09:17    RECENT DIAGNOSTIC ORDERS:  Basic Metabolic Panel: AM Sched. Collection: 03-Apr-2021 04:30 (04-02-21 @ 11:32)  Complete Blood Count + Automated Diff: AM Sched. Collection: 03-Apr-2021 04:30 (04-02-21 @ 11:32)  Basic Metabolic Panel: Repeat From: 02-Apr-2021 11:32 To: 05-Apr-2021 04:30, Every 1 day(s) (04-02-21 @ 11:32)  Complete Blood Count + Automated Diff: Repeat From: 02-Apr-2021 11:32 To: 05-Apr-2021 04:30, Every 1 day(s) (04-02-21 @ 11:32)    MEDICATIONS:  MEDICATIONS  (STANDING):  chlorhexidine 4% Liquid 1 Application(s) Topical daily  dextrose 40% Gel 15 Gram(s) Oral once  dextrose 5%. 1000 milliLiter(s) (50 mL/Hr) IV Continuous <Continuous>  enoxaparin Injectable 40 milliGRAM(s) SubCutaneous two times a day  insulin glargine Injectable (LANTUS) 10 Unit(s) SubCutaneous at bedtime  insulin lispro (ADMELOG) corrective regimen sliding scale   SubCutaneous three times a day before meals  metoprolol tartrate 25 milliGRAM(s) Oral two times a day  pantoprazole    Tablet 40 milliGRAM(s) Oral before breakfast  senna 2 Tablet(s) Oral at bedtime    MEDICATIONS  (PRN):  acetaminophen   Tablet .. 650 milliGRAM(s) Oral every 6 hours PRN Temp greater or equal to 38C (100.4F), Mild Pain (1 - 3)  guaifenesin/dextromethorphan  Syrup 10 milliLiter(s) Oral every 6 hours PRN Cough  polyethylene glycol 3350 17 Gram(s) Oral daily PRN Constipation    HOME MEDICATIONS:    REVIEW OF SYSTEMS:  All other review of systems is negative unless indicated above.     PHYSICAL EXAM:  GENERAL: NAD  HEENT: No Swelling  CHEST/LUNG: Good air entry, No wheezing  HEART: RRR, No murmurs  ABDOMEN: Soft, Bowel sounds present  EXTREMITIES:  No clubbing

## 2021-04-03 NOTE — PROGRESS NOTE ADULT - SUBJECTIVE AND OBJECTIVE BOX
Patient is a 56y old  Female who presents with a chief complaint of Shortness of breath (02 Apr 2021 10:45)      Over Night Events:  Patient seen and examined.   still on high feel good     ROS:  See HPI    PHYSICAL EXAM    ICU Vital Signs Last 24 Hrs  T(C): 36.1 (02 Apr 2021 19:54), Max: 36.2 (02 Apr 2021 17:03)  T(F): 97 (02 Apr 2021 19:54), Max: 97.1 (02 Apr 2021 17:03)  HR: 80 (02 Apr 2021 19:54) (80 - 84)  BP: 132/64 (02 Apr 2021 19:54) (132/64 - 136/74)  BP(mean): 72 (02 Apr 2021 19:54) (72 - 72)  ABP: --  ABP(mean): --  RR: 18 (02 Apr 2021 19:54) (18 - 20)  SpO2: 90% (02 Apr 2021 19:54) (90% - 95%)      General: Aox3   HEENT:       mary         Lymph Nodes: NO cervical LN   Lungs: Bilateral BS  Cardiovascular: Regular   Abdomen: Soft, Positive BS  Extremities: No clubbing   Skin: warm   Neurological: no focal  Musculoskeletal: move all ext     I&O's Detail    02 Apr 2021 07:01  -  03 Apr 2021 07:00  --------------------------------------------------------  IN:    Oral Fluid: 100 mL  Total IN: 100 mL    OUT:    Voided (mL): 600 mL  Total OUT: 600 mL    Total NET: -500 mL          LABS:                          13.0   10.89 )-----------( 247      ( 02 Apr 2021 09:17 )             42.4         02 Apr 2021 09:17    138    |  99     |  23     ----------------------------<  162    3.9     |  26     |  0.7      Ca    9.5        02 Apr 2021 09:17                                                                                                                                                                                                                                       MEDICATIONS  (STANDING):  chlorhexidine 4% Liquid 1 Application(s) Topical daily  dextrose 40% Gel 15 Gram(s) Oral once  dextrose 5%. 1000 milliLiter(s) (50 mL/Hr) IV Continuous <Continuous>  enoxaparin Injectable 40 milliGRAM(s) SubCutaneous two times a day  insulin glargine Injectable (LANTUS) 10 Unit(s) SubCutaneous at bedtime  insulin lispro (ADMELOG) corrective regimen sliding scale   SubCutaneous three times a day before meals  metoprolol tartrate 25 milliGRAM(s) Oral two times a day  pantoprazole    Tablet 40 milliGRAM(s) Oral before breakfast  senna 2 Tablet(s) Oral at bedtime    MEDICATIONS  (PRN):  acetaminophen   Tablet .. 650 milliGRAM(s) Oral every 6 hours PRN Temp greater or equal to 38C (100.4F), Mild Pain (1 - 3)  guaifenesin/dextromethorphan  Syrup 10 milliLiter(s) Oral every 6 hours PRN Cough  polyethylene glycol 3350 17 Gram(s) Oral daily PRN Constipation          Xrays:  TLC:  OG:  ET tube:                                                                                       ECHO:  CAM ICU:

## 2021-04-03 NOTE — PROGRESS NOTE ADULT - ASSESSMENT
Patient is a 56-year-old, female patient, PMHx: Hypertension, presented to the ED for Shortness of breath and found to have COVID19 PNA    #Acute Hypoxic Respiratory Failure  #COVID 19 PNA  Currently on HFNC 40/60% saturating 98%  s/p RDV therapy  Ddimers elevated to 33K on 03/16, LE duplex negative  s/p Levaquin x7d (03/12-03/19)  CTA 03/29: No PE  LE Duplex (04/01): b/l soleal vein thrombosis  BNP 04/02, 61  s/p decadron (End 03/31)  - Lovenox DVT PPX 40 BID  - Taper down oxygen requirements as tolerated  - Monitor I/Os, keep equal to negative balance    #HTN  #Sinus tachycardia  - Metoprolol tartrate 25 BID    #DM2, new onset  Hba1c 6.9 (03/12)  - Lantus/lispro 20-3-3-3  - ISS  - Once downgraded, endocrine consult for outpt tx recommendations    #Progress Note Handoff  Pending (specify): Tapering off HFNC  Family discussion: d/w pt regarding tapering down O2 requirements  Disposition: Requiring HFNC, TBD

## 2021-04-03 NOTE — PROGRESS NOTE ADULT - ASSESSMENT
56 year old F with PMHx of HTN, admitted for acute hypoxemic respiratory failure due to COVID PNA.    #Acute hypoxemic respiratory failure secondary to COVID-19 pneumonia  - COVID-19 PCR positive  - Isolation precautions (contact, droplet, airborne)  - Chest X-ray 4/2 reveals inc bilateral opacities  - Patient is saturating ~90-95% on HFNC 40L/60%  - LE Duplex negative 3/23, CT Angio 3/29 negative for PE  - Repeat LE duplex performed - bilateral soleal vein thrombosis, no deep DVTs  - s/p RDV, dexamethasone, course of Levaquin  - DVT prophylaxis per protocol  - Titrate supplemental O2 to maintain SpO2 92-96%    #HTN  #Sinus tachycardia  - C/w Metoprolol 25 mg BID    #DMII  - HbA1c 6.9%  - FS well controlled  - C/w Lantus 20 U    DVT ppx: Lovenox 40 mg BID  GI ppx: PPI  Diet: CC  Activity: IAT  Full code  Dispo: acute, c/w CEU monitoring, wean down HFNC as tolerated    56 year old F with PMHx of HTN, admitted for acute hypoxemic respiratory failure due to COVID PNA.    #Acute hypoxemic respiratory failure secondary to COVID-19 pneumonia  - COVID-19 PCR positive  - Isolation precautions (contact, droplet, airborne)  - Chest X-ray 4/2 reveals inc bilateral opacities  - Patient is saturating ~90-95% on HFNC 40L/60%  - LE Duplex negative 3/23, CT Angio 3/29 negative for PE  - Repeat LE duplex performed - bilateral soleal vein thrombosis, no deep DVTs  - s/p RDV, dexamethasone, course of Levaquin  - DVT prophylaxis per protocol  - Titrate supplemental O2 to maintain SpO2 92-96%    #HTN  #Sinus tachycardia  - C/w Metoprolol 25 mg BID    #DMII  - HbA1c 6.9%  - FS well controlled  - C/w Lantus 20 U    DVT ppx: Lovenox 40 mg BID  GI ppx: PPI  Diet: CC  Activity: IAT  Full code  Dispo: acute, c/w CEU monitoring, wean down HFNC as tolerated       Spoke to , provided medical update and answered all questions.

## 2021-04-04 LAB
ANION GAP SERPL CALC-SCNC: 10 MMOL/L — SIGNIFICANT CHANGE UP (ref 7–14)
BASOPHILS # BLD AUTO: 0.06 K/UL — SIGNIFICANT CHANGE UP (ref 0–0.2)
BASOPHILS NFR BLD AUTO: 0.6 % — SIGNIFICANT CHANGE UP (ref 0–1)
BUN SERPL-MCNC: 14 MG/DL — SIGNIFICANT CHANGE UP (ref 10–20)
CALCIUM SERPL-MCNC: 9.2 MG/DL — SIGNIFICANT CHANGE UP (ref 8.5–10.1)
CHLORIDE SERPL-SCNC: 100 MMOL/L — SIGNIFICANT CHANGE UP (ref 98–110)
CO2 SERPL-SCNC: 29 MMOL/L — SIGNIFICANT CHANGE UP (ref 17–32)
CREAT SERPL-MCNC: 0.7 MG/DL — SIGNIFICANT CHANGE UP (ref 0.7–1.5)
EOSINOPHIL # BLD AUTO: 0.75 K/UL — HIGH (ref 0–0.7)
EOSINOPHIL NFR BLD AUTO: 8 % — SIGNIFICANT CHANGE UP (ref 0–8)
GLUCOSE BLDC GLUCOMTR-MCNC: 106 MG/DL — HIGH (ref 70–99)
GLUCOSE BLDC GLUCOMTR-MCNC: 132 MG/DL — HIGH (ref 70–99)
GLUCOSE BLDC GLUCOMTR-MCNC: 86 MG/DL — SIGNIFICANT CHANGE UP (ref 70–99)
GLUCOSE BLDC GLUCOMTR-MCNC: 99 MG/DL — SIGNIFICANT CHANGE UP (ref 70–99)
GLUCOSE SERPL-MCNC: 92 MG/DL — SIGNIFICANT CHANGE UP (ref 70–99)
HCT VFR BLD CALC: 38.5 % — SIGNIFICANT CHANGE UP (ref 37–47)
HGB BLD-MCNC: 12 G/DL — SIGNIFICANT CHANGE UP (ref 12–16)
IMM GRANULOCYTES NFR BLD AUTO: 1.2 % — HIGH (ref 0.1–0.3)
LYMPHOCYTES # BLD AUTO: 1.86 K/UL — SIGNIFICANT CHANGE UP (ref 1.2–3.4)
LYMPHOCYTES # BLD AUTO: 19.9 % — LOW (ref 20.5–51.1)
MCHC RBC-ENTMCNC: 28.6 PG — SIGNIFICANT CHANGE UP (ref 27–31)
MCHC RBC-ENTMCNC: 31.2 G/DL — LOW (ref 32–37)
MCV RBC AUTO: 91.9 FL — SIGNIFICANT CHANGE UP (ref 81–99)
MONOCYTES # BLD AUTO: 0.62 K/UL — HIGH (ref 0.1–0.6)
MONOCYTES NFR BLD AUTO: 6.6 % — SIGNIFICANT CHANGE UP (ref 1.7–9.3)
NEUTROPHILS # BLD AUTO: 5.94 K/UL — SIGNIFICANT CHANGE UP (ref 1.4–6.5)
NEUTROPHILS NFR BLD AUTO: 63.7 % — SIGNIFICANT CHANGE UP (ref 42.2–75.2)
NRBC # BLD: 0 /100 WBCS — SIGNIFICANT CHANGE UP (ref 0–0)
PLATELET # BLD AUTO: 247 K/UL — SIGNIFICANT CHANGE UP (ref 130–400)
POTASSIUM SERPL-MCNC: 4 MMOL/L — SIGNIFICANT CHANGE UP (ref 3.5–5)
POTASSIUM SERPL-SCNC: 4 MMOL/L — SIGNIFICANT CHANGE UP (ref 3.5–5)
RBC # BLD: 4.19 M/UL — LOW (ref 4.2–5.4)
RBC # FLD: 12.8 % — SIGNIFICANT CHANGE UP (ref 11.5–14.5)
SODIUM SERPL-SCNC: 139 MMOL/L — SIGNIFICANT CHANGE UP (ref 135–146)
WBC # BLD: 9.34 K/UL — SIGNIFICANT CHANGE UP (ref 4.8–10.8)
WBC # FLD AUTO: 9.34 K/UL — SIGNIFICANT CHANGE UP (ref 4.8–10.8)

## 2021-04-04 PROCEDURE — 99232 SBSQ HOSP IP/OBS MODERATE 35: CPT

## 2021-04-04 RX ADMIN — ENOXAPARIN SODIUM 40 MILLIGRAM(S): 100 INJECTION SUBCUTANEOUS at 06:09

## 2021-04-04 RX ADMIN — Medication 25 MILLIGRAM(S): at 06:09

## 2021-04-04 RX ADMIN — ENOXAPARIN SODIUM 40 MILLIGRAM(S): 100 INJECTION SUBCUTANEOUS at 17:10

## 2021-04-04 RX ADMIN — Medication 25 MILLIGRAM(S): at 17:10

## 2021-04-04 RX ADMIN — PANTOPRAZOLE SODIUM 40 MILLIGRAM(S): 20 TABLET, DELAYED RELEASE ORAL at 06:09

## 2021-04-04 RX ADMIN — INSULIN GLARGINE 10 UNIT(S): 100 INJECTION, SOLUTION SUBCUTANEOUS at 23:10

## 2021-04-04 NOTE — PROGRESS NOTE ADULT - SUBJECTIVE AND OBJECTIVE BOX
Patient is a 56y old  Female who presents with a chief complaint of Shortness of breath (03 Apr 2021 09:54)      Over Night Events:  Patient seen and examined.   on high flow 505    ROS:  See HPI    PHYSICAL EXAM    ICU Vital Signs Last 24 Hrs  T(C): 36.8 (04 Apr 2021 04:05), Max: 37.4 (03 Apr 2021 23:56)  T(F): 98.3 (04 Apr 2021 04:05), Max: 99.3 (03 Apr 2021 23:56)  HR: 76 (04 Apr 2021 04:05) (74 - 82)  BP: 126/56 (04 Apr 2021 04:05) (104/53 - 145/64)  BP(mean): 91 (03 Apr 2021 16:00) (91 - 91)  ABP: --  ABP(mean): --  RR: 20 (04 Apr 2021 04:05) (18 - 21)  SpO2: 98% (04 Apr 2021 04:05) (95% - 99%)      General: AO  HEENT:      mary          Lymph Nodes: NO cervical LN   Lungs: Bilateral BS  Cardiovascular: Regular   Abdomen: Soft, Positive BS  Extremities: No clubbing   Skin: warm   Neurological: no focal   Musculoskeletal: move all ext     I&O's Detail    03 Apr 2021 07:01  -  04 Apr 2021 07:00  --------------------------------------------------------  IN:    Oral Fluid: 420 mL  Total IN: 420 mL    OUT:    Voided (mL): 300 mL  Total OUT: 300 mL    Total NET: 120 mL          LABS:                          12.6   9.17  )-----------( 249      ( 03 Apr 2021 09:02 )             40.8         03 Apr 2021 09:02    141    |  100    |  16     ----------------------------<  136    4.1     |  29     |  0.7      Ca    9.6        03 Apr 2021 09:02                                                                                                                                                                                                                                       MEDICATIONS  (STANDING):  chlorhexidine 4% Liquid 1 Application(s) Topical daily  dextrose 40% Gel 15 Gram(s) Oral once  dextrose 5%. 1000 milliLiter(s) (50 mL/Hr) IV Continuous <Continuous>  enoxaparin Injectable 40 milliGRAM(s) SubCutaneous two times a day  insulin glargine Injectable (LANTUS) 10 Unit(s) SubCutaneous at bedtime  insulin lispro (ADMELOG) corrective regimen sliding scale   SubCutaneous three times a day before meals  metoprolol tartrate 25 milliGRAM(s) Oral two times a day  pantoprazole    Tablet 40 milliGRAM(s) Oral before breakfast  senna 2 Tablet(s) Oral at bedtime    MEDICATIONS  (PRN):  acetaminophen   Tablet .. 650 milliGRAM(s) Oral every 6 hours PRN Temp greater or equal to 38C (100.4F), Mild Pain (1 - 3)  guaifenesin/dextromethorphan  Syrup 10 milliLiter(s) Oral every 6 hours PRN Cough  polyethylene glycol 3350 17 Gram(s) Oral daily PRN Constipation          Xrays:  TLC:  OG:  ET tube:                                                                                       ECHO:  [facility] ICU:

## 2021-04-04 NOTE — PROGRESS NOTE ADULT - ASSESSMENT
Patient is a 56-year-old, female patient, PMHx: Hypertension, presented to the ED for Shortness of breath and found to have COVID19 PNA    #Acute Hypoxic Respiratory Failure  #COVID 19 PNA  Currently on HFNC 40/50% saturating 95%  s/p RDV therapy  Ddimers elevated to 33K on 03/16, LE duplex negative  s/p Levaquin x7d (03/12-03/19)  CTA 03/29: No PE  LE Duplex (04/01): b/l soleal vein thrombosis  BNP 04/02, 61  s/p decadron (End 03/31)  - Lovenox DVT PPX 40 BID  - Taper down oxygen requirements as tolerated  - Monitor I/Os, keep equal to negative balance    #HTN  #Sinus tachycardia  - Metoprolol tartrate 25 BID    #DM2, new onset  Hba1c 6.9 (03/12)  - Lantus/lispro 20-3-3-3  - ISS  - Once downgraded, endocrine consult for outpt tx recommendations    #Progress Note Handoff  Pending (specify): Tapering off HFNC  Family discussion: d/w pt regarding tapering down O2 requirements  Disposition: Requiring HFNC, TBD

## 2021-04-04 NOTE — PROGRESS NOTE ADULT - SUBJECTIVE AND OBJECTIVE BOX
PAULA ELMORE  56y, Female  Allergy: Allergy Status Unknown    Hospital Day: 25d    Patient seen and examined earlier today. No acute events overnight.    PMH/PSH:  PAST MEDICAL & SURGICAL HISTORY:    VITALS:  T(F): 99.1 (04-04-21 @ 08:00), Max: 99.3 (04-03-21 @ 23:56)  HR: 79 (04-04-21 @ 08:00)  BP: 140/65 (04-04-21 @ 08:00) (104/53 - 145/64)  RR: 20 (04-04-21 @ 08:00)  SpO2: 92% (04-04-21 @ 08:00)    TESTS & MEASUREMENTS:  Weight (Kg):     04-02-21 @ 07:01  -  04-03-21 @ 07:00  --------------------------------------------------------  IN: 100 mL / OUT: 600 mL / NET: -500 mL    04-03-21 @ 07:01  -  04-04-21 @ 07:00  --------------------------------------------------------  IN: 420 mL / OUT: 300 mL / NET: 120 mL                        12.6   9.17  )-----------( 249      ( 03 Apr 2021 09:02 )             40.8     04-03    141  |  100  |  16  ----------------------------<  136<H>  4.1   |  29  |  0.7    Ca    9.6      03 Apr 2021 09:02    RECENT DIAGNOSTIC ORDERS:  Basic Metabolic Panel: AM Sched. Collection: 04-Apr-2021 04:30 (04-04-21 @ 00:02)  Complete Blood Count + Automated Diff: AM Sched. Collection: 04-Apr-2021 04:30 (04-04-21 @ 00:02)    MEDICATIONS:  MEDICATIONS  (STANDING):  chlorhexidine 4% Liquid 1 Application(s) Topical daily  dextrose 40% Gel 15 Gram(s) Oral once  dextrose 5%. 1000 milliLiter(s) (50 mL/Hr) IV Continuous <Continuous>  enoxaparin Injectable 40 milliGRAM(s) SubCutaneous two times a day  insulin glargine Injectable (LANTUS) 10 Unit(s) SubCutaneous at bedtime  insulin lispro (ADMELOG) corrective regimen sliding scale   SubCutaneous three times a day before meals  metoprolol tartrate 25 milliGRAM(s) Oral two times a day  pantoprazole    Tablet 40 milliGRAM(s) Oral before breakfast  senna 2 Tablet(s) Oral at bedtime    MEDICATIONS  (PRN):  acetaminophen   Tablet .. 650 milliGRAM(s) Oral every 6 hours PRN Temp greater or equal to 38C (100.4F), Mild Pain (1 - 3)  guaifenesin/dextromethorphan  Syrup 10 milliLiter(s) Oral every 6 hours PRN Cough  polyethylene glycol 3350 17 Gram(s) Oral daily PRN Constipation      HOME MEDICATIONS:      REVIEW OF SYSTEMS:  All other review of systems is negative unless indicated above.     PHYSICAL EXAM:  GENERAL: NAD  HEENT: No Swelling  CHEST/LUNG: Good air entry, No wheezing  HEART: RRR, No murmurs  ABDOMEN: Soft, Bowel sounds present  EXTREMITIES:  No clubbing

## 2021-04-05 LAB
ANION GAP SERPL CALC-SCNC: 13 MMOL/L — SIGNIFICANT CHANGE UP (ref 7–14)
BASOPHILS # BLD AUTO: 0.04 K/UL — SIGNIFICANT CHANGE UP (ref 0–0.2)
BASOPHILS NFR BLD AUTO: 0.4 % — SIGNIFICANT CHANGE UP (ref 0–1)
BUN SERPL-MCNC: 14 MG/DL — SIGNIFICANT CHANGE UP (ref 10–20)
CALCIUM SERPL-MCNC: 9.2 MG/DL — SIGNIFICANT CHANGE UP (ref 8.5–10.1)
CHLORIDE SERPL-SCNC: 102 MMOL/L — SIGNIFICANT CHANGE UP (ref 98–110)
CO2 SERPL-SCNC: 28 MMOL/L — SIGNIFICANT CHANGE UP (ref 17–32)
CREAT SERPL-MCNC: 0.7 MG/DL — SIGNIFICANT CHANGE UP (ref 0.7–1.5)
EOSINOPHIL # BLD AUTO: 0.64 K/UL — SIGNIFICANT CHANGE UP (ref 0–0.7)
EOSINOPHIL NFR BLD AUTO: 7.2 % — SIGNIFICANT CHANGE UP (ref 0–8)
GLUCOSE BLDC GLUCOMTR-MCNC: 114 MG/DL — HIGH (ref 70–99)
GLUCOSE BLDC GLUCOMTR-MCNC: 88 MG/DL — SIGNIFICANT CHANGE UP (ref 70–99)
GLUCOSE BLDC GLUCOMTR-MCNC: 89 MG/DL — SIGNIFICANT CHANGE UP (ref 70–99)
GLUCOSE BLDC GLUCOMTR-MCNC: 92 MG/DL — SIGNIFICANT CHANGE UP (ref 70–99)
GLUCOSE BLDC GLUCOMTR-MCNC: 93 MG/DL — SIGNIFICANT CHANGE UP (ref 70–99)
GLUCOSE SERPL-MCNC: 98 MG/DL — SIGNIFICANT CHANGE UP (ref 70–99)
HCT VFR BLD CALC: 39.9 % — SIGNIFICANT CHANGE UP (ref 37–47)
HGB BLD-MCNC: 12.2 G/DL — SIGNIFICANT CHANGE UP (ref 12–16)
IMM GRANULOCYTES NFR BLD AUTO: 1 % — HIGH (ref 0.1–0.3)
LYMPHOCYTES # BLD AUTO: 1.85 K/UL — SIGNIFICANT CHANGE UP (ref 1.2–3.4)
LYMPHOCYTES # BLD AUTO: 20.8 % — SIGNIFICANT CHANGE UP (ref 20.5–51.1)
MCHC RBC-ENTMCNC: 28.7 PG — SIGNIFICANT CHANGE UP (ref 27–31)
MCHC RBC-ENTMCNC: 30.6 G/DL — LOW (ref 32–37)
MCV RBC AUTO: 93.9 FL — SIGNIFICANT CHANGE UP (ref 81–99)
MONOCYTES # BLD AUTO: 0.6 K/UL — SIGNIFICANT CHANGE UP (ref 0.1–0.6)
MONOCYTES NFR BLD AUTO: 6.7 % — SIGNIFICANT CHANGE UP (ref 1.7–9.3)
NEUTROPHILS # BLD AUTO: 5.68 K/UL — SIGNIFICANT CHANGE UP (ref 1.4–6.5)
NEUTROPHILS NFR BLD AUTO: 63.9 % — SIGNIFICANT CHANGE UP (ref 42.2–75.2)
NRBC # BLD: 0 /100 WBCS — SIGNIFICANT CHANGE UP (ref 0–0)
PLATELET # BLD AUTO: 264 K/UL — SIGNIFICANT CHANGE UP (ref 130–400)
POTASSIUM SERPL-MCNC: 3.6 MMOL/L — SIGNIFICANT CHANGE UP (ref 3.5–5)
POTASSIUM SERPL-SCNC: 3.6 MMOL/L — SIGNIFICANT CHANGE UP (ref 3.5–5)
RBC # BLD: 4.25 M/UL — SIGNIFICANT CHANGE UP (ref 4.2–5.4)
RBC # FLD: 12.8 % — SIGNIFICANT CHANGE UP (ref 11.5–14.5)
SODIUM SERPL-SCNC: 143 MMOL/L — SIGNIFICANT CHANGE UP (ref 135–146)
WBC # BLD: 8.9 K/UL — SIGNIFICANT CHANGE UP (ref 4.8–10.8)
WBC # FLD AUTO: 8.9 K/UL — SIGNIFICANT CHANGE UP (ref 4.8–10.8)

## 2021-04-05 PROCEDURE — 99232 SBSQ HOSP IP/OBS MODERATE 35: CPT

## 2021-04-05 PROCEDURE — 99233 SBSQ HOSP IP/OBS HIGH 50: CPT

## 2021-04-05 RX ORDER — INSULIN GLARGINE 100 [IU]/ML
8 INJECTION, SOLUTION SUBCUTANEOUS AT BEDTIME
Refills: 0 | Status: DISCONTINUED | OUTPATIENT
Start: 2021-04-05 | End: 2021-04-16

## 2021-04-05 RX ADMIN — PANTOPRAZOLE SODIUM 40 MILLIGRAM(S): 20 TABLET, DELAYED RELEASE ORAL at 06:10

## 2021-04-05 RX ADMIN — ENOXAPARIN SODIUM 40 MILLIGRAM(S): 100 INJECTION SUBCUTANEOUS at 17:56

## 2021-04-05 RX ADMIN — Medication 25 MILLIGRAM(S): at 06:10

## 2021-04-05 RX ADMIN — Medication 25 MILLIGRAM(S): at 17:56

## 2021-04-05 RX ADMIN — ENOXAPARIN SODIUM 40 MILLIGRAM(S): 100 INJECTION SUBCUTANEOUS at 06:11

## 2021-04-05 RX ADMIN — SENNA PLUS 2 TABLET(S): 8.6 TABLET ORAL at 22:03

## 2021-04-05 RX ADMIN — INSULIN GLARGINE 8 UNIT(S): 100 INJECTION, SOLUTION SUBCUTANEOUS at 22:03

## 2021-04-05 NOTE — PROGRESS NOTE ADULT - ASSESSMENT
IMPRESSION:    Acute hypoxemic resp failure on 50/50%  Severe covid pneumonia SP TOCI and PLasma   Probable bacterial superinfection treated       PLAN:    CNS: Avoid CNS depressant    HEENT:  Oral care    PULMONARY:  HOB @ 45 degrees.  Wean O2 as tolerated. s/p dexa , FIO2 to keep SAO2 88 to 94%    CARDIOVASCULAR: keep equal to negative balance.      GI: GI prophylaxis                                          Feeding po    RENAL:  F/u  lytes.  Correct as needed. accurate I/O    INFECTIOUS DISEASE: Monitor off ABX    HEMATOLOGICAL:  DVT prophylaxis.  LE doppler    ENDOCRINE:  Follow up FS.  Insulin protocol if needed.    CODE STATUS: FULL CODE    DISPOSITION: SDU    Prognosis guarded

## 2021-04-05 NOTE — PROGRESS NOTE ADULT - SUBJECTIVE AND OBJECTIVE BOX
Patient Information:  PAULA SUMO / 56y / Female / MRN#:[id number]    Hospital Day: 26d    Interval History:  Patient seen and examined at bedside. No acute events overnight. Pt sitting up in bed, is on HFNC 50/50, saturating 98%. Denies any complaints.    Past Medical History:    Past Surgical History:    Allergies:  Allergy Status Unknown    Medications:  PRN:  acetaminophen   Tablet .. 650 milliGRAM(s) Oral every 6 hours PRN Temp greater or equal to 38C (100.4F), Mild Pain (1 - 3)  guaifenesin/dextromethorphan  Syrup 10 milliLiter(s) Oral every 6 hours PRN Cough  polyethylene glycol 3350 17 Gram(s) Oral daily PRN Constipation    Standing:  chlorhexidine 4% Liquid 1 Application(s) Topical daily  dextrose 40% Gel 15 Gram(s) Oral once  dextrose 5%. 1000 milliLiter(s) (50 mL/Hr) IV Continuous <Continuous>  enoxaparin Injectable 40 milliGRAM(s) SubCutaneous two times a day  insulin glargine Injectable (LANTUS) 10 Unit(s) SubCutaneous at bedtime  insulin lispro (ADMELOG) corrective regimen sliding scale   SubCutaneous three times a day before meals  metoprolol tartrate 25 milliGRAM(s) Oral two times a day  pantoprazole    Tablet 40 milliGRAM(s) Oral before breakfast  senna 2 Tablet(s) Oral at bedtime    Home:    Vitals:  T(C): 36.8, Max: 37.7 (04-04-21 @ 20:00)  T(F): 98.2, Max: 99.8 (04-04-21 @ 20:00)  HR: 76 (76 - 91)  BP: 146/76 (122/65 - 146/76)  RR: 20 (20 - 20)  SpO2: 96% (93% - 98%)    Physical Exam:  General: Awake, alert, NAD, sitting up in bed on HFNC 50/50  HEENT: Head NC/AT  Heart: Regular rhythm, rate; loud systolic murmur best heard at LUSB  Lungs: Good air entry  Abdomen: Soft, nontender, nondistended, BS+  Extremities: No edema, clubbing, cyanosis in upper or lower extremities  Neuro: AAOx3, NFD    Labs:  CBC (04-05 @ 06:59)                        Hgb: 12.2   WBC: 8.90  )-----------------( Plts: 264                              Hct: 39.9     Chem (04-05 @ 06:59)  Na: 143  |     Cl: 102     |  BUN: 14  -----------------------------------------< Gluc: 98    K: 3.6   |    HCO3: 28  |  Cr: 0.7    Ca 9.2 (04-05 @ 06:59)

## 2021-04-05 NOTE — PROGRESS NOTE ADULT - SUBJECTIVE AND OBJECTIVE BOX
PAULA ELMORE  56y, Female  Allergy: Allergy Status Unknown    Hospital Day: 26d    Patient seen and examined earlier today. No acute events overnight.    PMH/PSH:  PAST MEDICAL & SURGICAL HISTORY:    VITALS:  T(F): 98.2 (04-05-21 @ 08:48), Max: 99.8 (04-04-21 @ 20:00)  HR: 76 (04-05-21 @ 08:02)  BP: 146/76 (04-05-21 @ 08:02) (122/65 - 146/76)  RR: 20 (04-05-21 @ 08:02)  SpO2: 96% (04-05-21 @ 08:02)    TESTS & MEASUREMENTS:  Weight (Kg):     04-03-21 @ 07:01  -  04-04-21 @ 07:00  --------------------------------------------------------  IN: 420 mL / OUT: 300 mL / NET: 120 mL                            12.2   8.90  )-----------( 264      ( 05 Apr 2021 06:59 )             39.9     04-05    143  |  102  |  14  ----------------------------<  98  3.6   |  28  |  0.7    Ca    9.2      05 Apr 2021 06:59      RECENT DIAGNOSTIC ORDERS:  VA Duplex Lower Ext Vein Scan, Bilat: Routine   Indication: rule out dvt  Transport: Stretcher-Jesse (04-05-21 @ 07:21)    MEDICATIONS:  MEDICATIONS  (STANDING):  chlorhexidine 4% Liquid 1 Application(s) Topical daily  dextrose 40% Gel 15 Gram(s) Oral once  dextrose 5%. 1000 milliLiter(s) (50 mL/Hr) IV Continuous <Continuous>  enoxaparin Injectable 40 milliGRAM(s) SubCutaneous two times a day  insulin glargine Injectable (LANTUS) 10 Unit(s) SubCutaneous at bedtime  insulin lispro (ADMELOG) corrective regimen sliding scale   SubCutaneous three times a day before meals  metoprolol tartrate 25 milliGRAM(s) Oral two times a day  pantoprazole    Tablet 40 milliGRAM(s) Oral before breakfast  senna 2 Tablet(s) Oral at bedtime    MEDICATIONS  (PRN):  acetaminophen   Tablet .. 650 milliGRAM(s) Oral every 6 hours PRN Temp greater or equal to 38C (100.4F), Mild Pain (1 - 3)  guaifenesin/dextromethorphan  Syrup 10 milliLiter(s) Oral every 6 hours PRN Cough  polyethylene glycol 3350 17 Gram(s) Oral daily PRN Constipation      HOME MEDICATIONS:    REVIEW OF SYSTEMS:  All other review of systems is negative unless indicated above.     PHYSICAL EXAM:  GENERAL: NAD  HEENT: No Swelling  CHEST/LUNG: Good air entry, No wheezing  HEART: RRR, No murmurs  ABDOMEN: Soft, Bowel sounds present  EXTREMITIES:  No clubbing

## 2021-04-05 NOTE — PROGRESS NOTE ADULT - SUBJECTIVE AND OBJECTIVE BOX
OVERNIGHT EVENTS: events noted, on FNC 50/50, low grade fever    Vital Signs Last 24 Hrs  T(C): 37.7 (04 Apr 2021 20:00), Max: 37.7 (04 Apr 2021 20:00)  T(F): 99.8 (04 Apr 2021 20:00), Max: 99.8 (04 Apr 2021 20:00)  HR: 84 (04 Apr 2021 20:00) (78 - 84)  BP: 127/59 (04 Apr 2021 20:00) (122/65 - 140/65)  BP(mean): --  RR: 20 (04 Apr 2021 16:00) (20 - 20)  SpO2: 98% (05 Apr 2021 00:03) (92% - 98%)    PHYSICAL EXAMINATION:    GENERAL: ill looking    HEENT: Head is normocephalic and atraumatic.    NECK: Supple.    LUNGS: bl crackles    HEART: Regular rate and rhythm without murmur.    ABDOMEN: Soft, nontender, and nondistended.      EXTREMITIES: Without any cyanosis, clubbing, rash, lesions or edema.    NEUROLOGIC: Grossly intact.    SKIN: No ulceration or induration present.      LABS:                        12.0   9.34  )-----------( 247      ( 04 Apr 2021 09:20 )             38.5     04-04    139  |  100  |  14  ----------------------------<  92  4.0   |  29  |  0.7    Ca    9.2      04 Apr 2021 09:20                  Serum Pro-Brain Natriuretic Peptide: 61 pg/mL (04-02-21 @ 09:17)              MICROBIOLOGY:      MEDICATIONS  (STANDING):  chlorhexidine 4% Liquid 1 Application(s) Topical daily  dextrose 40% Gel 15 Gram(s) Oral once  dextrose 5%. 1000 milliLiter(s) (50 mL/Hr) IV Continuous <Continuous>  enoxaparin Injectable 40 milliGRAM(s) SubCutaneous two times a day  insulin glargine Injectable (LANTUS) 10 Unit(s) SubCutaneous at bedtime  insulin lispro (ADMELOG) corrective regimen sliding scale   SubCutaneous three times a day before meals  metoprolol tartrate 25 milliGRAM(s) Oral two times a day  pantoprazole    Tablet 40 milliGRAM(s) Oral before breakfast  senna 2 Tablet(s) Oral at bedtime    MEDICATIONS  (PRN):  acetaminophen   Tablet .. 650 milliGRAM(s) Oral every 6 hours PRN Temp greater or equal to 38C (100.4F), Mild Pain (1 - 3)  guaifenesin/dextromethorphan  Syrup 10 milliLiter(s) Oral every 6 hours PRN Cough  polyethylene glycol 3350 17 Gram(s) Oral daily PRN Constipation      RADIOLOGY & ADDITIONAL STUDIES:

## 2021-04-05 NOTE — PROGRESS NOTE ADULT - ASSESSMENT
56 year old F with PMHx of HTN, admitted for acute hypoxemic respiratory failure due to COVID PNA.    #Acute hypoxemic respiratory failure secondary to COVID-19 pneumonia  - COVID-19 PCR positive  - Isolation precautions (contact, droplet, airborne)  - Chest X-ray 4/2 reveals inc bilateral opacities  - Patient is saturating ~98% on HFNC 50L/50%  - LE Duplex negative 3/23, CT Angio 3/29 negative for PE  - Repeat LE duplex performed - bilateral soleal vein thrombosis, no deep DVTs  - s/p RDV, dexamethasone, course of Levaquin  - DVT prophylaxis per protocol  - Titrate supplemental O2 to maintain SpO2 92-96%    #HTN  #Sinus tachycardia  - C/w Metoprolol 25 mg BID    #DMII  - HbA1c 6.9%  - FS well controlled  - C/w Lantus 20 U    DVT ppx: Lovenox 40 mg BID  GI ppx: PPI  Diet: CC  Activity: IAT  Full code  Dispo: acute, c/w CEU monitoring, wean down HFNC as tolerated      56 year old F with PMHx of HTN, admitted for acute hypoxemic respiratory failure due to COVID PNA.    #Acute hypoxemic respiratory failure secondary to COVID-19 pneumonia  - COVID-19 PCR positive  - Isolation precautions (contact, droplet, airborne)  - Chest X-ray 4/2 reveals inc bilateral opacities  - Patient is saturating ~98% on HFNC 50L/50%  - LE Duplex negative 3/23, CT Angio 3/29 negative for PE  - Repeat LE duplex performed - bilateral soleal vein thrombosis, no deep DVTs  - s/p RDV, dexamethasone, course of Levaquin  - DVT prophylaxis per protocol  - Titrate supplemental O2 to maintain SpO2 92-96%    #HTN  #Sinus tachycardia  - C/w Metoprolol 25 mg BID    #DMII  - HbA1c 6.9%  - FS well controlled  - C/w Lantus 20 U    DVT ppx: Lovenox 40 mg BID  GI ppx: PPI  Diet: CC  Activity: IAT  Full code  Dispo: acute, c/w CEU monitoring, wean down HFNC as tolerated       Spoke to , provided medical update and answered all questions.

## 2021-04-06 LAB
ALBUMIN SERPL ELPH-MCNC: 3.4 G/DL — LOW (ref 3.5–5.2)
ALP SERPL-CCNC: 68 U/L — SIGNIFICANT CHANGE UP (ref 30–115)
ALT FLD-CCNC: 13 U/L — SIGNIFICANT CHANGE UP (ref 0–41)
ANION GAP SERPL CALC-SCNC: 12 MMOL/L — SIGNIFICANT CHANGE UP (ref 7–14)
AST SERPL-CCNC: 19 U/L — SIGNIFICANT CHANGE UP (ref 0–41)
BASOPHILS # BLD AUTO: 0.06 K/UL — SIGNIFICANT CHANGE UP (ref 0–0.2)
BASOPHILS NFR BLD AUTO: 0.7 % — SIGNIFICANT CHANGE UP (ref 0–1)
BILIRUB SERPL-MCNC: 0.3 MG/DL — SIGNIFICANT CHANGE UP (ref 0.2–1.2)
BUN SERPL-MCNC: 13 MG/DL — SIGNIFICANT CHANGE UP (ref 10–20)
CALCIUM SERPL-MCNC: 9.3 MG/DL — SIGNIFICANT CHANGE UP (ref 8.5–10.1)
CHLORIDE SERPL-SCNC: 100 MMOL/L — SIGNIFICANT CHANGE UP (ref 98–110)
CO2 SERPL-SCNC: 27 MMOL/L — SIGNIFICANT CHANGE UP (ref 17–32)
CREAT SERPL-MCNC: 0.6 MG/DL — LOW (ref 0.7–1.5)
EOSINOPHIL # BLD AUTO: 0.95 K/UL — HIGH (ref 0–0.7)
EOSINOPHIL NFR BLD AUTO: 10.3 % — HIGH (ref 0–8)
GLUCOSE BLDC GLUCOMTR-MCNC: 101 MG/DL — HIGH (ref 70–99)
GLUCOSE BLDC GLUCOMTR-MCNC: 103 MG/DL — HIGH (ref 70–99)
GLUCOSE BLDC GLUCOMTR-MCNC: 147 MG/DL — HIGH (ref 70–99)
GLUCOSE BLDC GLUCOMTR-MCNC: 179 MG/DL — HIGH (ref 70–99)
GLUCOSE SERPL-MCNC: 103 MG/DL — HIGH (ref 70–99)
HCT VFR BLD CALC: 39.6 % — SIGNIFICANT CHANGE UP (ref 37–47)
HGB BLD-MCNC: 12.1 G/DL — SIGNIFICANT CHANGE UP (ref 12–16)
IMM GRANULOCYTES NFR BLD AUTO: 1 % — HIGH (ref 0.1–0.3)
LYMPHOCYTES # BLD AUTO: 1.82 K/UL — SIGNIFICANT CHANGE UP (ref 1.2–3.4)
LYMPHOCYTES # BLD AUTO: 19.8 % — LOW (ref 20.5–51.1)
MCHC RBC-ENTMCNC: 28.5 PG — SIGNIFICANT CHANGE UP (ref 27–31)
MCHC RBC-ENTMCNC: 30.6 G/DL — LOW (ref 32–37)
MCV RBC AUTO: 93.4 FL — SIGNIFICANT CHANGE UP (ref 81–99)
MONOCYTES # BLD AUTO: 0.69 K/UL — HIGH (ref 0.1–0.6)
MONOCYTES NFR BLD AUTO: 7.5 % — SIGNIFICANT CHANGE UP (ref 1.7–9.3)
NEUTROPHILS # BLD AUTO: 5.6 K/UL — SIGNIFICANT CHANGE UP (ref 1.4–6.5)
NEUTROPHILS NFR BLD AUTO: 60.7 % — SIGNIFICANT CHANGE UP (ref 42.2–75.2)
NRBC # BLD: 0 /100 WBCS — SIGNIFICANT CHANGE UP (ref 0–0)
PLATELET # BLD AUTO: 275 K/UL — SIGNIFICANT CHANGE UP (ref 130–400)
POTASSIUM SERPL-MCNC: 3.9 MMOL/L — SIGNIFICANT CHANGE UP (ref 3.5–5)
POTASSIUM SERPL-SCNC: 3.9 MMOL/L — SIGNIFICANT CHANGE UP (ref 3.5–5)
PROT SERPL-MCNC: 6.4 G/DL — SIGNIFICANT CHANGE UP (ref 6–8)
RBC # BLD: 4.24 M/UL — SIGNIFICANT CHANGE UP (ref 4.2–5.4)
RBC # FLD: 12.9 % — SIGNIFICANT CHANGE UP (ref 11.5–14.5)
SODIUM SERPL-SCNC: 139 MMOL/L — SIGNIFICANT CHANGE UP (ref 135–146)
WBC # BLD: 9.21 K/UL — SIGNIFICANT CHANGE UP (ref 4.8–10.8)
WBC # FLD AUTO: 9.21 K/UL — SIGNIFICANT CHANGE UP (ref 4.8–10.8)

## 2021-04-06 PROCEDURE — 93970 EXTREMITY STUDY: CPT | Mod: 26

## 2021-04-06 PROCEDURE — 99232 SBSQ HOSP IP/OBS MODERATE 35: CPT

## 2021-04-06 RX ORDER — FUROSEMIDE 40 MG
40 TABLET ORAL DAILY
Refills: 0 | Status: DISCONTINUED | OUTPATIENT
Start: 2021-04-06 | End: 2021-04-08

## 2021-04-06 RX ORDER — METOPROLOL TARTRATE 50 MG
25 TABLET ORAL
Refills: 0 | Status: DISCONTINUED | OUTPATIENT
Start: 2021-04-06 | End: 2021-04-16

## 2021-04-06 RX ORDER — ENOXAPARIN SODIUM 100 MG/ML
100 INJECTION SUBCUTANEOUS
Refills: 0 | Status: DISCONTINUED | OUTPATIENT
Start: 2021-04-06 | End: 2021-04-14

## 2021-04-06 RX ORDER — DEXAMETHASONE 0.5 MG/5ML
6 ELIXIR ORAL DAILY
Refills: 0 | Status: DISCONTINUED | OUTPATIENT
Start: 2021-04-06 | End: 2021-04-11

## 2021-04-06 RX ADMIN — ENOXAPARIN SODIUM 100 MILLIGRAM(S): 100 INJECTION SUBCUTANEOUS at 17:25

## 2021-04-06 RX ADMIN — Medication 40 MILLIGRAM(S): at 11:19

## 2021-04-06 RX ADMIN — SENNA PLUS 2 TABLET(S): 8.6 TABLET ORAL at 21:57

## 2021-04-06 RX ADMIN — INSULIN GLARGINE 8 UNIT(S): 100 INJECTION, SOLUTION SUBCUTANEOUS at 21:56

## 2021-04-06 RX ADMIN — CHLORHEXIDINE GLUCONATE 1 APPLICATION(S): 213 SOLUTION TOPICAL at 11:21

## 2021-04-06 RX ADMIN — ENOXAPARIN SODIUM 40 MILLIGRAM(S): 100 INJECTION SUBCUTANEOUS at 06:52

## 2021-04-06 RX ADMIN — Medication 6 MILLIGRAM(S): at 11:20

## 2021-04-06 RX ADMIN — Medication 25 MILLIGRAM(S): at 06:52

## 2021-04-06 RX ADMIN — PANTOPRAZOLE SODIUM 40 MILLIGRAM(S): 20 TABLET, DELAYED RELEASE ORAL at 06:51

## 2021-04-06 RX ADMIN — Medication 25 MILLIGRAM(S): at 17:42

## 2021-04-06 RX ADMIN — Medication 1: at 17:23

## 2021-04-06 NOTE — PROGRESS NOTE ADULT - SUBJECTIVE AND OBJECTIVE BOX
CATARINA, PAULA  56y, Female  Allergy: Allergy Status Unknown    Hospital Day: 27d    Patient seen and examined earlier today. No acute events overnight.    PMH/PSH:  PAST MEDICAL & SURGICAL HISTORY:    VITALS:  T(F): 99 (04-06-21 @ 10:19), Max: 99.1 (04-05-21 @ 20:00)  HR: 96 (04-06-21 @ 10:19)  BP: 111/61 (04-06-21 @ 04:00) (111/61 - 148/65)  RR: 20 (04-06-21 @ 00:11)  SpO2: 97% (04-06-21 @ 08:35)    TESTS & MEASUREMENTS:  Weight (Kg):                         12.1   9.21  )-----------( 275      ( 06 Apr 2021 09:02 )             39.6     04-05    143  |  102  |  14  ----------------------------<  98  3.6   |  28  |  0.7    Ca    9.2      05 Apr 2021 06:59    RECENT DIAGNOSTIC ORDERS:  Xray Chest 1 View- PORTABLE-Routine: AM   Indication: covid  Transport: Portable (04-06-21 @ 10:24)  Lactate Dehydrogenase, Serum: AM Sched. Collection: 07-Apr-2021 04:30 (04-06-21 @ 07:20)  Ferritin, Serum: AM Sched. Collection: 07-Apr-2021 04:30 (04-06-21 @ 07:20)  Procalcitonin, Serum: AM Sched. Collection: 07-Apr-2021 04:30 (04-06-21 @ 07:20)  C-Reactive Protein, Serum: AM Sched. Collection: 07-Apr-2021 04:30 (04-06-21 @ 07:20)  Comprehensive Metabolic Panel: AM Sched. Collection: 06-Apr-2021 04:30 (04-05-21 @ 11:14)  Comprehensive Metabolic Panel: Repeat From: 05-Apr-2021 11:14 To: 11-Apr-2021 04:30, Every 1 day(s) (04-05-21 @ 11:14)  Complete Blood Count + Automated Diff: Repeat From: 05-Apr-2021 11:14 To: 11-Apr-2021 04:30, Every 1 day(s) (04-05-21 @ 11:14)    MEDICATIONS:  MEDICATIONS  (STANDING):  chlorhexidine 4% Liquid 1 Application(s) Topical daily  dexAMETHasone  Injectable 6 milliGRAM(s) IV Push daily  dextrose 40% Gel 15 Gram(s) Oral once  dextrose 5%. 1000 milliLiter(s) (50 mL/Hr) IV Continuous <Continuous>  enoxaparin Injectable 100 milliGRAM(s) SubCutaneous two times a day  furosemide   Injectable 40 milliGRAM(s) IV Push daily  insulin glargine Injectable (LANTUS) 8 Unit(s) SubCutaneous at bedtime  insulin lispro (ADMELOG) corrective regimen sliding scale   SubCutaneous three times a day before meals  metoprolol tartrate 25 milliGRAM(s) Oral two times a day  pantoprazole    Tablet 40 milliGRAM(s) Oral before breakfast  senna 2 Tablet(s) Oral at bedtime    MEDICATIONS  (PRN):  acetaminophen   Tablet .. 650 milliGRAM(s) Oral every 6 hours PRN Temp greater or equal to 38C (100.4F), Mild Pain (1 - 3)  guaifenesin/dextromethorphan  Syrup 10 milliLiter(s) Oral every 6 hours PRN Cough  polyethylene glycol 3350 17 Gram(s) Oral daily PRN Constipation    HOME MEDICATIONS:    REVIEW OF SYSTEMS:  All other review of systems is negative unless indicated above.     PHYSICAL EXAM:  GENERAL: NAD  HEENT: No Swelling  CHEST/LUNG: Good air entry, No wheezing  HEART: RRR, No murmurs  ABDOMEN: Soft, Bowel sounds present  EXTREMITIES:  No clubbing

## 2021-04-06 NOTE — PROGRESS NOTE ADULT - ASSESSMENT
56 year old F with PMHx of HTN, admitted for acute hypoxemic respiratory failure due to COVID PNA.    #Acute hypoxemic respiratory failure secondary to COVID-19 pneumonia  - COVID-19 PCR positive  - Isolation precautions (contact, droplet, airborne)  - Chest X-ray 4/2 reveals inc bilateral opacities  - Patient is saturating 100% on HFNC 60L/100%  - LE Duplex negative 3/23, CT Angio 3/29 negative for PE  - LE duplex performed - bilateral soleal vein thrombosis, no deep DVTs  - Repeat LE performed, pending read  - s/p RDV, dexamethasone, course of Levaquin  - Will resume Dexamethasone 6mg once daily   - DVT prophylaxis per protocol  - Titrate supplemental O2 to maintain SpO2 92-96%    #HTN  #Sinus tachycardia  - C/w Metoprolol 25 mg BID    #DMII  - HbA1c 6.9%  - FS well controlled  - C/w Lantus 20 U    DVT ppx: Lovenox 40 mg BID  GI ppx: PPI  Diet: CC  Activity: IAT  Full code  Dispo: acute, c/w CEU monitoring, wean down HFNC as tolerated      56 year old F with PMHx of HTN, admitted for acute hypoxemic respiratory failure due to COVID PNA.    #Acute hypoxemic respiratory failure secondary to COVID-19 pneumonia  - COVID-19 PCR positive  - Isolation precautions (contact, droplet, airborne)  - Chest X-ray 4/2 reveals inc bilateral opacities  - Patient is saturating 100% on HFNC 60L/100%  - LE Duplex negative 3/23, CT Angio 3/29 negative for PE  - LE duplex performed - bilateral soleal vein thrombosis, no deep DVTs  - Repeat LE performed, pending read  - s/p RDV, dexamethasone, course of Levaquin  - Will resume Dexamethasone 6mg once daily   - DVT prophylaxis per protocol  - Titrate supplemental O2 to maintain SpO2 92-96%    #HTN  #Sinus tachycardia  - C/w Metoprolol 25 mg BID    #DMII  - HbA1c 6.9%  - FS well controlled  - C/w Lantus 20 U    DVT ppx: Lovenox 40 mg BID  GI ppx: PPI  Diet: CC  Activity: IAT  Full code  Dispo: acute, c/w CEU monitoring, wean down HFNC as tolerated     Spoke to , provided medical update and answered questions.

## 2021-04-06 NOTE — PROGRESS NOTE ADULT - ASSESSMENT
Patient is a 56-year-old, female patient, PMHx: Hypertension, presented to the ED for Shortness of breath and found to have COVID19 PNA    #Acute Hypoxic Respiratory Failure  #COVID 19 PNA  Currently on HFNC 50/50% saturating 95%  s/p RDV therapy  Ddimers elevated to 33K on 03/16, LE duplex negative  s/p Levaquin x7d (03/12-03/19)  CTA 03/29: No PE  LE Duplex (04/01): b/l soleal vein thrombosis  BNP 04/02, 61  s/p decadron (End 03/31)  - Lovenox DVT PPX 40 BID  - Taper down oxygen requirements as tolerated  - Monitor I/Os, keep equal to negative balance    #HTN  #Sinus tachycardia  - Metoprolol tartrate 25 BID    #DM2, new onset  Hba1c 6.9 (03/12)  - Lantus/lispro 20-3-3-3  - ISS  - Once downgraded, endocrine consult for outpt tx recommendations    #Progress Note Handoff  Pending (specify): Tapering off HFNC  Family discussion: d/w pt regarding tapering down O2 requirements  Disposition: Requiring HFNC, TBD

## 2021-04-06 NOTE — PROGRESS NOTE ADULT - SUBJECTIVE AND OBJECTIVE BOX
OVERNIGHT EVENTS: events noted on FNC 50/60%    Vital Signs Last 24 Hrs  T(C): 37.2 (06 Apr 2021 00:11), Max: 37.3 (05 Apr 2021 20:00)  T(F): 99 (06 Apr 2021 00:11), Max: 99.1 (05 Apr 2021 20:00)  HR: 93 (06 Apr 2021 00:11) (76 - 112)  BP: 133/62 (06 Apr 2021 00:11) (133/62 - 148/65)  BP(mean): 92 (05 Apr 2021 17:22) (91 - 94)  RR: 20 (06 Apr 2021 00:11) (20 - 20)  SpO2: 100% (06 Apr 2021 00:11) (92% - 100%)    PHYSICAL EXAMINATION:    GENERAL: ill looking    HEENT: Head is normocephalic and atraumatic.    NECK: Supple.    LUNGS: bl crackles    HEART: Regular rate and rhythm without murmur.    ABDOMEN: Soft, nontender, and nondistended.      EXTREMITIES: Without any cyanosis, clubbing, rash, lesions or edema.    NEUROLOGIC: Grossly intact.    SKIN: No ulceration or induration present.      LABS:                        12.2   8.90  )-----------( 264      ( 05 Apr 2021 06:59 )             39.9     04-05    143  |  102  |  14  ----------------------------<  98  3.6   |  28  |  0.7    Ca    9.2      05 Apr 2021 06:59                              MICROBIOLOGY:      MEDICATIONS  (STANDING):  chlorhexidine 4% Liquid 1 Application(s) Topical daily  dextrose 40% Gel 15 Gram(s) Oral once  dextrose 5%. 1000 milliLiter(s) (50 mL/Hr) IV Continuous <Continuous>  enoxaparin Injectable 40 milliGRAM(s) SubCutaneous two times a day  insulin glargine Injectable (LANTUS) 8 Unit(s) SubCutaneous at bedtime  insulin lispro (ADMELOG) corrective regimen sliding scale   SubCutaneous three times a day before meals  metoprolol tartrate 25 milliGRAM(s) Oral two times a day  pantoprazole    Tablet 40 milliGRAM(s) Oral before breakfast  senna 2 Tablet(s) Oral at bedtime    MEDICATIONS  (PRN):  acetaminophen   Tablet .. 650 milliGRAM(s) Oral every 6 hours PRN Temp greater or equal to 38C (100.4F), Mild Pain (1 - 3)  guaifenesin/dextromethorphan  Syrup 10 milliLiter(s) Oral every 6 hours PRN Cough  polyethylene glycol 3350 17 Gram(s) Oral daily PRN Constipation      RADIOLOGY & ADDITIONAL STUDIES:

## 2021-04-06 NOTE — PROGRESS NOTE ADULT - SUBJECTIVE AND OBJECTIVE BOX
Patient Information:  PAULA SUMO / 56y / Female / MRN#:[id number]    Hospital Day: 27d    Interval History:  Patient seen and examined at bedside. Pt desaturated to mid 80s% this morning while on HFNC 60L/70%, inc FiO2 to 100%. Now saturating 100%, states she feels the same, denies any discomfort or difficulty breathing, does not appear labored.    Past Medical History:    Past Surgical History:    Allergies:  Allergy Status Unknown    Medications:  PRN:  acetaminophen   Tablet .. 650 milliGRAM(s) Oral every 6 hours PRN Temp greater or equal to 38C (100.4F), Mild Pain (1 - 3)  guaifenesin/dextromethorphan  Syrup 10 milliLiter(s) Oral every 6 hours PRN Cough  polyethylene glycol 3350 17 Gram(s) Oral daily PRN Constipation    Standing:  chlorhexidine 4% Liquid 1 Application(s) Topical daily  dextrose 40% Gel 15 Gram(s) Oral once  dextrose 5%. 1000 milliLiter(s) (50 mL/Hr) IV Continuous <Continuous>  enoxaparin Injectable 40 milliGRAM(s) SubCutaneous two times a day  insulin glargine Injectable (LANTUS) 8 Unit(s) SubCutaneous at bedtime  insulin lispro (ADMELOG) corrective regimen sliding scale   SubCutaneous three times a day before meals  metoprolol tartrate 25 milliGRAM(s) Oral two times a day  pantoprazole    Tablet 40 milliGRAM(s) Oral before breakfast  senna 2 Tablet(s) Oral at bedtime    Home:    Vitals:  T(C): 36.9, Max: 37.3 (04-05-21 @ 20:00)  T(F): 98.5, Max: 99.1 (04-05-21 @ 20:00)  HR: 97 (88 - 112)  BP: 111/61 (111/61 - 148/65)  RR: 20 (20 - 20)  SpO2: 98% (92% - 100%)    Physical Exam:  General: Awake, alert, NAD, sitting up in bed on HFNC 60/100%  HEENT: Head NC/AT  Heart: Regular rhythm, rate; loud systolic murmur best heard at LUSB  Lungs: Good air entry  Abdomen: Soft, nontender, nondistended, BS+  Extremities: No edema, clubbing, cyanosis in upper or lower extremities  Neuro: AAOx3, NFD    Labs:  CBC (04-05 @ 06:59)                        Hgb: 12.2   WBC: 8.90  )-----------------( Plts: 264                              Hct: 39.9     Chem (04-05 @ 06:59)  Na: 143  |     Cl: 102     |  BUN: 14  -----------------------------------------< Gluc: 98    K: 3.6   |    HCO3: 28  |  Cr: 0.7    Ca 9.2 (04-05 @ 06:59)

## 2021-04-06 NOTE — PROGRESS NOTE ADULT - ASSESSMENT
IMPRESSION:    Acute hypoxemic resp failure   Severe covid pneumonia SP TOCI and PLasma   Probable bacterial superinfection treated       PLAN:    CNS: Avoid CNS depressant    HEENT:  Oral care    PULMONARY:  HOB @ 45 degrees.  Wean O2 as tolerated. keep dexa , FIO2 to keep SAO2 88 to 94%    CARDIOVASCULAR: keep equal to negative balance.      GI: GI prophylaxis                                          Feeding po    RENAL:  F/u  lytes.  Correct as needed. accurate I/O    INFECTIOUS DISEASE: trend inf markers    HEMATOLOGICAL:  DVT prophylaxis.  LE doppler    ENDOCRINE:  Follow up FS.  Insulin protocol if needed.    CODE STATUS: FULL CODE    DISPOSITION: SDU    Prognosis guarded

## 2021-04-07 LAB
ALBUMIN SERPL ELPH-MCNC: 3.9 G/DL — SIGNIFICANT CHANGE UP (ref 3.5–5.2)
ALP SERPL-CCNC: 78 U/L — SIGNIFICANT CHANGE UP (ref 30–115)
ALT FLD-CCNC: 15 U/L — SIGNIFICANT CHANGE UP (ref 0–41)
ANION GAP SERPL CALC-SCNC: 13 MMOL/L — SIGNIFICANT CHANGE UP (ref 7–14)
AST SERPL-CCNC: 19 U/L — SIGNIFICANT CHANGE UP (ref 0–41)
BASOPHILS # BLD AUTO: 0.03 K/UL — SIGNIFICANT CHANGE UP (ref 0–0.2)
BASOPHILS NFR BLD AUTO: 0.3 % — SIGNIFICANT CHANGE UP (ref 0–1)
BILIRUB SERPL-MCNC: 0.3 MG/DL — SIGNIFICANT CHANGE UP (ref 0.2–1.2)
BUN SERPL-MCNC: 20 MG/DL — SIGNIFICANT CHANGE UP (ref 10–20)
CALCIUM SERPL-MCNC: 9.6 MG/DL — SIGNIFICANT CHANGE UP (ref 8.5–10.1)
CHLORIDE SERPL-SCNC: 99 MMOL/L — SIGNIFICANT CHANGE UP (ref 98–110)
CO2 SERPL-SCNC: 29 MMOL/L — SIGNIFICANT CHANGE UP (ref 17–32)
CREAT SERPL-MCNC: 0.7 MG/DL — SIGNIFICANT CHANGE UP (ref 0.7–1.5)
EOSINOPHIL # BLD AUTO: 0.11 K/UL — SIGNIFICANT CHANGE UP (ref 0–0.7)
EOSINOPHIL NFR BLD AUTO: 1 % — SIGNIFICANT CHANGE UP (ref 0–8)
GLUCOSE BLDC GLUCOMTR-MCNC: 116 MG/DL — HIGH (ref 70–99)
GLUCOSE BLDC GLUCOMTR-MCNC: 148 MG/DL — HIGH (ref 70–99)
GLUCOSE BLDC GLUCOMTR-MCNC: 150 MG/DL — HIGH (ref 70–99)
GLUCOSE BLDC GLUCOMTR-MCNC: 172 MG/DL — HIGH (ref 70–99)
GLUCOSE SERPL-MCNC: 99 MG/DL — SIGNIFICANT CHANGE UP (ref 70–99)
HCT VFR BLD CALC: 41.3 % — SIGNIFICANT CHANGE UP (ref 37–47)
HGB BLD-MCNC: 12.8 G/DL — SIGNIFICANT CHANGE UP (ref 12–16)
IMM GRANULOCYTES NFR BLD AUTO: 1.1 % — HIGH (ref 0.1–0.3)
LDH SERPL L TO P-CCNC: 461 — HIGH (ref 50–242)
LYMPHOCYTES # BLD AUTO: 1.71 K/UL — SIGNIFICANT CHANGE UP (ref 1.2–3.4)
LYMPHOCYTES # BLD AUTO: 16 % — LOW (ref 20.5–51.1)
MCHC RBC-ENTMCNC: 28.7 PG — SIGNIFICANT CHANGE UP (ref 27–31)
MCHC RBC-ENTMCNC: 31 G/DL — LOW (ref 32–37)
MCV RBC AUTO: 92.6 FL — SIGNIFICANT CHANGE UP (ref 81–99)
MONOCYTES # BLD AUTO: 0.62 K/UL — HIGH (ref 0.1–0.6)
MONOCYTES NFR BLD AUTO: 5.8 % — SIGNIFICANT CHANGE UP (ref 1.7–9.3)
NEUTROPHILS # BLD AUTO: 8.08 K/UL — HIGH (ref 1.4–6.5)
NEUTROPHILS NFR BLD AUTO: 75.8 % — HIGH (ref 42.2–75.2)
NRBC # BLD: 0 /100 WBCS — SIGNIFICANT CHANGE UP (ref 0–0)
PLATELET # BLD AUTO: 319 K/UL — SIGNIFICANT CHANGE UP (ref 130–400)
POTASSIUM SERPL-MCNC: 4 MMOL/L — SIGNIFICANT CHANGE UP (ref 3.5–5)
POTASSIUM SERPL-SCNC: 4 MMOL/L — SIGNIFICANT CHANGE UP (ref 3.5–5)
PROT SERPL-MCNC: 6.9 G/DL — SIGNIFICANT CHANGE UP (ref 6–8)
RBC # BLD: 4.46 M/UL — SIGNIFICANT CHANGE UP (ref 4.2–5.4)
RBC # FLD: 12.8 % — SIGNIFICANT CHANGE UP (ref 11.5–14.5)
SODIUM SERPL-SCNC: 141 MMOL/L — SIGNIFICANT CHANGE UP (ref 135–146)
WBC # BLD: 10.67 K/UL — SIGNIFICANT CHANGE UP (ref 4.8–10.8)
WBC # FLD AUTO: 10.67 K/UL — SIGNIFICANT CHANGE UP (ref 4.8–10.8)

## 2021-04-07 PROCEDURE — 99232 SBSQ HOSP IP/OBS MODERATE 35: CPT

## 2021-04-07 PROCEDURE — 71045 X-RAY EXAM CHEST 1 VIEW: CPT | Mod: 26

## 2021-04-07 RX ADMIN — Medication 25 MILLIGRAM(S): at 06:30

## 2021-04-07 RX ADMIN — CHLORHEXIDINE GLUCONATE 1 APPLICATION(S): 213 SOLUTION TOPICAL at 06:26

## 2021-04-07 RX ADMIN — ENOXAPARIN SODIUM 100 MILLIGRAM(S): 100 INJECTION SUBCUTANEOUS at 06:28

## 2021-04-07 RX ADMIN — Medication 1: at 17:54

## 2021-04-07 RX ADMIN — PANTOPRAZOLE SODIUM 40 MILLIGRAM(S): 20 TABLET, DELAYED RELEASE ORAL at 06:30

## 2021-04-07 RX ADMIN — INSULIN GLARGINE 8 UNIT(S): 100 INJECTION, SOLUTION SUBCUTANEOUS at 21:33

## 2021-04-07 RX ADMIN — Medication 25 MILLIGRAM(S): at 17:54

## 2021-04-07 RX ADMIN — ENOXAPARIN SODIUM 100 MILLIGRAM(S): 100 INJECTION SUBCUTANEOUS at 17:54

## 2021-04-07 RX ADMIN — SENNA PLUS 2 TABLET(S): 8.6 TABLET ORAL at 21:33

## 2021-04-07 RX ADMIN — Medication 6 MILLIGRAM(S): at 06:27

## 2021-04-07 RX ADMIN — Medication 40 MILLIGRAM(S): at 06:29

## 2021-04-07 NOTE — PROGRESS NOTE ADULT - SUBJECTIVE AND OBJECTIVE BOX
PAULA ELMORE  56y Female    CHIEF COMPLAINT:    Patient is a 56y old  Female who presents with a chief complaint of Shortness of breath (07 Apr 2021 09:27)      INTERVAL HPI/OVERNIGHT EVENTS:    Patient seen and examined. No acute events overnight. Remains on HFNC    ROS: All other systems are negative.    Vital Signs:    T(F): 98.6 (04-07-21 @ 12:00), Max: 99.3 (04-06-21 @ 16:00)  HR: 107 (04-07-21 @ 12:00) (82 - 998)  BP: 145/65 (04-07-21 @ 12:00) (109/54 - 145/65)  RR: 20 (04-07-21 @ 12:00) (18 - 20)  SpO2: 98% (04-07-21 @ 12:00) (97% - 98%)    06 Apr 2021 07:01  -  07 Apr 2021 07:00  --------------------------------------------------------  IN: 0 mL / OUT: 1300 mL / NET: -1300 mL    POCT Blood Glucose.: 150 mg/dL (07 Apr 2021 12:07)  POCT Blood Glucose.: 116 mg/dL (07 Apr 2021 08:00)  POCT Blood Glucose.: 147 mg/dL (06 Apr 2021 21:21)  POCT Blood Glucose.: 179 mg/dL (06 Apr 2021 16:23)    PHYSICAL EXAM:    GENERAL:  NAD  SKIN: No rashes or lesions  HEENT: Atraumatic. Normocephalic.  NECK: Supple, No JVD.  PULMONARY: coarse breath sounds. No wheezing. No rales  CVS: Normal S1, S2. Rate and Rhythm are regular.    ABDOMEN/GI: Soft, Nontender, Nondistended; BS present  MSK:  No edema B/L LE. No clubbing or cyanosis  NEUROLOGIC: moves all extremities  PSYCH: Alert & oriented x 3, normal affect    Consultant(s) Notes Reviewed:  [x ] YES  [ ] NO  Care Discussed with Consultants/Other Providers [ x] YES  [ ] NO    LABS:                        12.8   10.67 )-----------( 319      ( 07 Apr 2021 07:29 )             41.3     141  |  99  |  20  ----------------------------<  99  4.0   |  29  |  0.7    Ca    9.6      07 Apr 2021 07:29    TPro  6.9  /  Alb  3.9  /  TBili  0.3  /  DBili  x   /  AST  19  /  ALT  15  /  AlkPhos  78  04-07    Serum Pro-Brain Natriuretic Peptide: 61 pg/mL (04-02-21 @ 09:17)    RADIOLOGY & ADDITIONAL TESTS:  Imaging or report Personally Reviewed:  [x] YES  [ ] NO  EKG reviewed: [x] YES  [ ] NO    Medications:  Standing  chlorhexidine 4% Liquid 1 Application(s) Topical daily  dexAMETHasone  Injectable 6 milliGRAM(s) IV Push daily  dextrose 40% Gel 15 Gram(s) Oral once  dextrose 5%. 1000 milliLiter(s) IV Continuous <Continuous>  enoxaparin Injectable 100 milliGRAM(s) SubCutaneous two times a day  furosemide   Injectable 40 milliGRAM(s) IV Push daily  insulin glargine Injectable (LANTUS) 8 Unit(s) SubCutaneous at bedtime  insulin lispro (ADMELOG) corrective regimen sliding scale   SubCutaneous three times a day before meals  metoprolol tartrate 25 milliGRAM(s) Oral two times a day  pantoprazole    Tablet 40 milliGRAM(s) Oral before breakfast  senna 2 Tablet(s) Oral at bedtime    PRN Meds  acetaminophen   Tablet .. 650 milliGRAM(s) Oral every 6 hours PRN  guaifenesin/dextromethorphan  Syrup 10 milliLiter(s) Oral every 6 hours PRN  polyethylene glycol 3350 17 Gram(s) Oral daily PRN

## 2021-04-07 NOTE — PROGRESS NOTE ADULT - ASSESSMENT
56 year old F with PMHx of HTN, admitted for acute hypoxemic respiratory failure due to COVID PNA.    #Acute hypoxemic respiratory failure secondary to COVID-19 pneumonia  - COVID-19 PCR positive  - Isolation precautions (contact, droplet, airborne)  - Chest X-ray reveals improved bilateral opacities  - Patient is saturating 98% on HFNC 50/60  - LE Duplex negative 3/23, CT Angio 3/29 negative for PE  - LE duplex performed - bilateral soleal vein thrombosis, no deep DVTs, repeat revealed acute R gastrocnemius v DVT  - Initiated on full dose Lovenox  - s/p RDV, dexamethasone, course of Levaquin  - Resumed on Dexamethasone 6mg once daily   - DVT prophylaxis per protocol  - Titrate supplemental O2 to maintain SpO2 92-96%    #HTN  #Sinus tachycardia  - C/w Metoprolol 25 mg BID, HR improved    #DMII  - HbA1c 6.9%  - FS well controlled  - C/w Lantus 20 U    DVT ppx: Lovenox 100 mg BID  GI ppx: PPI  Diet: CC  Activity: IAT  Full code  Dispo: acute, c/w CEU monitoring, wean down HFNC as tolerated 56 year old F with PMHx of HTN, admitted for acute hypoxemic respiratory failure due to COVID PNA.    #Acute hypoxemic respiratory failure secondary to COVID-19 pneumonia  - COVID-19 PCR positive  - Isolation precautions (contact, droplet, airborne)  - Chest X-ray reveals improved bilateral opacities  - Patient is saturating 98% on HFNC 50/60  - LE Duplex negative 3/23, CT Angio 3/29 negative for PE  - LE duplex performed - bilateral soleal vein thrombosis, no deep DVTs, repeat revealed acute R gastrocnemius v DVT  - Initiated on full dose Lovenox  - s/p RDV, dexamethasone, course of Levaquin  - Resumed on Dexamethasone 6mg once daily   - DVT prophylaxis per protocol  - Titrate supplemental O2 to maintain SpO2 92-96%    #HTN  #Sinus tachycardia  - C/w Metoprolol 25 mg BID, HR improved    #DMII  - HbA1c 6.9%  - FS well controlled  - C/w Lantus 20 U    DVT ppx: Lovenox 100 mg BID  GI ppx: PPI  Diet: CC  Activity: IAT  Full code  Dispo: acute, c/w CEU monitoring, wean down HFNC as tolerated    Spoke to , provided medical update and answered questions.

## 2021-04-07 NOTE — PROGRESS NOTE ADULT - SUBJECTIVE AND OBJECTIVE BOX
OVERNIGHT EVENTS: events noted on [facility] 50/60, PAMELA doppler reviewed    Vital Signs Last 24 Hrs  T(C): 36.4 (07 Apr 2021 06:26), Max: 37.4 (06 Apr 2021 16:00)  T(F): 97.6 (07 Apr 2021 06:26), Max: 99.3 (06 Apr 2021 16:00)  HR: 114 (07 Apr 2021 06:26) (82 - 998)  BP: 128/60 (07 Apr 2021 06:26) (87/62 - 137/62)  BP(mean): 81 (06 Apr 2021 18:00) (81 - 81)  RR: 20 (07 Apr 2021 06:26) (18 - 20)  SpO2: 97% (07 Apr 2021 06:26) (97% - 100%)    PHYSICAL EXAMINATION:    GENERAL: ill looking    HEENT: Head is normocephalic and atraumatic.     NECK: Supple.    LUNGS: dec bs both bases    HEART: Regular rate and rhythm without murmur.    ABDOMEN: Soft, nontender, and nondistended.      EXTREMITIES: Without any cyanosis, clubbing, rash, lesions or edema.    NEUROLOGIC: Grossly intact.    SKIN: No ulceration or induration present.      LABS:                        12.1   9.21  )-----------( 275      ( 06 Apr 2021 09:02 )             39.6     04-06    139  |  100  |  13  ----------------------------<  103<H>  3.9   |  27  |  0.6<L>    Ca    9.3      06 Apr 2021 09:02    TPro  6.4  /  Alb  3.4<L>  /  TBili  0.3  /  DBili  x   /  AST  19  /  ALT  13  /  AlkPhos  68  04-06 04-06-21 @ 07:01  -  04-07-21 @ 07:00  --------------------------------------------------------  IN: 0 mL / OUT: 1300 mL / NET: -1300 mL        MICROBIOLOGY:      MEDICATIONS  (STANDING):  chlorhexidine 4% Liquid 1 Application(s) Topical daily  dexAMETHasone  Injectable 6 milliGRAM(s) IV Push daily  dextrose 40% Gel 15 Gram(s) Oral once  dextrose 5%. 1000 milliLiter(s) (50 mL/Hr) IV Continuous <Continuous>  enoxaparin Injectable 100 milliGRAM(s) SubCutaneous two times a day  furosemide   Injectable 40 milliGRAM(s) IV Push daily  insulin glargine Injectable (LANTUS) 8 Unit(s) SubCutaneous at bedtime  insulin lispro (ADMELOG) corrective regimen sliding scale   SubCutaneous three times a day before meals  metoprolol tartrate 25 milliGRAM(s) Oral two times a day  pantoprazole    Tablet 40 milliGRAM(s) Oral before breakfast  senna 2 Tablet(s) Oral at bedtime    MEDICATIONS  (PRN):  acetaminophen   Tablet .. 650 milliGRAM(s) Oral every 6 hours PRN Temp greater or equal to 38C (100.4F), Mild Pain (1 - 3)  guaifenesin/dextromethorphan  Syrup 10 milliLiter(s) Oral every 6 hours PRN Cough  polyethylene glycol 3350 17 Gram(s) Oral daily PRN Constipation      RADIOLOGY & ADDITIONAL STUDIES:

## 2021-04-07 NOTE — PROGRESS NOTE ADULT - ASSESSMENT
IMPRESSION:    Acute hypoxemic resp failure on HHFNC  Severe covid pneumonia SP TOCI and PLasma   Probable bacterial superinfection treated   Calf DVT/ high D dimer      PLAN:    CNS: Avoid CNS depressant    HEENT:  Oral care    PULMONARY:  HOB @ 45 degrees.  Wean O2 as tolerated. keep dexa , FIO2 to keep SAO2 88 to 94%    CARDIOVASCULAR: keep equal to negative balance.      GI: GI prophylaxis                                          Feeding po    RENAL:  F/u  lytes.  Correct as needed. accurate I/O    INFECTIOUS DISEASE: trend inf markers    HEMATOLOGICAL:  DVT Therapeutic lovenox    ENDOCRINE:  Follow up FS.  Insulin protocol if needed.    CODE STATUS: FULL CODE    DISPOSITION: SDU    Prognosis guarded

## 2021-04-07 NOTE — PROGRESS NOTE ADULT - SUBJECTIVE AND OBJECTIVE BOX
Patient Information:  PAULA SUMO / 56y / Female / MRN#:[id number]    Hospital Day: 28d    Interval History:  Patient seen and examined at bedside. No acute events overnight. Pt has decreased oxygen requirements today, is on HFNC 50/60 saturating 96-98%.    Past Medical History:    Past Surgical History:    Allergies:  Allergy Status Unknown    Medications:  PRN:  acetaminophen   Tablet .. 650 milliGRAM(s) Oral every 6 hours PRN Temp greater or equal to 38C (100.4F), Mild Pain (1 - 3)  guaifenesin/dextromethorphan  Syrup 10 milliLiter(s) Oral every 6 hours PRN Cough  polyethylene glycol 3350 17 Gram(s) Oral daily PRN Constipation    Standing:  chlorhexidine 4% Liquid 1 Application(s) Topical daily  dexAMETHasone  Injectable 6 milliGRAM(s) IV Push daily  dextrose 40% Gel 15 Gram(s) Oral once  dextrose 5%. 1000 milliLiter(s) (50 mL/Hr) IV Continuous <Continuous>  enoxaparin Injectable 100 milliGRAM(s) SubCutaneous two times a day  furosemide   Injectable 40 milliGRAM(s) IV Push daily  insulin glargine Injectable (LANTUS) 8 Unit(s) SubCutaneous at bedtime  insulin lispro (ADMELOG) corrective regimen sliding scale   SubCutaneous three times a day before meals  metoprolol tartrate 25 milliGRAM(s) Oral two times a day  pantoprazole    Tablet 40 milliGRAM(s) Oral before breakfast  senna 2 Tablet(s) Oral at bedtime    Home:    Vitals:  T(C): 36.8, Max: 37.4 (04-06-21 @ 16:00)  T(F): 98.3, Max: 99.3 (04-06-21 @ 16:00)  HR: 85 (82 - 998)  BP: 127/61 (87/62 - 137/62)  RR: 20 (18 - 20)  SpO2: 98% (97% - 100%)    Physical Exam:  General: Awake, alert, NAD, resting in bed on HFNC 50/60%  HEENT: Head NC/AT  Heart: Regular rhythm, rate; loud systolic murmur best heard at LUSB  Lungs: Good air entry  Abdomen: Soft, nontender, nondistended, BS+  Extremities: No edema, clubbing, cyanosis in upper or lower extremities  Neuro: AAOx3, NFD    Labs:  CBC (04-06 @ 09:02)                        Hgb: 12.1   WBC: 9.21  )-----------------( Plts: 275                              Hct: 39.6     Chem (04-06 @ 09:02)  Na: 139  |     Cl: 100     |  BUN: 13  -----------------------------------------< Gluc: 103    K: 3.9   |    HCO3: 27  |  Cr: 0.6    Ca 9.3 (04-06 @ 09:02)    LFTs (04-06 @ 09:02)  TPro 6.4  /  Alb 3.4  TBili 0.3  /  DBili     AST 19  /  ALT 13  /  AlkPhos 68         Patient Information:  PAULA SUMO / 56y / Female / MRN#:[id number]    Hospital Day: 28d    Interval History:  Patient seen and examined at bedside. No acute events overnight. Pt has decreased oxygen requirements today, is on HFNC 50/60 saturating 96-98%.    Past Medical History:    Past Surgical History:    Allergies:  Allergy Status Unknown    Medications:  PRN:  acetaminophen   Tablet .. 650 milliGRAM(s) Oral every 6 hours PRN Temp greater or equal to 38C (100.4F), Mild Pain (1 - 3)  guaifenesin/dextromethorphan  Syrup 10 milliLiter(s) Oral every 6 hours PRN Cough  polyethylene glycol 3350 17 Gram(s) Oral daily PRN Constipation    Standing:  chlorhexidine 4% Liquid 1 Application(s) Topical daily  dexAMETHasone  Injectable 6 milliGRAM(s) IV Push daily  dextrose 40% Gel 15 Gram(s) Oral once  dextrose 5%. 1000 milliLiter(s) (50 mL/Hr) IV Continuous <Continuous>  enoxaparin Injectable 100 milliGRAM(s) SubCutaneous two times a day  furosemide   Injectable 40 milliGRAM(s) IV Push daily  insulin glargine Injectable (LANTUS) 8 Unit(s) SubCutaneous at bedtime  insulin lispro (ADMELOG) corrective regimen sliding scale   SubCutaneous three times a day before meals  metoprolol tartrate 25 milliGRAM(s) Oral two times a day  pantoprazole    Tablet 40 milliGRAM(s) Oral before breakfast  senna 2 Tablet(s) Oral at bedtime    Home:    Vitals:  T(C): 36.8, Max: 37.4 (04-06-21 @ 16:00)  T(F): 98.3, Max: 99.3 (04-06-21 @ 16:00)  HR: 85 (82 - 998)  BP: 127/61 (87/62 - 137/62)  RR: 20 (18 - 20)  SpO2: 98% (97% - 100%)    Physical Exam:  General: Awake, alert, NAD, resting in bed on HFNC 50/60%  HEENT: Head NC/AT  Heart: Regular rhythm, rate; loud systolic murmur best heard at LUSB  Lungs: Good air entry  Abdomen: Soft, nontender, nondistended, BS+  Extremities: No edema, clubbing, cyanosis in upper or lower extremities      Labs:  CBC (04-06 @ 09:02)                        Hgb: 12.1   WBC: 9.21  )-----------------( Plts: 275                              Hct: 39.6     Chem (04-06 @ 09:02)  Na: 139  |     Cl: 100     |  BUN: 13  -----------------------------------------< Gluc: 103    K: 3.9   |    HCO3: 27  |  Cr: 0.6    Ca 9.3 (04-06 @ 09:02)    LFTs (04-06 @ 09:02)  TPro 6.4  /  Alb 3.4  TBili 0.3  /  DBili     AST 19  /  ALT 13  /  AlkPhos 68

## 2021-04-07 NOTE — PROGRESS NOTE ADULT - ASSESSMENT
Patient is a 56-year-old, female patient, PMHx: Hypertension, presented to the ED for Shortness of breath and found to have COVID19 PNA    Acute Hypoxic Respiratory Failure secondary to COVID 19 PNA  Currently on HFNC 60/60% saturating 98%  s/p RDV therapy and coarse of decadron, end 3/31, s/p Levaquin x7d (03/12-03/19)  CTA 03/29: No PE  LE Duplex (04/01): b/l soleal vein thrombosis  BNP 04/02 61  On therapeutic Lovenox  dc IV lasix  Taper down oxygen requirements as tolerated    HTN  Sinus tachycardia  Metoprolol tartrate 25 BID    DM2, new onset  Hba1c 6.9 (03/12)  Lantus/lispro 20-3-3-3 with ISS  Once downgraded, endocrine consult for outpt tx recommendations    Patient requires continuous monitoring in CEU

## 2021-04-08 LAB
ALBUMIN SERPL ELPH-MCNC: 4 G/DL — SIGNIFICANT CHANGE UP (ref 3.5–5.2)
ALP SERPL-CCNC: 79 U/L — SIGNIFICANT CHANGE UP (ref 30–115)
ALT FLD-CCNC: 19 U/L — SIGNIFICANT CHANGE UP (ref 0–41)
ANION GAP SERPL CALC-SCNC: 14 MMOL/L — SIGNIFICANT CHANGE UP (ref 7–14)
AST SERPL-CCNC: 23 U/L — SIGNIFICANT CHANGE UP (ref 0–41)
BASOPHILS # BLD AUTO: 0.04 K/UL — SIGNIFICANT CHANGE UP (ref 0–0.2)
BASOPHILS NFR BLD AUTO: 0.4 % — SIGNIFICANT CHANGE UP (ref 0–1)
BILIRUB SERPL-MCNC: 0.2 MG/DL — SIGNIFICANT CHANGE UP (ref 0.2–1.2)
BUN SERPL-MCNC: 27 MG/DL — HIGH (ref 10–20)
CALCIUM SERPL-MCNC: 9.7 MG/DL — SIGNIFICANT CHANGE UP (ref 8.5–10.1)
CHLORIDE SERPL-SCNC: 100 MMOL/L — SIGNIFICANT CHANGE UP (ref 98–110)
CO2 SERPL-SCNC: 27 MMOL/L — SIGNIFICANT CHANGE UP (ref 17–32)
CREAT SERPL-MCNC: 0.8 MG/DL — SIGNIFICANT CHANGE UP (ref 0.7–1.5)
CRP SERPL-MCNC: 47 MG/L — HIGH
EOSINOPHIL # BLD AUTO: 0.13 K/UL — SIGNIFICANT CHANGE UP (ref 0–0.7)
EOSINOPHIL NFR BLD AUTO: 1.2 % — SIGNIFICANT CHANGE UP (ref 0–8)
FERRITIN SERPL-MCNC: 691 NG/ML — HIGH (ref 15–150)
GLUCOSE BLDC GLUCOMTR-MCNC: 130 MG/DL — HIGH (ref 70–99)
GLUCOSE BLDC GLUCOMTR-MCNC: 145 MG/DL — HIGH (ref 70–99)
GLUCOSE BLDC GLUCOMTR-MCNC: 169 MG/DL — HIGH (ref 70–99)
GLUCOSE SERPL-MCNC: 126 MG/DL — HIGH (ref 70–99)
HCT VFR BLD CALC: 42.6 % — SIGNIFICANT CHANGE UP (ref 37–47)
HGB BLD-MCNC: 13 G/DL — SIGNIFICANT CHANGE UP (ref 12–16)
IMM GRANULOCYTES NFR BLD AUTO: 1.2 % — HIGH (ref 0.1–0.3)
LYMPHOCYTES # BLD AUTO: 1.15 K/UL — LOW (ref 1.2–3.4)
LYMPHOCYTES # BLD AUTO: 10.8 % — LOW (ref 20.5–51.1)
MCHC RBC-ENTMCNC: 28.3 PG — SIGNIFICANT CHANGE UP (ref 27–31)
MCHC RBC-ENTMCNC: 30.5 G/DL — LOW (ref 32–37)
MCV RBC AUTO: 92.6 FL — SIGNIFICANT CHANGE UP (ref 81–99)
MONOCYTES # BLD AUTO: 0.54 K/UL — SIGNIFICANT CHANGE UP (ref 0.1–0.6)
MONOCYTES NFR BLD AUTO: 5.1 % — SIGNIFICANT CHANGE UP (ref 1.7–9.3)
NEUTROPHILS # BLD AUTO: 8.67 K/UL — HIGH (ref 1.4–6.5)
NEUTROPHILS NFR BLD AUTO: 81.3 % — HIGH (ref 42.2–75.2)
NRBC # BLD: 0 /100 WBCS — SIGNIFICANT CHANGE UP (ref 0–0)
PLATELET # BLD AUTO: 350 K/UL — SIGNIFICANT CHANGE UP (ref 130–400)
POTASSIUM SERPL-MCNC: 3.4 MMOL/L — LOW (ref 3.5–5)
POTASSIUM SERPL-SCNC: 3.4 MMOL/L — LOW (ref 3.5–5)
PROCALCITONIN SERPL-MCNC: 0.03 NG/ML — SIGNIFICANT CHANGE UP (ref 0.02–0.1)
PROT SERPL-MCNC: 7.3 G/DL — SIGNIFICANT CHANGE UP (ref 6–8)
RBC # BLD: 4.6 M/UL — SIGNIFICANT CHANGE UP (ref 4.2–5.4)
RBC # FLD: 12.7 % — SIGNIFICANT CHANGE UP (ref 11.5–14.5)
SODIUM SERPL-SCNC: 141 MMOL/L — SIGNIFICANT CHANGE UP (ref 135–146)
WBC # BLD: 10.66 K/UL — SIGNIFICANT CHANGE UP (ref 4.8–10.8)
WBC # FLD AUTO: 10.66 K/UL — SIGNIFICANT CHANGE UP (ref 4.8–10.8)

## 2021-04-08 PROCEDURE — 99232 SBSQ HOSP IP/OBS MODERATE 35: CPT

## 2021-04-08 PROCEDURE — 71045 X-RAY EXAM CHEST 1 VIEW: CPT | Mod: 26

## 2021-04-08 RX ORDER — FUROSEMIDE 40 MG
40 TABLET ORAL DAILY
Refills: 0 | Status: DISCONTINUED | OUTPATIENT
Start: 2021-04-08 | End: 2021-04-10

## 2021-04-08 RX ORDER — POTASSIUM CHLORIDE 20 MEQ
40 PACKET (EA) ORAL
Refills: 0 | Status: COMPLETED | OUTPATIENT
Start: 2021-04-08 | End: 2021-04-08

## 2021-04-08 RX ADMIN — ENOXAPARIN SODIUM 100 MILLIGRAM(S): 100 INJECTION SUBCUTANEOUS at 06:05

## 2021-04-08 RX ADMIN — Medication 40 MILLIGRAM(S): at 06:05

## 2021-04-08 RX ADMIN — Medication 25 MILLIGRAM(S): at 06:05

## 2021-04-08 RX ADMIN — PANTOPRAZOLE SODIUM 40 MILLIGRAM(S): 20 TABLET, DELAYED RELEASE ORAL at 06:05

## 2021-04-08 RX ADMIN — Medication 40 MILLIEQUIVALENT(S): at 11:29

## 2021-04-08 RX ADMIN — Medication 40 MILLIEQUIVALENT(S): at 15:06

## 2021-04-08 RX ADMIN — ENOXAPARIN SODIUM 100 MILLIGRAM(S): 100 INJECTION SUBCUTANEOUS at 17:44

## 2021-04-08 RX ADMIN — Medication 25 MILLIGRAM(S): at 17:44

## 2021-04-08 RX ADMIN — CHLORHEXIDINE GLUCONATE 1 APPLICATION(S): 213 SOLUTION TOPICAL at 06:02

## 2021-04-08 RX ADMIN — Medication 1: at 11:29

## 2021-04-08 RX ADMIN — SENNA PLUS 2 TABLET(S): 8.6 TABLET ORAL at 21:13

## 2021-04-08 RX ADMIN — Medication 6 MILLIGRAM(S): at 06:05

## 2021-04-08 RX ADMIN — INSULIN GLARGINE 8 UNIT(S): 100 INJECTION, SOLUTION SUBCUTANEOUS at 21:14

## 2021-04-08 NOTE — PROGRESS NOTE ADULT - SUBJECTIVE AND OBJECTIVE BOX
Patient Information:  PAULA SUMO / 56y / Female / MRN#:[id number]    Hospital Day: 29d    Interval History:  Patient seen and examined at bedside. No acute events overnight. Pt is sitting up in bed, on HFNC 50/60. Attempted to stand up and ambulate to toilet but was very unsteady with 2 person assistance.    Past Medical History:    Past Surgical History:    Allergies:  Allergy Status Unknown    Medications:  PRN:  acetaminophen   Tablet .. 650 milliGRAM(s) Oral every 6 hours PRN Temp greater or equal to 38C (100.4F), Mild Pain (1 - 3)  guaifenesin/dextromethorphan  Syrup 10 milliLiter(s) Oral every 6 hours PRN Cough  polyethylene glycol 3350 17 Gram(s) Oral daily PRN Constipation    Standing:  chlorhexidine 4% Liquid 1 Application(s) Topical daily  dexAMETHasone  Injectable 6 milliGRAM(s) IV Push daily  dextrose 40% Gel 15 Gram(s) Oral once  dextrose 5%. 1000 milliLiter(s) (50 mL/Hr) IV Continuous <Continuous>  enoxaparin Injectable 100 milliGRAM(s) SubCutaneous two times a day  insulin glargine Injectable (LANTUS) 8 Unit(s) SubCutaneous at bedtime  insulin lispro (ADMELOG) corrective regimen sliding scale   SubCutaneous three times a day before meals  metoprolol tartrate 25 milliGRAM(s) Oral two times a day  pantoprazole    Tablet 40 milliGRAM(s) Oral before breakfast  senna 2 Tablet(s) Oral at bedtime    Home:    Vitals:  T(C): 36.8, Max: 37 (04-07-21 @ 12:00)  T(F): 98.3, Max: 98.6 (04-07-21 @ 12:00)  HR: 98 (79 - 107)  BP: 152/67 (109/58 - 152/67)  RR: 20 (20 - 20)  SpO2: 95% (95% - 99%)    Physical Exam:  General: Awake, alert, NAD, sitting up in bed on HFNC 50/60%  HEENT: Head NC/AT  Heart: Regular rhythm, rate; loud systolic murmur best heard at LUSB  Lungs: Good air entry  Abdomen: Soft, nontender, nondistended, BS+  Extremities: No edema, clubbing, cyanosis in upper or lower extremities    Labs:  CBC (04-08 @ 08:04)                        Hgb: 13.0   WBC: 10.66 )-----------------( Plts: 350                              Hct: 42.6     Chem (04-07 @ 07:29)  Na: 141  |     Cl: 99     |  BUN: 20  -----------------------------------------< Gluc: 99    K: 4.0   |    HCO3: 29  |  Cr: 0.7    Ca 9.6 (04-07 @ 07:29)    LFTs (04-07 @ 07:29)  TPro 6.9  /  Alb 3.9  TBili 0.3  /  DBili     AST 19  /  ALT 15  /  AlkPhos 78             Patient Information:  PAULA SUMO / 56y / Female / MRN#:[id number]    Hospital Day: 29d    Interval History:  Patient seen and examined at bedside. No acute events overnight. Pt is sitting up in bed, on HFNC 50/60. Attempted to stand up and ambulate to toilet but was very unsteady with 2 person assistance.  Per nurse, pt does not remember what year it is, keeps saying it is [birth year] (her birthday).  Called pt's  at bedside, states that she occasionally gets confused, forgets the year, but they never sought medical attention for this. Pt remains oriented to person, place, situation.    Past Medical History:    Past Surgical History:    Allergies:  Allergy Status Unknown    Medications:  PRN:  acetaminophen   Tablet .. 650 milliGRAM(s) Oral every 6 hours PRN Temp greater or equal to 38C (100.4F), Mild Pain (1 - 3)  guaifenesin/dextromethorphan  Syrup 10 milliLiter(s) Oral every 6 hours PRN Cough  polyethylene glycol 3350 17 Gram(s) Oral daily PRN Constipation    Standing:  chlorhexidine 4% Liquid 1 Application(s) Topical daily  dexAMETHasone  Injectable 6 milliGRAM(s) IV Push daily  dextrose 40% Gel 15 Gram(s) Oral once  dextrose 5%. 1000 milliLiter(s) (50 mL/Hr) IV Continuous <Continuous>  enoxaparin Injectable 100 milliGRAM(s) SubCutaneous two times a day  insulin glargine Injectable (LANTUS) 8 Unit(s) SubCutaneous at bedtime  insulin lispro (ADMELOG) corrective regimen sliding scale   SubCutaneous three times a day before meals  metoprolol tartrate 25 milliGRAM(s) Oral two times a day  pantoprazole    Tablet 40 milliGRAM(s) Oral before breakfast  senna 2 Tablet(s) Oral at bedtime    Home:    Vitals:  T(C): 36.8, Max: 37 (04-07-21 @ 12:00)  T(F): 98.3, Max: 98.6 (04-07-21 @ 12:00)  HR: 98 (79 - 107)  BP: 152/67 (109/58 - 152/67)  RR: 20 (20 - 20)  SpO2: 95% (95% - 99%)    Physical Exam:  General: Awake, alert, NAD, sitting up in bed on HFNC 50/60%  HEENT: Head NC/AT  Heart: Regular rhythm, rate; loud systolic murmur best heard at LUSB  Lungs: Good air entry  Abdomen: Soft, nontender, nondistended, BS+  Extremities: No edema, clubbing, cyanosis in upper or lower extremities    Labs:  CBC (04-08 @ 08:04)                        Hgb: 13.0   WBC: 10.66 )-----------------( Plts: 350                              Hct: 42.6     Chem (04-07 @ 07:29)  Na: 141  |     Cl: 99     |  BUN: 20  -----------------------------------------< Gluc: 99    K: 4.0   |    HCO3: 29  |  Cr: 0.7    Ca 9.6 (04-07 @ 07:29)    LFTs (04-07 @ 07:29)  TPro 6.9  /  Alb 3.9  TBili 0.3  /  DBili     AST 19  /  ALT 15  /  AlkPhos 78

## 2021-04-08 NOTE — PROGRESS NOTE ADULT - ASSESSMENT
IMPRESSION:    Acute hypoxemic resp failure on HHFNC 60%  Severe covid pneumonia SP TOCI and PLasma   Probable bacterial superinfection treated   Calf DVT/ high D dimer      PLAN:    CNS: Avoid CNS depressant    HEENT:  Oral care    PULMONARY:  HOB @ 45 degrees.  Wean O2 as tolerated. keep dexa , FIO2 to keep SAO2 88 to 94%    CARDIOVASCULAR: keep equal to negative balance.      GI: GI prophylaxis                                          Feeding po    RENAL:  F/u  lytes.  Correct as needed. accurate I/O    INFECTIOUS DISEASE: trend inf markers    HEMATOLOGICAL:  DVT Therapeutic lovenox    ENDOCRINE:  Follow up FS.  Insulin protocol if needed.    CODE STATUS: FULL CODE    DISPOSITION: SDU    Prognosis guarded

## 2021-04-08 NOTE — PROGRESS NOTE ADULT - SUBJECTIVE AND OBJECTIVE BOX
OVERNIGHT EVENTS: events noted, on HHFNC 60%, afebrile    Vital Signs Last 24 Hrs  T(C): 36.8 (08 Apr 2021 05:05), Max: 37 (07 Apr 2021 12:00)  T(F): 98.3 (08 Apr 2021 05:05), Max: 98.6 (07 Apr 2021 12:00)  HR: 98 (08 Apr 2021 05:05) (79 - 107)  BP: 152/67 (08 Apr 2021 05:05) (109/58 - 152/67)  BP(mean): --  RR: 20 (08 Apr 2021 05:05) (20 - 20)  SpO2: 95% (08 Apr 2021 05:05) (95% - 99%)    PHYSICAL EXAMINATION:    GENERAL: ill looking    HEENT: Head is normocephalic and atraumatic.    NECK: Supple.    LUNGS: bl rhonchi    HEART: Regular rate and rhythm without murmur.    ABDOMEN: Soft, nontender, and nondistended.      EXTREMITIES: Without any cyanosis, clubbing, rash, lesions or edema.    NEUROLOGIC: Grossly intact.    SKIN: No ulceration or induration present.      LABS:                        12.8   10.67 )-----------( 319      ( 07 Apr 2021 07:29 )             41.3     04-07    141  |  99  |  20  ----------------------------<  99  4.0   |  29  |  0.7    Ca    9.6      07 Apr 2021 07:29    TPro  6.9  /  Alb  3.9  /  TBili  0.3  /  DBili  x   /  AST  19  /  ALT  15  /  AlkPhos  78  04-07                    Procalcitonin, Serum: 0.03 ng/mL (04-07-21 @ 07:29)        04-06-21 @ 07:01  -  04-07-21 @ 07:00  --------------------------------------------------------  IN: 0 mL / OUT: 1300 mL / NET: -1300 mL        MICROBIOLOGY:      MEDICATIONS  (STANDING):  chlorhexidine 4% Liquid 1 Application(s) Topical daily  dexAMETHasone  Injectable 6 milliGRAM(s) IV Push daily  dextrose 40% Gel 15 Gram(s) Oral once  dextrose 5%. 1000 milliLiter(s) (50 mL/Hr) IV Continuous <Continuous>  enoxaparin Injectable 100 milliGRAM(s) SubCutaneous two times a day  furosemide   Injectable 40 milliGRAM(s) IV Push daily  insulin glargine Injectable (LANTUS) 8 Unit(s) SubCutaneous at bedtime  insulin lispro (ADMELOG) corrective regimen sliding scale   SubCutaneous three times a day before meals  metoprolol tartrate 25 milliGRAM(s) Oral two times a day  pantoprazole    Tablet 40 milliGRAM(s) Oral before breakfast  senna 2 Tablet(s) Oral at bedtime    MEDICATIONS  (PRN):  acetaminophen   Tablet .. 650 milliGRAM(s) Oral every 6 hours PRN Temp greater or equal to 38C (100.4F), Mild Pain (1 - 3)  guaifenesin/dextromethorphan  Syrup 10 milliLiter(s) Oral every 6 hours PRN Cough  polyethylene glycol 3350 17 Gram(s) Oral daily PRN Constipation      RADIOLOGY & ADDITIONAL STUDIES:

## 2021-04-08 NOTE — CHART NOTE - NSCHARTNOTEFT_GEN_A_CORE
Registered Dietitian Follow-Up     Patient Profile Reviewed                           Yes [x]   No []     Nutrition History Previously Obtained        Yes [x]  No []      Pertinent Subjective Information: unable to conduct face to face assessment d/t COVID-19 isolation precautions. Attempted to call pt room phone >3 attempts but no response. As per RN pt had little to no intake at both breakfast & lunch today. Pt previously not at risk d/t adequate intake but will f/u as at risk reassessment at this time d/t change in intake. Today is RN first day w. pt so unsure if this pt new norm, no EMR documentation of intake. RN reports pt did not drink Glucerna either, only had juice & water. No c/o n/v/ abd pain per RN.     Pertinent Medical Interventions:   1. Acute hypoxemic respiratory failure 2/2 COVID-19 PNA--satting 98% on HFNC 50L/60. stable b/l opacities on 4/2 CXR   2. T2DM--lantus 20u      Diet order: DASH/TLC + Consistent Carbohydrate diet evening snack + Glucerna TID      Anthropometrics:  - Ht. 172.7cm  - Wt. 99.6kg (3/11/21) - no new wt at this time  - BMI 33.4  - IBW 63.6kg      Pertinent Lab Data: (4/8/21) POCT 169, glucose 126, K+ 3.4, BUN 27     Pertinent Meds: lovenox, lantus, SSI, lasix, KCl powder, decadron, metoprolol, miralax, senna, protonix      Physical Findings:  - Appearance: alert, somewhat confused today per RN   - GI function: none per RN, LBM 4/8   - Tubes: no feeding tubes  - Oral/Mouth cavity: none reported   - Skin: intact; 2+ edema to b/l legs & b/l ankles      Nutrition Requirements  Weight Used: 63.6kg IBW d/t BMI >30 (continued per 3/20 RD assessment)      Energy: 1421-4163 kcal/day (25-30 kcal/kg IBW).   Protein: 76-89 g/day (1.2-1.4 g/kg IBW).   Fluids: 1 mL/kcal or per LIP     Nutrient Intake: little to no intake today as per RN      New Nutrition Diagnosis  P: inadequate protein/energy intake  E: COVID-19 w. critical illness vs. confusion vs. poor appetite  S: <25% intake today      Nutrition Intervention: Meals & Snacks, medical food supplements, nutrition related medication mgmt  Recommend:  1. If not contraindicated, consider appetite stimulant medication.   2. Consider d/c DASH/TLC modifier to optimize choice at meal times. Continue Consistent Carbohydrate + Glucerna TID.  3. Monitor/replete serum K+.   4. Consider adding daily multivitamin w. minerals.      Goal/Expected Outcome: Pt to consume at least 50% both meals & supplements upon f/u in 4 days.      Indicator/Monitoring: diet order, energy intake, nutrition related labs, body composition, NFPF.

## 2021-04-08 NOTE — PROGRESS NOTE ADULT - ASSESSMENT
56 year old F with PMHx of HTN, admitted for acute hypoxemic respiratory failure due to COVID PNA.    #Acute hypoxemic respiratory failure secondary to COVID-19 pneumonia  - COVID-19 PCR positive  - Isolation precautions (contact, droplet, airborne)  - Chest X-ray 4/2 reveals stable bilateral opacities  - Patient is saturating 98% on HFNC 50L/60%  - LE Duplex negative 3/23, CT Angio 3/29 negative for PE  - LE duplex performed - bilateral soleal vein thrombosis, no deep DVTs, repeat LE duplex revealed R gastrocnemius DVT   - s/p RDV, dexamethasone, course of Levaquin  - C/w Dexamethasone 6mg once daily   - DVT prophylaxis per protocol  - Titrate supplemental O2 to maintain SpO2 92-96%    #HTN  #Sinus tachycardia  - C/w Metoprolol 25 mg BID    #DMII  - HbA1c 6.9%  - FS well controlled  - C/w Lantus 20 U    DVT ppx: Lovenox 40 mg BID  GI ppx: PPI  Diet: CC  Activity: IAT  Full code  Dispo: acute, c/w CEU monitoring, wean down HFNC as tolerated  56 year old F with PMHx of HTN, admitted for acute hypoxemic respiratory failure due to COVID PNA.    #Acute hypoxemic respiratory failure secondary to COVID-19 pneumonia  - COVID-19 PCR positive  - Isolation precautions (contact, droplet, airborne)  - Chest X-ray 4/2 reveals stable bilateral opacities  - Patient is saturating 98% on HFNC 50L/60%  - LE Duplex negative 3/23, CT Angio 3/29 negative for PE  - LE duplex performed - bilateral soleal vein thrombosis, no deep DVTs, repeat LE duplex revealed R gastrocnemius DVT   - s/p RDV, dexamethasone, course of Levaquin  - C/w Dexamethasone 6mg once daily   - DVT prophylaxis per protocol  - Titrate supplemental O2 to maintain SpO2 92-96%    #HTN  #Sinus tachycardia  - C/w Metoprolol 25 mg BID    #DMII  - HbA1c 6.9%  - FS well controlled  - C/w Lantus 20 U    DVT ppx: Lovenox 40 mg BID  GI ppx: PPI  Diet: CC  Activity: IAT  Full code  Dispo: acute, c/w CEU monitoring, wean down HFNC as tolerated     Called pt's , provided medical update and answered questions. 56 year old F with PMHx of HTN, admitted for acute hypoxemic respiratory failure due to COVID PNA.    #Acute hypoxemic respiratory failure secondary to COVID-19 pneumonia  - COVID-19 PCR positive  - Isolation precautions (contact, droplet, airborne)  - Chest X-ray 4/2 reveals stable bilateral opacities  - Patient is saturating 98% on HFNC 50L/60%  - LE Duplex negative 3/23, CT Angio 3/29 negative for PE  - LE duplex performed - bilateral soleal vein thrombosis, no deep DVTs, repeat LE duplex revealed R gastrocnemius DVT   - s/p RDV, dexamethasone, course of Levaquin  - C/w Dexamethasone 6mg once daily   - DVT prophylaxis per protocol  - Titrate supplemental O2 to maintain SpO2 92-96%    #HTN  #Sinus tachycardia  - C/w Metoprolol 25 mg BID    #DMII  - HbA1c 6.9%  - FS well controlled  - C/w Lantus 20 U    DVT ppx: Lovenox 100 mg BID  GI ppx: PPI  Diet: CC  Activity: IAT  Full code  Dispo: acute, c/w CEU monitoring, wean down HFNC as tolerated     Called pt's , provided medical update and answered questions.

## 2021-04-08 NOTE — PROGRESS NOTE ADULT - ASSESSMENT
Patient is a 56-year-old, female patient, PMHx: Hypertension, presented to the ED for Shortness of breath and found to have COVID19 PNA    Acute Hypoxic Respiratory Failure secondary to COVID 19 PNA  Currently on HFNC 40/40% saturating 97%  s/p RDV therapy and s/p Levaquin x7d (03/12-03/19)  CTA 03/29: No PE  LE Duplex (04/01): b/l soleal vein thrombosis  BNP 04/02 60  On therapeutic Lovenox   continue with Decadron 6mg daily   Taper down oxygen requirements as tolerated    HTN  Sinus tachycardia  Metoprolol tartrate 25 BID    DM2, new onset  Hba1c 6.9 (03/12)  Lantus 8units with ISS  Once downgraded, endocrine consult for outpt tx recommendations    Patient requires continuous monitoring in CEU

## 2021-04-09 LAB
ALBUMIN SERPL ELPH-MCNC: 3.9 G/DL — SIGNIFICANT CHANGE UP (ref 3.5–5.2)
ALP SERPL-CCNC: 76 U/L — SIGNIFICANT CHANGE UP (ref 30–115)
ALT FLD-CCNC: 25 U/L — SIGNIFICANT CHANGE UP (ref 0–41)
ANION GAP SERPL CALC-SCNC: 13 MMOL/L — SIGNIFICANT CHANGE UP (ref 7–14)
AST SERPL-CCNC: 26 U/L — SIGNIFICANT CHANGE UP (ref 0–41)
BASOPHILS # BLD AUTO: 0.04 K/UL — SIGNIFICANT CHANGE UP (ref 0–0.2)
BASOPHILS NFR BLD AUTO: 0.4 % — SIGNIFICANT CHANGE UP (ref 0–1)
BILIRUB SERPL-MCNC: 0.3 MG/DL — SIGNIFICANT CHANGE UP (ref 0.2–1.2)
BUN SERPL-MCNC: 27 MG/DL — HIGH (ref 10–20)
CALCIUM SERPL-MCNC: 9.7 MG/DL — SIGNIFICANT CHANGE UP (ref 8.5–10.1)
CHLORIDE SERPL-SCNC: 99 MMOL/L — SIGNIFICANT CHANGE UP (ref 98–110)
CO2 SERPL-SCNC: 27 MMOL/L — SIGNIFICANT CHANGE UP (ref 17–32)
CREAT SERPL-MCNC: 0.8 MG/DL — SIGNIFICANT CHANGE UP (ref 0.7–1.5)
EOSINOPHIL # BLD AUTO: 0.12 K/UL — SIGNIFICANT CHANGE UP (ref 0–0.7)
EOSINOPHIL NFR BLD AUTO: 1.1 % — SIGNIFICANT CHANGE UP (ref 0–8)
GLUCOSE BLDC GLUCOMTR-MCNC: 141 MG/DL — HIGH (ref 70–99)
GLUCOSE BLDC GLUCOMTR-MCNC: 141 MG/DL — HIGH (ref 70–99)
GLUCOSE BLDC GLUCOMTR-MCNC: 150 MG/DL — HIGH (ref 70–99)
GLUCOSE BLDC GLUCOMTR-MCNC: 95 MG/DL — SIGNIFICANT CHANGE UP (ref 70–99)
GLUCOSE SERPL-MCNC: 186 MG/DL — HIGH (ref 70–99)
HCT VFR BLD CALC: 42.5 % — SIGNIFICANT CHANGE UP (ref 37–47)
HGB BLD-MCNC: 13.1 G/DL — SIGNIFICANT CHANGE UP (ref 12–16)
IMM GRANULOCYTES NFR BLD AUTO: 1.2 % — HIGH (ref 0.1–0.3)
LYMPHOCYTES # BLD AUTO: 0.97 K/UL — LOW (ref 1.2–3.4)
LYMPHOCYTES # BLD AUTO: 8.6 % — LOW (ref 20.5–51.1)
MCHC RBC-ENTMCNC: 28.3 PG — SIGNIFICANT CHANGE UP (ref 27–31)
MCHC RBC-ENTMCNC: 30.8 G/DL — LOW (ref 32–37)
MCV RBC AUTO: 91.8 FL — SIGNIFICANT CHANGE UP (ref 81–99)
MONOCYTES # BLD AUTO: 0.38 K/UL — SIGNIFICANT CHANGE UP (ref 0.1–0.6)
MONOCYTES NFR BLD AUTO: 3.4 % — SIGNIFICANT CHANGE UP (ref 1.7–9.3)
NEUTROPHILS # BLD AUTO: 9.6 K/UL — HIGH (ref 1.4–6.5)
NEUTROPHILS NFR BLD AUTO: 85.3 % — HIGH (ref 42.2–75.2)
NRBC # BLD: 0 /100 WBCS — SIGNIFICANT CHANGE UP (ref 0–0)
PLATELET # BLD AUTO: 378 K/UL — SIGNIFICANT CHANGE UP (ref 130–400)
POTASSIUM SERPL-MCNC: 3.7 MMOL/L — SIGNIFICANT CHANGE UP (ref 3.5–5)
POTASSIUM SERPL-SCNC: 3.7 MMOL/L — SIGNIFICANT CHANGE UP (ref 3.5–5)
PROT SERPL-MCNC: 7.2 G/DL — SIGNIFICANT CHANGE UP (ref 6–8)
RBC # BLD: 4.63 M/UL — SIGNIFICANT CHANGE UP (ref 4.2–5.4)
RBC # FLD: 12.8 % — SIGNIFICANT CHANGE UP (ref 11.5–14.5)
SODIUM SERPL-SCNC: 139 MMOL/L — SIGNIFICANT CHANGE UP (ref 135–146)
WBC # BLD: 11.25 K/UL — HIGH (ref 4.8–10.8)
WBC # FLD AUTO: 11.25 K/UL — HIGH (ref 4.8–10.8)

## 2021-04-09 PROCEDURE — 99232 SBSQ HOSP IP/OBS MODERATE 35: CPT

## 2021-04-09 RX ORDER — NIFEDIPINE 30 MG
30 TABLET, EXTENDED RELEASE 24 HR ORAL DAILY
Refills: 0 | Status: DISCONTINUED | OUTPATIENT
Start: 2021-04-09 | End: 2021-04-16

## 2021-04-09 RX ADMIN — ENOXAPARIN SODIUM 100 MILLIGRAM(S): 100 INJECTION SUBCUTANEOUS at 06:43

## 2021-04-09 RX ADMIN — PANTOPRAZOLE SODIUM 40 MILLIGRAM(S): 20 TABLET, DELAYED RELEASE ORAL at 06:44

## 2021-04-09 RX ADMIN — ENOXAPARIN SODIUM 100 MILLIGRAM(S): 100 INJECTION SUBCUTANEOUS at 17:33

## 2021-04-09 RX ADMIN — Medication 25 MILLIGRAM(S): at 17:33

## 2021-04-09 RX ADMIN — INSULIN GLARGINE 8 UNIT(S): 100 INJECTION, SOLUTION SUBCUTANEOUS at 22:14

## 2021-04-09 RX ADMIN — Medication 1 TABLET(S): at 12:39

## 2021-04-09 RX ADMIN — Medication 6 MILLIGRAM(S): at 06:43

## 2021-04-09 RX ADMIN — Medication 25 MILLIGRAM(S): at 06:42

## 2021-04-09 RX ADMIN — Medication 40 MILLIGRAM(S): at 06:43

## 2021-04-09 RX ADMIN — Medication 30 MILLIGRAM(S): at 17:33

## 2021-04-09 NOTE — PROGRESS NOTE ADULT - ASSESSMENT
56 year old F with PMHx of HTN, admitted for acute hypoxemic respiratory failure due to COVID PNA.    #Acute hypoxemic respiratory failure secondary to COVID-19 pneumonia  - COVID-19 PCR positive  - Isolation precautions (contact, droplet, airborne)  - Chest X-ray - stable bilateral opacities  - Patient is saturating 92% on O2 NC 4L  - LE Duplex negative 3/23, CT Angio 3/29 negative for PE  - LE duplex performed - bilateral soleal vein thrombosis, no deep DVTs, repeat LE duplex revealed R gastrocnemius DVT   - s/p RDV, dexamethasone, course of Levaquin  - C/w Dexamethasone 6mg once daily   - C/w therapeutic Lovenox   - Titrate supplemental O2 to maintain SpO2 92-96%    #HTN  #Sinus tachycardia  - C/w Metoprolol 25 mg BID    #DMII  - HbA1c 6.9%  - FS well controlled  - C/w Lantus 20 U    DVT ppx: Lovenox 100 mg BID  GI ppx: PPI  Diet: CC  Activity: IAT  Full code  Dispo: Improving, possible downgrade   56 year old F with PMHx of HTN, admitted for acute hypoxemic respiratory failure due to COVID PNA.    #Acute hypoxemic respiratory failure secondary to COVID-19 pneumonia  - COVID-19 PCR positive  - Isolation precautions (contact, droplet, airborne)  - Chest X-ray - stable bilateral opacities  - Patient is saturating 92% on O2 NC 4L  - LE Duplex negative 3/23, CT Angio 3/29 negative for PE  - LE duplex performed - bilateral soleal vein thrombosis, no deep DVTs, repeat LE duplex revealed R gastrocnemius DVT   - s/p RDV, dexamethasone, course of Levaquin  - C/w Dexamethasone 6mg once daily   - C/w therapeutic Lovenox   - Titrate supplemental O2 to maintain SpO2 92-96%    #HTN  #Sinus tachycardia  - C/w Metoprolol 25 mg BID    #DMII  - HbA1c 6.9%  - FS well controlled  - C/w Lantus 20 U    DVT ppx: Lovenox 100 mg BID  GI ppx: PPI  Diet: CC  Activity: IAT  Full code  Dispo: Improving, possible downgrade    Called pt's , provided medical update and answered questions.

## 2021-04-09 NOTE — PROGRESS NOTE ADULT - ASSESSMENT
Patient is a 56-year-old, female patient, PMHx: Hypertension, presented to the ED for Shortness of breath and found to have COVID19 PNA    Acute Hypoxic Respiratory Failure secondary to COVID 19 PNA  Currently on 4L NC saturating 92%  s/p RDV therapy and s/p Levaquin x7d (03/12-03/19)  CTA 03/29: No PE  LE Duplex (04/01): b/l soleal vein thrombosis  BNP 04/02 60  On therapeutic Lovenox   continue with Decadron 6mg daily   Taper down oxygen requirements as tolerated  PT    HTN  Sinus tachycardia  Metoprolol tartrate 25 BID    DM2, new onset  Hba1c 6.9 (03/12)  Lantus 8units with ISS  Once downgraded, endocrine consult for outpt tx recommendations    Patient requires continuous monitoring in CEU

## 2021-04-09 NOTE — PROGRESS NOTE ADULT - SUBJECTIVE AND OBJECTIVE BOX
OVERNIGHT EVENTS: events noted, NC, afebrile    Vital Signs Last 24 Hrs  T(C): 36.6 (09 Apr 2021 07:40), Max: 36.8 (08 Apr 2021 16:00)  T(F): 97.9 (09 Apr 2021 07:40), Max: 98.2 (08 Apr 2021 16:00)  HR: 70 (09 Apr 2021 08:30) (70 - 120)  BP: 150/67 (09 Apr 2021 07:40) (128/63 - 156/69)  BP(mean): --  RR: 20 (09 Apr 2021 08:30) (20 - 28)  SpO2: 92% (09 Apr 2021 08:30) (77% - 98%)    PHYSICAL EXAMINATION:    GENERAL: The patient is awake and alert in no apparent distress.     HEENT: Head is normocephalic and atraumatic.    NECK: Supple.    LUNGS: bl crackles    HEART: Regular rate and rhythm without murmur.    ABDOMEN: Soft, nontender, and nondistended.      EXTREMITIES: Without any cyanosis, clubbing, rash, lesions or edema.    NEUROLOGIC: Grossly intact.    SKIN: No ulceration or induration present.      LABS:                        13.1   11.25 )-----------( 378      ( 09 Apr 2021 08:49 )             42.5     04-09    139  |  99  |  27<H>  ----------------------------<  186<H>  3.7   |  27  |  0.8    Ca    9.7      09 Apr 2021 08:49    TPro  7.2  /  Alb  3.9  /  TBili  0.3  /  DBili  x   /  AST  26  /  ALT  25  /  AlkPhos  76  04-09                    Procalcitonin, Serum: 0.03 ng/mL (04-07-21 @ 07:29)        04-08-21 @ 07:01  -  04-09-21 @ 07:00  --------------------------------------------------------  IN: 950 mL / OUT: 700 mL / NET: 250 mL    04-09-21 @ 07:01  -  04-09-21 @ 10:16  --------------------------------------------------------  IN: 0 mL / OUT: 1100 mL / NET: -1100 mL        MICROBIOLOGY:      MEDICATIONS  (STANDING):  chlorhexidine 4% Liquid 1 Application(s) Topical daily  dexAMETHasone  Injectable 6 milliGRAM(s) IV Push daily  dextrose 40% Gel 15 Gram(s) Oral once  dextrose 5%. 1000 milliLiter(s) (50 mL/Hr) IV Continuous <Continuous>  enoxaparin Injectable 100 milliGRAM(s) SubCutaneous two times a day  furosemide   Injectable 40 milliGRAM(s) IV Push daily  insulin glargine Injectable (LANTUS) 8 Unit(s) SubCutaneous at bedtime  insulin lispro (ADMELOG) corrective regimen sliding scale   SubCutaneous three times a day before meals  metoprolol tartrate 25 milliGRAM(s) Oral two times a day  multivitamin 1 Tablet(s) Oral daily  pantoprazole    Tablet 40 milliGRAM(s) Oral before breakfast    MEDICATIONS  (PRN):  acetaminophen   Tablet .. 650 milliGRAM(s) Oral every 6 hours PRN Temp greater or equal to 38C (100.4F), Mild Pain (1 - 3)  guaifenesin/dextromethorphan  Syrup 10 milliLiter(s) Oral every 6 hours PRN Cough      RADIOLOGY & ADDITIONAL STUDIES:

## 2021-04-09 NOTE — PROGRESS NOTE ADULT - SUBJECTIVE AND OBJECTIVE BOX
PAULA ELMORE  56y Female    CHIEF COMPLAINT:    Patient is a 56y old  Female who presents with a chief complaint of Shortness of breath (09 Apr 2021 10:16)      INTERVAL HPI/OVERNIGHT EVENTS:    Patient seen and examined. No acute events overnight. On NC today     ROS: All other systems are negative.    Vital Signs:    T(F): 97.7 (04-09-21 @ 12:09), Max: 98.2 (04-08-21 @ 16:00)  HR: 99 (04-09-21 @ 12:09) (70 - 99)  BP: 165/65 (04-09-21 @ 12:09) (128/63 - 165/65)  RR: 20 (04-09-21 @ 12:09) (20 - 20)  SpO2: 92% (04-09-21 @ 12:09) (89% - 98%)    08 Apr 2021 07:01  -  09 Apr 2021 07:00  --------------------------------------------------------  IN: 950 mL / OUT: 700 mL / NET: 250 mL    09 Apr 2021 07:01  -  09 Apr 2021 14:20  --------------------------------------------------------  IN: 0 mL / OUT: 1100 mL / NET: -1100 mL    POCT Blood Glucose.: 150 mg/dL (09 Apr 2021 11:52)  POCT Blood Glucose.: 95 mg/dL (09 Apr 2021 07:39)  POCT Blood Glucose.: 130 mg/dL (08 Apr 2021 20:56)  POCT Blood Glucose.: 145 mg/dL (08 Apr 2021 16:19)    PHYSICAL EXAM:    GENERAL:  NAD  SKIN: No rashes or lesions  HEENT: Atraumatic. Normocephalic.   NECK: Supple, No JVD.   PULMONARY: CTA B/L. No wheezing.   CVS: Normal S1, S2. Rate and Rhythm are regular.   ABDOMEN/GI: Soft, Nontender, Nondistended  MSK: no clubbing or cyanosis  NEUROLOGIC: moves all extremities  PSYCH: Alert & oriented x 3, normal affect    Consultant(s) Notes Reviewed:  [x ] YES  [ ] NO  Care Discussed with Consultants/Other Providers [ x] YES  [ ] NO    LABS:                        13.1   11.25 )-----------( 378      ( 09 Apr 2021 08:49 )             42.5     139  |  99  |  27<H>  ----------------------------<  186<H>  3.7   |  27  |  0.8    Ca    9.7      09 Apr 2021 08:49    TPro  7.2  /  Alb  3.9  /  TBili  0.3  /  DBili  x   /  AST  26  /  ALT  25  /  AlkPhos  76  04-09    RADIOLOGY & ADDITIONAL TESTS:  Imaging or report Personally Reviewed:  [x] YES  [ ] NO  EKG reviewed: [x] YES  [ ] NO    Medications:  Standing  chlorhexidine 4% Liquid 1 Application(s) Topical daily  dexAMETHasone  Injectable 6 milliGRAM(s) IV Push daily  dextrose 40% Gel 15 Gram(s) Oral once  dextrose 5%. 1000 milliLiter(s) IV Continuous <Continuous>  enoxaparin Injectable 100 milliGRAM(s) SubCutaneous two times a day  furosemide   Injectable 40 milliGRAM(s) IV Push daily  insulin glargine Injectable (LANTUS) 8 Unit(s) SubCutaneous at bedtime  insulin lispro (ADMELOG) corrective regimen sliding scale   SubCutaneous three times a day before meals  metoprolol tartrate 25 milliGRAM(s) Oral two times a day  multivitamin 1 Tablet(s) Oral daily  pantoprazole    Tablet 40 milliGRAM(s) Oral before breakfast    PRN Meds  acetaminophen   Tablet .. 650 milliGRAM(s) Oral every 6 hours PRN  guaifenesin/dextromethorphan  Syrup 10 milliLiter(s) Oral every 6 hours PRN

## 2021-04-09 NOTE — PROGRESS NOTE ADULT - ASSESSMENT
IMPRESSION:    Acute hypoxemic resp failure slowly improving  Severe covid pneumonia SP TOCI and PLasma   Probable bacterial superinfection treated   Calf DVT/ high D dimer      PLAN:    CNS: Avoid CNS depressant    HEENT:  Oral care    PULMONARY:  HOB @ 45 degrees.  Wean O2 as tolerated. keep dexa , FIO2 to keep SAO2 88 to 94%    CARDIOVASCULAR: keep equal to negative balance.      GI: GI prophylaxis                                          Feeding po    RENAL:  F/u  lytes.  Correct as needed. accurate I/O    INFECTIOUS DISEASE: trend inf markers    HEMATOLOGICAL:  DVT Therapeutic lovenox    ENDOCRINE:  Follow up FS.  Insulin protocol if needed.    CODE STATUS: FULL CODE    DISPOSITION: SDU    Prognosis guarded

## 2021-04-09 NOTE — PROGRESS NOTE ADULT - SUBJECTIVE AND OBJECTIVE BOX
Patient Information:  PAULA ELMORE / 56y / Female / MRN#:[id number]    Hospital Day: 30d    Interval History:  Patient seen and examined at bedside. No acute events overnight.    Past Medical History:    Past Surgical History:    Allergies:  Allergy Status Unknown    Medications:  PRN:  acetaminophen   Tablet .. 650 milliGRAM(s) Oral every 6 hours PRN Temp greater or equal to 38C (100.4F), Mild Pain (1 - 3)  guaifenesin/dextromethorphan  Syrup 10 milliLiter(s) Oral every 6 hours PRN Cough    Standing:  chlorhexidine 4% Liquid 1 Application(s) Topical daily  dexAMETHasone  Injectable 6 milliGRAM(s) IV Push daily  dextrose 40% Gel 15 Gram(s) Oral once  dextrose 5%. 1000 milliLiter(s) (50 mL/Hr) IV Continuous <Continuous>  enoxaparin Injectable 100 milliGRAM(s) SubCutaneous two times a day  furosemide   Injectable 40 milliGRAM(s) IV Push daily  insulin glargine Injectable (LANTUS) 8 Unit(s) SubCutaneous at bedtime  insulin lispro (ADMELOG) corrective regimen sliding scale   SubCutaneous three times a day before meals  metoprolol tartrate 25 milliGRAM(s) Oral two times a day  multivitamin 1 Tablet(s) Oral daily  pantoprazole    Tablet 40 milliGRAM(s) Oral before breakfast    Home:    Vitals:  T(C): 36.6, Max: 36.8 (04-08-21 @ 16:00)  T(F): 97.9, Max: 98.2 (04-08-21 @ 16:00)  HR: 70 (70 - 120)  BP: 150/67 (128/63 - 156/69)  RR: 20 (20 - 28)  SpO2: 92% (77% - 98%)    Physical Exam:  General: Awake, alert, NAD, sitting up in bed on O2 NC 4L  HEENT: Head NC/AT  Heart: Regular rhythm, rate; loud systolic murmur best heard at LUSB  Lungs: Good air entry  Abdomen: Soft, nontender, nondistended, BS+  Extremities: No edema, clubbing, cyanosis in upper or lower extremities    Labs:  CBC (04-09 @ 08:49)                        Hgb: 13.1   WBC: 11.25 )-----------------( Plts: 378                              Hct: 42.5     Chem (04-08 @ 08:04)  Na: 141  |     Cl: 100     |  BUN: 27  -----------------------------------------< Gluc: 126    K: 3.4   |    HCO3: 27  |  Cr: 0.8    Ca 9.7 (04-08 @ 08:04)    LFTs (04-08 @ 08:04)  TPro 7.3  /  Alb 4.0  TBili 0.2  /  DBili     AST 23  /  ALT 19  /  AlkPhos 79

## 2021-04-10 LAB
ALBUMIN SERPL ELPH-MCNC: 4 G/DL — SIGNIFICANT CHANGE UP (ref 3.5–5.2)
ALP SERPL-CCNC: 75 U/L — SIGNIFICANT CHANGE UP (ref 30–115)
ALT FLD-CCNC: 31 U/L — SIGNIFICANT CHANGE UP (ref 0–41)
ANION GAP SERPL CALC-SCNC: 21 MMOL/L — HIGH (ref 7–14)
AST SERPL-CCNC: 27 U/L — SIGNIFICANT CHANGE UP (ref 0–41)
BASOPHILS # BLD AUTO: 0.05 K/UL — SIGNIFICANT CHANGE UP (ref 0–0.2)
BASOPHILS NFR BLD AUTO: 0.5 % — SIGNIFICANT CHANGE UP (ref 0–1)
BILIRUB SERPL-MCNC: 0.3 MG/DL — SIGNIFICANT CHANGE UP (ref 0.2–1.2)
BUN SERPL-MCNC: 24 MG/DL — HIGH (ref 10–20)
CALCIUM SERPL-MCNC: 9.6 MG/DL — SIGNIFICANT CHANGE UP (ref 8.5–10.1)
CHLORIDE SERPL-SCNC: 98 MMOL/L — SIGNIFICANT CHANGE UP (ref 98–110)
CO2 SERPL-SCNC: 22 MMOL/L — SIGNIFICANT CHANGE UP (ref 17–32)
CREAT SERPL-MCNC: 0.9 MG/DL — SIGNIFICANT CHANGE UP (ref 0.7–1.5)
EOSINOPHIL # BLD AUTO: 0.38 K/UL — SIGNIFICANT CHANGE UP (ref 0–0.7)
EOSINOPHIL NFR BLD AUTO: 3.5 % — SIGNIFICANT CHANGE UP (ref 0–8)
GLUCOSE BLDC GLUCOMTR-MCNC: 103 MG/DL — HIGH (ref 70–99)
GLUCOSE BLDC GLUCOMTR-MCNC: 161 MG/DL — HIGH (ref 70–99)
GLUCOSE BLDC GLUCOMTR-MCNC: 181 MG/DL — HIGH (ref 70–99)
GLUCOSE BLDC GLUCOMTR-MCNC: 191 MG/DL — HIGH (ref 70–99)
GLUCOSE SERPL-MCNC: 188 MG/DL — HIGH (ref 70–99)
HCT VFR BLD CALC: 42.8 % — SIGNIFICANT CHANGE UP (ref 37–47)
HGB BLD-MCNC: 13.2 G/DL — SIGNIFICANT CHANGE UP (ref 12–16)
IMM GRANULOCYTES NFR BLD AUTO: 2 % — HIGH (ref 0.1–0.3)
LYMPHOCYTES # BLD AUTO: 1.18 K/UL — LOW (ref 1.2–3.4)
LYMPHOCYTES # BLD AUTO: 10.8 % — LOW (ref 20.5–51.1)
MCHC RBC-ENTMCNC: 28.1 PG — SIGNIFICANT CHANGE UP (ref 27–31)
MCHC RBC-ENTMCNC: 30.8 G/DL — LOW (ref 32–37)
MCV RBC AUTO: 91.1 FL — SIGNIFICANT CHANGE UP (ref 81–99)
MONOCYTES # BLD AUTO: 0.33 K/UL — SIGNIFICANT CHANGE UP (ref 0.1–0.6)
MONOCYTES NFR BLD AUTO: 3 % — SIGNIFICANT CHANGE UP (ref 1.7–9.3)
NEUTROPHILS # BLD AUTO: 8.8 K/UL — HIGH (ref 1.4–6.5)
NEUTROPHILS NFR BLD AUTO: 80.2 % — HIGH (ref 42.2–75.2)
NRBC # BLD: 0 /100 WBCS — SIGNIFICANT CHANGE UP (ref 0–0)
PLATELET # BLD AUTO: 388 K/UL — SIGNIFICANT CHANGE UP (ref 130–400)
POTASSIUM SERPL-MCNC: 4 MMOL/L — SIGNIFICANT CHANGE UP (ref 3.5–5)
POTASSIUM SERPL-SCNC: 4 MMOL/L — SIGNIFICANT CHANGE UP (ref 3.5–5)
PROT SERPL-MCNC: 7 G/DL — SIGNIFICANT CHANGE UP (ref 6–8)
RBC # BLD: 4.7 M/UL — SIGNIFICANT CHANGE UP (ref 4.2–5.4)
RBC # FLD: 12.6 % — SIGNIFICANT CHANGE UP (ref 11.5–14.5)
SODIUM SERPL-SCNC: 141 MMOL/L — SIGNIFICANT CHANGE UP (ref 135–146)
WBC # BLD: 10.96 K/UL — HIGH (ref 4.8–10.8)
WBC # FLD AUTO: 10.96 K/UL — HIGH (ref 4.8–10.8)

## 2021-04-10 PROCEDURE — 99232 SBSQ HOSP IP/OBS MODERATE 35: CPT

## 2021-04-10 RX ADMIN — Medication 40 MILLIGRAM(S): at 06:55

## 2021-04-10 RX ADMIN — INSULIN GLARGINE 8 UNIT(S): 100 INJECTION, SOLUTION SUBCUTANEOUS at 22:11

## 2021-04-10 RX ADMIN — Medication 1: at 11:53

## 2021-04-10 RX ADMIN — ENOXAPARIN SODIUM 100 MILLIGRAM(S): 100 INJECTION SUBCUTANEOUS at 17:02

## 2021-04-10 RX ADMIN — Medication 6 MILLIGRAM(S): at 06:56

## 2021-04-10 RX ADMIN — PANTOPRAZOLE SODIUM 40 MILLIGRAM(S): 20 TABLET, DELAYED RELEASE ORAL at 06:55

## 2021-04-10 RX ADMIN — Medication 30 MILLIGRAM(S): at 06:55

## 2021-04-10 RX ADMIN — Medication 25 MILLIGRAM(S): at 06:55

## 2021-04-10 RX ADMIN — Medication 1 TABLET(S): at 11:50

## 2021-04-10 RX ADMIN — Medication 1: at 17:01

## 2021-04-10 RX ADMIN — ENOXAPARIN SODIUM 100 MILLIGRAM(S): 100 INJECTION SUBCUTANEOUS at 06:56

## 2021-04-10 RX ADMIN — Medication 25 MILLIGRAM(S): at 17:01

## 2021-04-10 NOTE — PROGRESS NOTE ADULT - SUBJECTIVE AND OBJECTIVE BOX
PAULA ELMORE  56y Female    CHIEF COMPLAINT:    Patient is a 56y old  Female who presents with a chief complaint of Shortness of breath (10 Apr 2021 08:06)      INTERVAL HPI/OVERNIGHT EVENTS:    Patient seen and examined. No acute events overnight. On 4l NC     ROS: All other systems are negative.    Vital Signs:    T(F): 98.6 (04-10-21 @ 11:41), Max: 98.6 (04-10-21 @ 11:41)  HR: 73 (04-10-21 @ 11:41) (73 - 90)  BP: 141/61 (04-10-21 @ 11:41) (129/59 - 141/61)  RR: 20 (04-10-21 @ 11:41) (20 - 20)  SpO2: 100% (04-10-21 @ 11:41) (93% - 100%)    09 Apr 2021 07:01  -  10 Apr 2021 07:00  --------------------------------------------------------  IN: 0 mL / OUT: 1100 mL / NET: -1100 mL    POCT Blood Glucose.: 181 mg/dL (10 Apr 2021 11:47)  POCT Blood Glucose.: 103 mg/dL (10 Apr 2021 07:44)  POCT Blood Glucose.: 141 mg/dL (09 Apr 2021 21:32)  POCT Blood Glucose.: 141 mg/dL (09 Apr 2021 17:16)    PHYSICAL EXAM:    GENERAL:  NAD  SKIN: No rashes or lesions  HEENT: Atraumatic. Normocephalic  NECK: Supple, No JVD.   PULMONARY: poor inspiratory effort. No wheezing. No rales  CVS: Normal S1, S2. Rate and Rhythm are regular.    ABDOMEN/GI: Soft, Nontender, Nondistended; BS present  MSK:  No edema B/L LE. No clubbing or cyanosis  NEUROLOGIc: Moves all extremities  PSYCH: Alert & oriented x 3, normal affect    Consultant(s) Notes Reviewed:  [x ] YES  [ ] NO  Care Discussed with Consultants/Other Providers [ x] YES  [ ] NO    LABS:                        13.2   10.96 )-----------( 388      ( 10 Apr 2021 04:30 )             42.8     141  |  98  |  24<H>  ----------------------------<  188<H>  4.0   |  22  |  0.9    Ca    9.6      10 Apr 2021 04:30    TPro  7.0  /  Alb  4.0  /  TBili  0.3  /  DBili  x   /  AST  27  /  ALT  31  /  AlkPhos  75  04-10    RADIOLOGY & ADDITIONAL TESTS:  Imaging or report Personally Reviewed:  [x] YES  [ ] NO  EKG reviewed: [x] YES  [ ] NO    Medications:  Standing  chlorhexidine 4% Liquid 1 Application(s) Topical daily  dexAMETHasone  Injectable 6 milliGRAM(s) IV Push daily  dextrose 40% Gel 15 Gram(s) Oral once  dextrose 5%. 1000 milliLiter(s) IV Continuous <Continuous>  enoxaparin Injectable 100 milliGRAM(s) SubCutaneous two times a day  insulin glargine Injectable (LANTUS) 8 Unit(s) SubCutaneous at bedtime  insulin lispro (ADMELOG) corrective regimen sliding scale   SubCutaneous three times a day before meals  metoprolol tartrate 25 milliGRAM(s) Oral two times a day  multivitamin 1 Tablet(s) Oral daily  NIFEdipine XL 30 milliGRAM(s) Oral daily  pantoprazole    Tablet 40 milliGRAM(s) Oral before breakfast    PRN Meds  acetaminophen   Tablet .. 650 milliGRAM(s) Oral every 6 hours PRN  guaifenesin/dextromethorphan  Syrup 10 milliLiter(s) Oral every 6 hours PRN

## 2021-04-10 NOTE — PROGRESS NOTE ADULT - SUBJECTIVE AND OBJECTIVE BOX
Patient Information:  PAULA SUMO / 56y / Female / MRN#:[id number]    Hospital Day: 31d    Interval History:  Patient seen and examined at bedside. Pt was on O2 NC 4L, had difficulty breathing overnight and NRB placed over NC. Is saturating 98%, appears comfortable, denies any current difficulty breathing.    Past Medical History:    Past Surgical History:    Allergies:  Allergy Status Unknown    Medications:  PRN:  acetaminophen   Tablet .. 650 milliGRAM(s) Oral every 6 hours PRN Temp greater or equal to 38C (100.4F), Mild Pain (1 - 3)  guaifenesin/dextromethorphan  Syrup 10 milliLiter(s) Oral every 6 hours PRN Cough    Standing:  chlorhexidine 4% Liquid 1 Application(s) Topical daily  dexAMETHasone  Injectable 6 milliGRAM(s) IV Push daily  dextrose 40% Gel 15 Gram(s) Oral once  dextrose 5%. 1000 milliLiter(s) (50 mL/Hr) IV Continuous <Continuous>  enoxaparin Injectable 100 milliGRAM(s) SubCutaneous two times a day  furosemide   Injectable 40 milliGRAM(s) IV Push daily  insulin glargine Injectable (LANTUS) 8 Unit(s) SubCutaneous at bedtime  insulin lispro (ADMELOG) corrective regimen sliding scale   SubCutaneous three times a day before meals  metoprolol tartrate 25 milliGRAM(s) Oral two times a day  multivitamin 1 Tablet(s) Oral daily  NIFEdipine XL 30 milliGRAM(s) Oral daily  pantoprazole    Tablet 40 milliGRAM(s) Oral before breakfast    Home:    Vitals:  T(C): 36.7, Max: 36.9 (04-09-21 @ 21:25)  T(F): 98.1, Max: 98.4 (04-09-21 @ 21:25)  HR: 80 (70 - 99)  BP: 135/62 (134/59 - 165/65)  RR: 20 (20 - 20)  SpO2: 100% (92% - 100%)    Physical Exam:  General: Awake, alert, NAD, sitting up in bed on O2 NC 4L + NRB  HEENT: Head NC/AT  Heart: Regular rhythm, rate; loud systolic murmur best heard at LUSB  Lungs: Good air entry, no labored respirations  Abdomen: Soft, nontender, nondistended, BS+  Extremities: No edema, clubbing, cyanosis in upper or lower extremities    Labs:  CBC (04-09 @ 08:49)                        Hgb: 13.1   WBC: 11.25 )-----------------( Plts: 378                              Hct: 42.5     Chem (04-09 @ 08:49)  Na: 139  |     Cl: 99     |  BUN: 27  -----------------------------------------< Gluc: 186    K: 3.7   |    HCO3: 27  |  Cr: 0.8    Ca 9.7 (04-09 @ 08:49)    LFTs (04-09 @ 08:49)  TPro 7.2  /  Alb 3.9  TBili 0.3  /  DBili     AST 26  /  ALT 25  /  AlkPhos 76

## 2021-04-10 NOTE — PROGRESS NOTE ADULT - ASSESSMENT
Patient is a 56-year-old, female patient, PMHx: Hypertension, presented to the ED for Shortness of breath and found to have COVID19 PNA    Acute Hypoxic Respiratory Failure secondary to COVID 19 PNA  Currently on 4L NC saturating 98%  s/p RDV therapy and s/p Levaquin x7d (03/12-03/19)  CTA 03/29: No PE  LE Duplex (04/01): b/l soleal vein thrombosis  BNP 04/02 61  On therapeutic Lovenox   Taper decadron to 4mg today  Taper down oxygen requirements as tolerated  aggressive PT    HTN  Sinus tachycardia  Metoprolol tartrate 25 BID    DM2, new onset  Hba1c 6.9 (03/12)  Lantus 8units with ISS  Once downgraded, endocrine consult for outpt tx recommendations    #Progress Note Handoff  Pending (specify):   improvement in respiratory status/PT  Family discussion: Plan of care discussed with patient, aware and agreeable   Disposition:  possible dg to floor versus east, needs aggressive PT    Danii López MD  s. [phone]

## 2021-04-10 NOTE — PROGRESS NOTE ADULT - ASSESSMENT
56 year old F with PMHx of HTN, admitted for acute hypoxemic respiratory failure due to COVID PNA.    #Acute hypoxemic respiratory failure secondary to COVID-19 pneumonia  - COVID-19 PCR positive  - Isolation precautions (contact, droplet, airborne)  - Chest X-ray - stable bilateral opacities  - Patient is saturating 98% on O2 NC 4L in addition to NRB  - LE Duplex negative 3/23, CT Angio 3/29 negative for PE  - LE duplex performed - bilateral soleal vein thrombosis, no deep DVTs, repeat LE duplex revealed R gastrocnemius DVT   - s/p RDV, dexamethasone, course of Levaquin  - C/w Dexamethasone 6mg once daily while in hospital  - C/w therapeutic Lovenox   - Titrate supplemental O2 to maintain SpO2 92-96%    #HTN  #Sinus tachycardia  - C/w Metoprolol 25 mg BID    #DMII  - HbA1c 6.9%  - FS well controlled  - C/w Lantus 20 U    DVT ppx: Lovenox 100 mg BID  GI ppx: PPI  Diet: CC  Activity: IAT  Full code  Dispo: C/w CEU monitoring

## 2021-04-11 LAB
ALBUMIN SERPL ELPH-MCNC: 3.8 G/DL — SIGNIFICANT CHANGE UP (ref 3.5–5.2)
ALP SERPL-CCNC: 73 U/L — SIGNIFICANT CHANGE UP (ref 30–115)
ALT FLD-CCNC: 35 U/L — SIGNIFICANT CHANGE UP (ref 0–41)
ANION GAP SERPL CALC-SCNC: 15 MMOL/L — HIGH (ref 7–14)
AST SERPL-CCNC: 25 U/L — SIGNIFICANT CHANGE UP (ref 0–41)
BASOPHILS # BLD AUTO: 0 K/UL — SIGNIFICANT CHANGE UP (ref 0–0.2)
BASOPHILS NFR BLD AUTO: 0 % — SIGNIFICANT CHANGE UP (ref 0–1)
BILIRUB SERPL-MCNC: 0.3 MG/DL — SIGNIFICANT CHANGE UP (ref 0.2–1.2)
BUN SERPL-MCNC: 24 MG/DL — HIGH (ref 10–20)
CALCIUM SERPL-MCNC: 9.3 MG/DL — SIGNIFICANT CHANGE UP (ref 8.5–10.1)
CHLORIDE SERPL-SCNC: 99 MMOL/L — SIGNIFICANT CHANGE UP (ref 98–110)
CO2 SERPL-SCNC: 26 MMOL/L — SIGNIFICANT CHANGE UP (ref 17–32)
CREAT SERPL-MCNC: 0.7 MG/DL — SIGNIFICANT CHANGE UP (ref 0.7–1.5)
EOSINOPHIL # BLD AUTO: 0.52 K/UL — SIGNIFICANT CHANGE UP (ref 0–0.7)
EOSINOPHIL NFR BLD AUTO: 4.3 % — SIGNIFICANT CHANGE UP (ref 0–8)
GLUCOSE BLDC GLUCOMTR-MCNC: 145 MG/DL — HIGH (ref 70–99)
GLUCOSE BLDC GLUCOMTR-MCNC: 150 MG/DL — HIGH (ref 70–99)
GLUCOSE BLDC GLUCOMTR-MCNC: 165 MG/DL — HIGH (ref 70–99)
GLUCOSE BLDC GLUCOMTR-MCNC: 272 MG/DL — HIGH (ref 70–99)
GLUCOSE SERPL-MCNC: 152 MG/DL — HIGH (ref 70–99)
HCT VFR BLD CALC: 42.6 % — SIGNIFICANT CHANGE UP (ref 37–47)
HGB BLD-MCNC: 13 G/DL — SIGNIFICANT CHANGE UP (ref 12–16)
LYMPHOCYTES # BLD AUTO: 1.14 K/UL — LOW (ref 1.2–3.4)
LYMPHOCYTES # BLD AUTO: 9.4 % — LOW (ref 20.5–51.1)
MCHC RBC-ENTMCNC: 28.3 PG — SIGNIFICANT CHANGE UP (ref 27–31)
MCHC RBC-ENTMCNC: 30.5 G/DL — LOW (ref 32–37)
MCV RBC AUTO: 92.6 FL — SIGNIFICANT CHANGE UP (ref 81–99)
MONOCYTES # BLD AUTO: 0.1 K/UL — SIGNIFICANT CHANGE UP (ref 0.1–0.6)
MONOCYTES NFR BLD AUTO: 0.8 % — LOW (ref 1.7–9.3)
NEUTROPHILS # BLD AUTO: 10 K/UL — HIGH (ref 1.4–6.5)
NEUTROPHILS NFR BLD AUTO: 82 % — HIGH (ref 42.2–75.2)
PLATELET # BLD AUTO: 359 K/UL — SIGNIFICANT CHANGE UP (ref 130–400)
POTASSIUM SERPL-MCNC: 3.8 MMOL/L — SIGNIFICANT CHANGE UP (ref 3.5–5)
POTASSIUM SERPL-SCNC: 3.8 MMOL/L — SIGNIFICANT CHANGE UP (ref 3.5–5)
PROT SERPL-MCNC: 6.7 G/DL — SIGNIFICANT CHANGE UP (ref 6–8)
RBC # BLD: 4.6 M/UL — SIGNIFICANT CHANGE UP (ref 4.2–5.4)
RBC # FLD: 12.9 % — SIGNIFICANT CHANGE UP (ref 11.5–14.5)
SODIUM SERPL-SCNC: 140 MMOL/L — SIGNIFICANT CHANGE UP (ref 135–146)
WBC # BLD: 12.08 K/UL — HIGH (ref 4.8–10.8)
WBC # FLD AUTO: 12.08 K/UL — HIGH (ref 4.8–10.8)

## 2021-04-11 PROCEDURE — 99232 SBSQ HOSP IP/OBS MODERATE 35: CPT

## 2021-04-11 RX ORDER — DEXAMETHASONE 0.5 MG/5ML
4 ELIXIR ORAL DAILY
Refills: 0 | Status: DISCONTINUED | OUTPATIENT
Start: 2021-04-11 | End: 2021-04-14

## 2021-04-11 RX ADMIN — Medication 3: at 11:59

## 2021-04-11 RX ADMIN — Medication 6 MILLIGRAM(S): at 05:58

## 2021-04-11 RX ADMIN — Medication 25 MILLIGRAM(S): at 05:59

## 2021-04-11 RX ADMIN — Medication 1: at 16:29

## 2021-04-11 RX ADMIN — Medication 25 MILLIGRAM(S): at 17:40

## 2021-04-11 RX ADMIN — ENOXAPARIN SODIUM 100 MILLIGRAM(S): 100 INJECTION SUBCUTANEOUS at 17:41

## 2021-04-11 RX ADMIN — CHLORHEXIDINE GLUCONATE 1 APPLICATION(S): 213 SOLUTION TOPICAL at 05:57

## 2021-04-11 RX ADMIN — ENOXAPARIN SODIUM 100 MILLIGRAM(S): 100 INJECTION SUBCUTANEOUS at 05:59

## 2021-04-11 RX ADMIN — INSULIN GLARGINE 8 UNIT(S): 100 INJECTION, SOLUTION SUBCUTANEOUS at 22:38

## 2021-04-11 RX ADMIN — Medication 30 MILLIGRAM(S): at 05:59

## 2021-04-11 RX ADMIN — Medication 1 TABLET(S): at 11:59

## 2021-04-11 RX ADMIN — PANTOPRAZOLE SODIUM 40 MILLIGRAM(S): 20 TABLET, DELAYED RELEASE ORAL at 08:10

## 2021-04-11 NOTE — CHART NOTE - NSCHARTNOTEFT_GEN_A_CORE
Per PT, patient desated to 70's on exertion and became tachycardic. Attending aware of this ongoing issue. Patient was put back at rest and currently has NC 5L and NRB on. Per attending, patient can stay one more day in ceu and will be downgraded tomorrow. Transfer ordered cancelled.

## 2021-04-11 NOTE — CHART NOTE - NSCHARTNOTEFT_GEN_A_CORE
CCU Transfer Note    Transfer from: Ceu    Transfer to: (X  ) Medicine    (  ) Telemetry    (  ) RCU                               (  ) Palliative    (  ) Stroke Unit    (  ) MICU    (  ) __________________    Accepting Physician:    Signout given to:     HPI / Ceu COURSE:    56-year-old, female patient, PMHx: Hypertension, presented to the ED for Shortness of breath. Histoy goes back to 2 days prior to presentation when the patient started having dyspnea. New-onset, aggravated by exertion, no alleviating factors. No orthopnea. The patient had an associated cough, productive of whitish sputum. She denies any fever ( but was febrile in the ED). No sore throat, no anosmia no loss of taste. No skin contact, no recent travel.     SDU: patient is currently on NC 4L sating well and hospital course was complicated by superficial DVTs. Patient is currently on lovenox therapeutic. Patient is otherwise stable to be downgraded    Vital Signs Last 24 Hrs  T(C): 36.7 (11 Apr 2021 12:39), Max: 36.7 (10 Apr 2021 16:30)  T(F): 98.1 (11 Apr 2021 12:39), Max: 98.1 (10 Apr 2021 16:30)  HR: 99 (11 Apr 2021 12:39) (67 - 99)  BP: 121/57 (11 Apr 2021 12:39) (112/87 - 133/59)  BP(mean): 82 (11 Apr 2021 12:39) (82 - 94)  RR: 18 (11 Apr 2021 12:39) (18 - 20)  SpO2: 95% (11 Apr 2021 07:45) (95% - 100%)    I&O's Summary    10 Apr 2021 07:01  -  11 Apr 2021 07:00  --------------------------------------------------------  IN: 0 mL / OUT: 650 mL / NET: -650 mL        LABS:                               13.0   12.08 )-----------( 359      ( 11 Apr 2021 08:09 )             42.6       04-11    140  |  99  |  24<H>  ----------------------------<  152<H>  3.8   |  26  |  0.7    Ca    9.3      11 Apr 2021 08:09    TPro  6.7  /  Alb  3.8  /  TBili  0.3  /  DBili  x   /  AST  25  /  ALT  35  /  AlkPhos  73  04-11        ASSESSMENT & PLAN:     56 year old F with PMHx of HTN, admitted for acute hypoxemic respiratory failure due to COVID PNA.    #Acute hypoxemic respiratory failure secondary to COVID-19 pneumonia  - COVID-19 PCR positive  - Isolation precautions (contact, droplet, airborne)  - Chest X-ray - stable bilateral opacities  - Patient is saturating 98% on O2 NC 4L in addition to NRB  - LE Duplex negative 3/23, CT Angio 3/29 negative for PE  - LE duplex performed - bilateral soleal vein thrombosis, no deep DVTs, repeat LE duplex revealed R gastrocnemius DVT   - s/p RDV, dexamethasone, course of Levaquin  - C/w Dexamethasone 6mg once daily while in hospital  - C/w therapeutic Lovenox   - Titrate supplemental O2 to maintain SpO2 92-96%    #HTN  #Sinus tachycardia  - C/w Metoprolol 25 mg BID    #DMII  - HbA1c 6.9%  - FS well controlled  - C/w Lantus 20 U    DVT ppx: Lovenox 100 mg BID  GI ppx: PPI  Diet: CC  Activity: IAT  Full code  Dispo: downgrade to floor

## 2021-04-11 NOTE — PROGRESS NOTE ADULT - SUBJECTIVE AND OBJECTIVE BOX
PAULA ELMORE  56y Female    CHIEF COMPLAINT:    Patient is a 56y old  Female who presents with a chief complaint of Shortness of breath (10 Apr 2021 14:59)      INTERVAL HPI/OVERNIGHT EVENTS:    Patient seen and examined. No acute events overnight. On Nasal cannula alternating with NRB    ROS: All other systems are negative.    Vital Signs:    T(F): 97.7 (04-11-21 @ 08:13), Max: 98.6 (04-10-21 @ 11:41)  HR: 92 (04-11-21 @ 08:13) (67 - 92)  BP: 112/87 (04-11-21 @ 08:13) (112/87 - 141/61)  RR: 20 (04-11-21 @ 08:13) (20 - 20)  SpO2: 95% (04-11-21 @ 07:45) (95% - 100%)    10 Apr 2021 07:01  -  11 Apr 2021 07:00  --------------------------------------------------------  IN: 0 mL / OUT: 650 mL / NET: -650 mL    POCT Blood Glucose.: 145 mg/dL (11 Apr 2021 07:57)  POCT Blood Glucose.: 161 mg/dL (10 Apr 2021 21:51)  POCT Blood Glucose.: 191 mg/dL (10 Apr 2021 16:48)  POCT Blood Glucose.: 181 mg/dL (10 Apr 2021 11:47)    PHYSICAL EXAM:    GENERAL:  NAD  SKIN: No rashes or lesions  HEENT: Atraumatic. Normocephalic.   NECK: Supple, No JVD.   PULMONARY: coarse breath sounds. No wheezing. No rales  CVS: Normal S1, S2. Rate and Rhythm are regular.   ABDOMEN/GI: Soft, Nontender, Nondistended; BS present  MSK: no clubbing or cyanosis  NEUROLOGIC: moves all extremities  PSYCH: Alert & oriented x 3, normal affect    Consultant(s) Notes Reviewed:  [x ] YES  [ ] NO  Care Discussed with Consultants/Other Providers [ x] YES  [ ] NO    LABS:                        13.0   12.08 )-----------( 359      ( 11 Apr 2021 08:09 )             42.6     140  |  99  |  24<H>  ----------------------------<  152<H>  3.8   |  26  |  0.7    Ca    9.3      11 Apr 2021 08:09    TPro  6.7  /  Alb  3.8  /  TBili  0.3  /  DBili  x   /  AST  25  /  ALT  35  /  AlkPhos  73  04-11    RADIOLOGY & ADDITIONAL TESTS:  Imaging or report Personally Reviewed:  [x] YES  [ ] NO  EKG reviewed: [x] YES  [ ] NO    Medications:  Standing  chlorhexidine 4% Liquid 1 Application(s) Topical daily  dexAMETHasone  Injectable 6 milliGRAM(s) IV Push daily  dextrose 40% Gel 15 Gram(s) Oral once  dextrose 5%. 1000 milliLiter(s) IV Continuous <Continuous>  enoxaparin Injectable 100 milliGRAM(s) SubCutaneous two times a day  insulin glargine Injectable (LANTUS) 8 Unit(s) SubCutaneous at bedtime  insulin lispro (ADMELOG) corrective regimen sliding scale   SubCutaneous three times a day before meals  metoprolol tartrate 25 milliGRAM(s) Oral two times a day  multivitamin 1 Tablet(s) Oral daily  NIFEdipine XL 30 milliGRAM(s) Oral daily  pantoprazole    Tablet 40 milliGRAM(s) Oral before breakfast    PRN Meds  acetaminophen   Tablet .. 650 milliGRAM(s) Oral every 6 hours PRN  guaifenesin/dextromethorphan  Syrup 10 milliLiter(s) Oral every 6 hours PRN

## 2021-04-11 NOTE — PROGRESS NOTE ADULT - ASSESSMENT
Patient is a 56-year-old, female patient, PMHx: Hypertension, presented to the ED for Shortness of breath and found to have COVID19 PNA    Acute Hypoxic Respiratory Failure secondary to COVID 19 PNA  Inadequate Oral intake  Currently on 4L NC saturating 98%  s/p RDV therapy and s/p Levaquin x7d (03/12-03/19)  CTA 03/29: No PE  LE Duplex (04/01): b/l soleal vein thrombosis  BNP 04/02 61  On therapeutic Lovenox   Taper decadron to 4mg today  Taper down oxygen requirements as tolerated  aggressive PT    HTN  Sinus tachycardia  Metoprolol tartrate 25 BID    DM2, new onset  Hba1c 6.9 (03/12)  Lantus 8units with ISS  Once downgraded, endocrine consult for outpt tx recommendations    #Progress Note Handoff  Pending (specify):   improvement in respiratory status/PT  Family discussion: Plan of care discussed with patient, aware and agreeable   Disposition:  possible dg to floor versus east, needs aggressive PT    Danii López MD  s. [phone] Patient is a 56-year-old, female patient, PMHx: Hypertension, presented to the ED for Shortness of breath and found to have COVID19 PNA    Acute Hypoxic Respiratory Failure secondary to COVID 19 PNA  Inadequate Oral intake  Currently on 4L NC saturating 98%  Requests NRB at times  s/p RDV therapy and s/p Levaquin x7d (03/12-03/19)  CTA 03/29: No PE  LE Duplex (04/01): b/l soleal vein thrombosis  BNP 04/02 61  On therapeutic Lovenox   Taper decadron to 4mg today  Taper down oxygen requirements as tolerated  aggressive PT    HTN  Sinus tachycardia  Metoprolol tartrate 25 BID    DM2, new onset  Hba1c 6.9 (03/12)  Lantus 8units with ISS  Once downgraded, endocrine consult for outpt tx recommendations    #Progress Note Handoff  Pending (specify):   improvement in respiratory status/PT  Family discussion: Plan of care discussed with patient, aware and agreeable   Disposition:  possible dg to floor versus east, needs aggressive PT    Danii López MD  s. [phone] Patient is a 56-year-old, female patient, PMHx: Hypertension, presented to the ED for Shortness of breath and found to have COVID19 PNA    Acute Hypoxic Respiratory Failure secondary to COVID 19 PNA  Inadequate Oral intake  Currently on 4L NC saturating 98%  Requests NRB at times  s/p RDV therapy and s/p Levaquin x7d (03/12-03/19)  CTA 03/29: No PE  LE Duplex (04/01): b/l soleal vein thrombosis  BNP 04/02 61  On therapeutic Lovenox   Taper decadron to 4mg today  Taper down oxygen requirements as tolerated  aggressive PT  dietary following for nutrition    HTN  Sinus tachycardia  Metoprolol tartrate 25 BID    DM2, new onset  Hba1c 6.9 (03/12)  Lantus 8units with ISS  Once downgraded, endocrine consult for outpt tx recommendations    #Progress Note Handoff  Pending (specify):   improvement in respiratory status/PT  Family discussion: Plan of care discussed with patient, aware and agreeable   Disposition:  possible dg to floor versus east, needs aggressive PT    Danii López MD  s. [phone]

## 2021-04-12 LAB
ALBUMIN SERPL ELPH-MCNC: 3.6 G/DL — SIGNIFICANT CHANGE UP (ref 3.5–5.2)
ALP SERPL-CCNC: 65 U/L — SIGNIFICANT CHANGE UP (ref 30–115)
ALT FLD-CCNC: 34 U/L — SIGNIFICANT CHANGE UP (ref 0–41)
ANION GAP SERPL CALC-SCNC: 11 MMOL/L — SIGNIFICANT CHANGE UP (ref 7–14)
APPEARANCE UR: CLEAR — SIGNIFICANT CHANGE UP
AST SERPL-CCNC: 27 U/L — SIGNIFICANT CHANGE UP (ref 0–41)
BACTERIA # UR AUTO: ABNORMAL
BASOPHILS # BLD AUTO: 0.05 K/UL — SIGNIFICANT CHANGE UP (ref 0–0.2)
BASOPHILS NFR BLD AUTO: 0.4 % — SIGNIFICANT CHANGE UP (ref 0–1)
BILIRUB SERPL-MCNC: 0.2 MG/DL — SIGNIFICANT CHANGE UP (ref 0.2–1.2)
BILIRUB UR-MCNC: NEGATIVE — SIGNIFICANT CHANGE UP
BUN SERPL-MCNC: 21 MG/DL — HIGH (ref 10–20)
CALCIUM SERPL-MCNC: 9.5 MG/DL — SIGNIFICANT CHANGE UP (ref 8.5–10.1)
CHLORIDE SERPL-SCNC: 101 MMOL/L — SIGNIFICANT CHANGE UP (ref 98–110)
CO2 SERPL-SCNC: 28 MMOL/L — SIGNIFICANT CHANGE UP (ref 17–32)
COLOR SPEC: YELLOW — SIGNIFICANT CHANGE UP
CREAT SERPL-MCNC: 0.7 MG/DL — SIGNIFICANT CHANGE UP (ref 0.7–1.5)
DIFF PNL FLD: NEGATIVE — SIGNIFICANT CHANGE UP
EOSINOPHIL # BLD AUTO: 0.74 K/UL — HIGH (ref 0–0.7)
EOSINOPHIL NFR BLD AUTO: 6.5 % — SIGNIFICANT CHANGE UP (ref 0–8)
EPI CELLS # UR: 6 /HPF — HIGH (ref 0–5)
GLUCOSE BLDC GLUCOMTR-MCNC: 124 MG/DL — HIGH (ref 70–99)
GLUCOSE BLDC GLUCOMTR-MCNC: 129 MG/DL — HIGH (ref 70–99)
GLUCOSE BLDC GLUCOMTR-MCNC: 138 MG/DL — HIGH (ref 70–99)
GLUCOSE BLDC GLUCOMTR-MCNC: 142 MG/DL — HIGH (ref 70–99)
GLUCOSE SERPL-MCNC: 122 MG/DL — HIGH (ref 70–99)
GLUCOSE UR QL: NEGATIVE — SIGNIFICANT CHANGE UP
HCT VFR BLD CALC: 39.4 % — SIGNIFICANT CHANGE UP (ref 37–47)
HGB BLD-MCNC: 12.2 G/DL — SIGNIFICANT CHANGE UP (ref 12–16)
HYALINE CASTS # UR AUTO: 5 /LPF — SIGNIFICANT CHANGE UP (ref 0–7)
IMM GRANULOCYTES NFR BLD AUTO: 2.6 % — HIGH (ref 0.1–0.3)
KETONES UR-MCNC: NEGATIVE — SIGNIFICANT CHANGE UP
LEUKOCYTE ESTERASE UR-ACNC: ABNORMAL
LYMPHOCYTES # BLD AUTO: 19.9 % — LOW (ref 20.5–51.1)
LYMPHOCYTES # BLD AUTO: 2.26 K/UL — SIGNIFICANT CHANGE UP (ref 1.2–3.4)
MCHC RBC-ENTMCNC: 28.2 PG — SIGNIFICANT CHANGE UP (ref 27–31)
MCHC RBC-ENTMCNC: 31 G/DL — LOW (ref 32–37)
MCV RBC AUTO: 91.2 FL — SIGNIFICANT CHANGE UP (ref 81–99)
MONOCYTES # BLD AUTO: 0.74 K/UL — HIGH (ref 0.1–0.6)
MONOCYTES NFR BLD AUTO: 6.5 % — SIGNIFICANT CHANGE UP (ref 1.7–9.3)
NEUTROPHILS # BLD AUTO: 7.27 K/UL — HIGH (ref 1.4–6.5)
NEUTROPHILS NFR BLD AUTO: 64.1 % — SIGNIFICANT CHANGE UP (ref 42.2–75.2)
NITRITE UR-MCNC: NEGATIVE — SIGNIFICANT CHANGE UP
NRBC # BLD: 0 /100 WBCS — SIGNIFICANT CHANGE UP (ref 0–0)
PH UR: 6 — SIGNIFICANT CHANGE UP (ref 5–8)
PLATELET # BLD AUTO: 369 K/UL — SIGNIFICANT CHANGE UP (ref 130–400)
POTASSIUM SERPL-MCNC: 3.9 MMOL/L — SIGNIFICANT CHANGE UP (ref 3.5–5)
POTASSIUM SERPL-SCNC: 3.9 MMOL/L — SIGNIFICANT CHANGE UP (ref 3.5–5)
PROT SERPL-MCNC: 6.3 G/DL — SIGNIFICANT CHANGE UP (ref 6–8)
PROT UR-MCNC: ABNORMAL
RBC # BLD: 4.32 M/UL — SIGNIFICANT CHANGE UP (ref 4.2–5.4)
RBC # FLD: 12.7 % — SIGNIFICANT CHANGE UP (ref 11.5–14.5)
RBC CASTS # UR COMP ASSIST: 2 /HPF — SIGNIFICANT CHANGE UP (ref 0–4)
SODIUM SERPL-SCNC: 140 MMOL/L — SIGNIFICANT CHANGE UP (ref 135–146)
SP GR SPEC: 1.03 — HIGH (ref 1.01–1.03)
UROBILINOGEN FLD QL: ABNORMAL
WBC # BLD: 11.35 K/UL — HIGH (ref 4.8–10.8)
WBC # FLD AUTO: 11.35 K/UL — HIGH (ref 4.8–10.8)
WBC UR QL: 7 /HPF — HIGH (ref 0–5)

## 2021-04-12 PROCEDURE — 99232 SBSQ HOSP IP/OBS MODERATE 35: CPT

## 2021-04-12 RX ADMIN — CHLORHEXIDINE GLUCONATE 1 APPLICATION(S): 213 SOLUTION TOPICAL at 05:14

## 2021-04-12 RX ADMIN — ENOXAPARIN SODIUM 100 MILLIGRAM(S): 100 INJECTION SUBCUTANEOUS at 17:44

## 2021-04-12 RX ADMIN — Medication 30 MILLIGRAM(S): at 05:15

## 2021-04-12 RX ADMIN — ENOXAPARIN SODIUM 100 MILLIGRAM(S): 100 INJECTION SUBCUTANEOUS at 05:14

## 2021-04-12 RX ADMIN — Medication 25 MILLIGRAM(S): at 17:45

## 2021-04-12 RX ADMIN — PANTOPRAZOLE SODIUM 40 MILLIGRAM(S): 20 TABLET, DELAYED RELEASE ORAL at 05:24

## 2021-04-12 RX ADMIN — INSULIN GLARGINE 8 UNIT(S): 100 INJECTION, SOLUTION SUBCUTANEOUS at 22:56

## 2021-04-12 RX ADMIN — Medication 1 TABLET(S): at 11:15

## 2021-04-12 RX ADMIN — Medication 25 MILLIGRAM(S): at 05:15

## 2021-04-12 RX ADMIN — Medication 4 MILLIGRAM(S): at 05:17

## 2021-04-12 RX ADMIN — Medication 650 MILLIGRAM(S): at 09:25

## 2021-04-12 NOTE — OCCUPATIONAL THERAPY INITIAL EVALUATION ADULT - PLANNED THERAPY INTERVENTIONS, OT EVAL
endurance training/ADL retraining/balance training/cognitive, visual perceptual/joint mobilization/motor coordination training/strengthening/transfer training

## 2021-04-12 NOTE — PROGRESS NOTE ADULT - SUBJECTIVE AND OBJECTIVE BOX
PAULA ELMORE 56y Female  MRN#: [id number]   CODE STATUS:________      SUBJECTIVE  Patient is a 56y old Female who presents with a chief complaint of Shortness of breath (12 Apr 2021 10:55)  Currently admitted to medicine with the primary diagnosis of Acute respiratory failure with hypoxia      Today is hospital day 33d.  No acute events overnight.          OBJECTIVE  PAST MEDICAL & SURGICAL HISTORY    ALLERGIES:  Allergy Status Unknown    MEDICATIONS:  STANDING MEDICATIONS  chlorhexidine 4% Liquid 1 Application(s) Topical daily  dexAMETHasone  Injectable 4 milliGRAM(s) IV Push daily  dextrose 40% Gel 15 Gram(s) Oral once  dextrose 5%. 1000 milliLiter(s) IV Continuous <Continuous>  enoxaparin Injectable 100 milliGRAM(s) SubCutaneous two times a day  insulin glargine Injectable (LANTUS) 8 Unit(s) SubCutaneous at bedtime  insulin lispro (ADMELOG) corrective regimen sliding scale   SubCutaneous three times a day before meals  metoprolol tartrate 25 milliGRAM(s) Oral two times a day  multivitamin 1 Tablet(s) Oral daily  NIFEdipine XL 30 milliGRAM(s) Oral daily  pantoprazole    Tablet 40 milliGRAM(s) Oral before breakfast    PRN MEDICATIONS  acetaminophen   Tablet .. 650 milliGRAM(s) Oral every 6 hours PRN  guaifenesin/dextromethorphan  Syrup 10 milliLiter(s) Oral every 6 hours PRN      VITAL SIGNS: Last 24 Hours  T(C): 38.3 (12 Apr 2021 09:04), Max: 38.3 (12 Apr 2021 09:04)  T(F): 101 (12 Apr 2021 09:04), Max: 101 (12 Apr 2021 09:04)  HR: 76 (12 Apr 2021 09:04) (75 - 85)  BP: 116/54 (12 Apr 2021 09:04) (116/54 - 157/66)  BP(mean): --  RR: 20 (12 Apr 2021 09:04) (18 - 20)  SpO2: 96% (12 Apr 2021 09:04) (94% - 100%)    LABS:                        12.2   11.35 )-----------( 369      ( 12 Apr 2021 05:57 )             39.4     04-12    140  |  101  |  21<H>  ----------------------------<  122<H>  3.9   |  28  |  0.7    Ca    9.5      12 Apr 2021 05:57    TPro  6.3  /  Alb  3.6  /  TBili  0.2  /  DBili  x   /  AST  27  /  ALT  34  /  AlkPhos  65  04-12                  RADIOLOGY:      PHYSICAL EXAM:    GENERAL: NAD, well-developed, AAOx3  HEENT:  Atraumatic, Normocephalic. EOMI, PERRLA, conjunctiva and sclera clear, No JVD  PULMONARY: Clear to auscultation bilaterally; No wheeze  CARDIOVASCULAR: Regular rate and rhythm; No murmurs, rubs, or gallops  GASTROINTESTINAL: Soft, Nontender, Nondistended; Bowel sounds present  MUSCULOSKELETAL:  2+ Peripheral Pulses, No clubbing, cyanosis, or edema  NEUROLOGY: non-focal  SKIN: No rashes or lesions

## 2021-04-12 NOTE — PROGRESS NOTE ADULT - ASSESSMENT
Patient is a 56-year-old, female patient, PMHx: Hypertension, presented to the ED for Shortness of breath and found to have COVID19 PNA    Acute Hypoxic Respiratory Failure secondary to COVID 19 PNA  Inadequate Oral intake  Currently on 4L NC saturating 96%  desaturated to 70s yesterday while working with PT  s/p RDV therapy and s/p Levaquin x7d (03/12-03/19)  CTA 03/29: No PE, LE Duplex (04/01): b/l soleal vein thrombosis  BNP 04/02 61  On therapeutic Lovenox   Febrile to 101 today, check Procal, ua, urine and blood cx  repeat chest xray   Taper down oxygen requirements as tolerated  aggressive PT  dietary following for nutrition    HTN  Sinus tachycardia  Metoprolol tartrate 25 BID    DM2, new onset  Hba1c 6.9 (03/12)  Lantus 8units with ISS  Once downgraded, endocrine consult for outpt tx recommendations    #Progress Note Handoff  Pending (specify):   improvement in respiratory status/PT, sepsis work up  Family discussion: Plan of care discussed with patient, aware and agreeable   Disposition: home versus east  Danii López MD  s. [phone]

## 2021-04-12 NOTE — OCCUPATIONAL THERAPY INITIAL EVALUATION ADULT - PERTINENT HX OF CURRENT PROBLEM, REHAB EVAL
Pt is a left hand dominant female admitted 3/10 for Shortness of breath. Pt started having dyspnea two days before presenting to the ED. Pt found to be COVID + .

## 2021-04-12 NOTE — PROGRESS NOTE ADULT - SUBJECTIVE AND OBJECTIVE BOX
PAULA ELMORE  56y Female    CHIEF COMPLAINT:    Patient is a 56y old  Female who presents with a chief complaint of Shortness of breath (11 Apr 2021 10:48)      INTERVAL HPI/OVERNIGHT EVENTS:    Patient seen and examined. No acute events overnight. Desaturated yesterday while working with PT    ROS: All other systems are negative.    Vital Signs:    T(F): 101 (04-12-21 @ 09:04), Max: 101 (04-12-21 @ 09:04)  HR: 76 (04-12-21 @ 09:04) (75 - 99)  BP: 116/54 (04-12-21 @ 09:04) (116/54 - 157/66)  RR: 20 (04-12-21 @ 09:04) (18 - 20)  SpO2: 96% (04-12-21 @ 09:04) (94% - 100%)    11 Apr 2021 07:01  -  12 Apr 2021 07:00  --------------------------------------------------------  IN: 0 mL / OUT: 400 mL / NET: -400 mL    POCT Blood Glucose.: 124 mg/dL (12 Apr 2021 07:51)  POCT Blood Glucose.: 150 mg/dL (11 Apr 2021 20:17)  POCT Blood Glucose.: 165 mg/dL (11 Apr 2021 15:49)  POCT Blood Glucose.: 272 mg/dL (11 Apr 2021 11:36)    PHYSICAL EXAM:    GENERAL:  NAD  SKIN: No rashes or lesions  HEENT: Atraumatic. Normocephalic  NECK: Supple, No JVD.  PULMONARY: CTA B/L. No wheezing. No rales  CVS: Normal S1, S2. Rate and Rhythm are regular.   ABDOMEN/GI: Soft, Nontender, Nondistended; BS present  MSK: No clubbing or cyanosis  NEUROLOGIC:  No motor or sensory deficit.  PSYCH: Alert & oriented x 3, normal affect    Consultant(s) Notes Reviewed:  [x ] YES  [ ] NO  Care Discussed with Consultants/Other Providers [ x] YES  [ ] NO    LABS:                        12.2   11.35 )-----------( 369      ( 12 Apr 2021 05:57 )             39.4     140  |  101  |  21<H>  ----------------------------<  122<H>  3.9   |  28  |  0.7    Ca    9.5      12 Apr 2021 05:57    TPro  6.3  /  Alb  3.6  /  TBili  0.2  /  DBili  x   /  AST  27  /  ALT  34  /  AlkPhos  65  04-12    RADIOLOGY & ADDITIONAL TESTS:  Imaging or report Personally Reviewed:  [x] YES  [ ] NO  EKG reviewed: [x] YES  [ ] NO    Medications:  Standing  chlorhexidine 4% Liquid 1 Application(s) Topical daily  dexAMETHasone  Injectable 4 milliGRAM(s) IV Push daily  dextrose 40% Gel 15 Gram(s) Oral once  dextrose 5%. 1000 milliLiter(s) IV Continuous <Continuous>  enoxaparin Injectable 100 milliGRAM(s) SubCutaneous two times a day  insulin glargine Injectable (LANTUS) 8 Unit(s) SubCutaneous at bedtime  insulin lispro (ADMELOG) corrective regimen sliding scale   SubCutaneous three times a day before meals  metoprolol tartrate 25 milliGRAM(s) Oral two times a day  multivitamin 1 Tablet(s) Oral daily  NIFEdipine XL 30 milliGRAM(s) Oral daily  pantoprazole    Tablet 40 milliGRAM(s) Oral before breakfast    PRN Meds  acetaminophen   Tablet .. 650 milliGRAM(s) Oral every 6 hours PRN  guaifenesin/dextromethorphan  Syrup 10 milliLiter(s) Oral every 6 hours PRN

## 2021-04-12 NOTE — PROGRESS NOTE ADULT - ASSESSMENT
56 year old F with PMHx of HTN, admitted for acute hypoxemic respiratory failure due to COVID PNA.    #Acute hypoxemic respiratory failure secondary to COVID-19 pneumonia  - COVID-19 PCR positive  - Isolation precautions (contact, droplet, airborne)  - Chest X-ray - stable bilateral opacities  - Patient is saturating 98% on O2 NC 4L in addition to NRB  - LE Duplex negative 3/23, CT Angio 3/29 negative for PE  - LE duplex performed - bilateral soleal vein thrombosis, no deep DVTs, repeat LE duplex revealed R gastrocnemius DVT   - s/p RDV, dexamethasone, course of Levaquin  - C/w Dexamethasone 6mg once daily while in hospital  - C/w therapeutic Lovenox   - Titrate supplemental O2 to maintain SpO2 92-96%  - febrile at 101 today, f/u blood culture, urine, and procal.    #HTN  #Sinus tachycardia  - C/w Metoprolol 25 mg BID    #DMII  - HbA1c 6.9%  - FS well controlled  - C/w Lantus 20 U  -Once downgraded, endocrine consult for outpt tx recommendations    DVT ppx: Lovenox 100 mg BID  GI ppx: PPI  Diet: CC  Activity: IAT  Full code  Dispo: pending downgrade

## 2021-04-12 NOTE — OCCUPATIONAL THERAPY INITIAL EVALUATION ADULT - IMPAIRMENTS CONTRIBUTING IMPAIRED BED MOBILITY, REHAB EVAL
Pt's 02 saturation dropped to 69% seated EOB, slow to rebound with rest and deep breathing, Pt returned to semi reclined position in bed. RN notified/impaired balance/decreased strength

## 2021-04-12 NOTE — CHART NOTE - NSCHARTNOTEFT_GEN_A_CORE
Registered Dietitian Follow-Up     Patient Profile Reviewed                           Yes [x]   No []     Nutrition History Previously Obtained        Yes [x]  No []      Pertinent Subjective Information: Unable to conduct face to face assessment d/t COVID-19 isolation precautions. Pt sleeping in room at time of assessment, did not pickup room phone. RN reports intake improved slightly but continues to vary. RN notes pt consumed ~30% breakfast but 50% lunch, takens Glucerna 1-2x/day. As per EMR weekend documentation intake 40-50%. Continue current diet & supplement order at this time. Appetite stimulant not ordered. Updated recs at end of document d/w LIP (Dr. Ayon).      Pertinent Medical Interventions:   1. Acute hypoxemic respiratory failure 2/2 COVID-19 PNA--satting 98% on 4L NC + NRB. CXR stable b/l opacities. repeat LE duplex revealed R gastrocnemius DVT. febrile today, f/u BCx, UCx & procal.   2. T2DM--lantus 20u. endocrine c/s once downgraded   Dispo: awaiting downgrade to medicine floor      Diet order: Consistent Carbohydrate diet evening snack + Glucerna TID      Anthropometrics:  - Ht. 172.7cm  - Wt. 99.6kg (3/11/21) - still no new wt. need to update as no wt taken x1 month. RD d/w RN today.  - BMI 33.4  - IBW 63.6kg      Pertinent Lab Data: (4/12/21) WBC 11.35, POCT 124/142, glucose 122, BUN 21     Pertinent Meds: lovenox, lantus, SSI, decadron, procardia, multivitamin, metoprolol, protonix      Physical Findings:  - Appearance: sleeping soundly. alert, disoriented per RN    - GI function: none per RN, LBM 4/10   - Tubes: no feeding tubes  - Oral/Mouth cavity: none reported   - Skin: intact; no edema noted      Nutrition Requirements  Weight Used: 63.6kg IBW d/t BMI >30 (continued per 3/20 RD assessment)      Energy: 1414-1246 kcal/day (25-30 kcal/kg IBW).   Protein: 76-89 g/day (1.2-1.4 g/kg IBW).   Fluids: 1 mL/kcal or per LIP     Nutrient Intake: improving 30-50% + 1-2 Glucerna/day but not at goal rate      Previous Nutrition Diagnosis: inadequate protein/energy intake     Nutrition Intervention: Meals & Snacks, medical food supplements, nutrition related medication mgmt  Recommend:  1. If not contraindicated, consider appetite stimulant medication.   2. Continue Consistent Carbohydrate + Glucerna TID.  3. Consider adding daily multivitamin w. minerals.      Goal/Expected Outcome: Pt to consistently consume >50% both meals & supplements upon f/u in 3 days.      Indicator/Monitoring: diet order, energy intake, nutrition related labs, body composition, NFPF.

## 2021-04-12 NOTE — OCCUPATIONAL THERAPY INITIAL EVALUATION ADULT - GENERAL OBSERVATIONS, REHAB EVAL
Pt encountered semi brown in bed, +tele, +pulseoxi, +BP cuff LUE, +4 LITERS 02 VIA NC. Agreeable to OT IE. Pt left as found with all lines intact. 02 decreased to 69% seated EOB. Pt returned to semi reclined position. 02 increased slowly to 91-93% with breathing strategies and rest. RN notified and aware.

## 2021-04-12 NOTE — OCCUPATIONAL THERAPY INITIAL EVALUATION ADULT - VISUAL ASSESSMENT: TRACKING
August 1, 2019      Zaheer Carvajal MD  [address] [facility]  [place] [address]           [place] [facility]  [address] [address]  Phone: [phone]  Fax: [phone]          Patient: Tawnya Herrera   MR Number: [id number]   YOB: 1986   Date of Visit: 8/1/2019       Dear Dr. Zaheer Carvajal:    Thank you for referring Tawnya Herrera to me for evaluation. Attached you will find relevant portions of my assessment and plan of care.    If you have questions, please do not hesitate to call me. I look forward to following Tawnya Herrera along with you.    Sincerely,    Kirstin Arredondo PA-C    Enclosure  CC:  No Recipients    If you would like to receive this communication electronically, please contact [email or web address] or [phone] to request more information on [facility] [employer] access.    For providers and/or their staff who would like to refer a patient to [facility], please contact us through our one-stop-shop provider referral line, [facility], at [phone].    If you feel you have received this communication in error or would no longer like to receive these types of communications, please e-mail [email or web address]         
normal

## 2021-04-12 NOTE — CHART NOTE - NSCHARTNOTEFT_GEN_A_CORE
CCU Transfer Note    Transfer from: Ceu    Transfer to: (X  ) Medicine    (  ) Telemetry    (  ) RCU                               (  ) Palliative    (  ) Stroke Unit    (  ) MICU    (  ) __________________    Accepting Physician:    Signout given to:     HPI / Ceu COURSE:    56-year-old, female patient, PMHx: Hypertension, presented to the ED for Shortness of breath. Histoy goes back to 2 days prior to presentation when the patient started having dyspnea. New-onset, aggravated by exertion, no alleviating factors. No orthopnea. The patient had an associated cough, productive of whitish sputum. She denies any fever ( but was febrile in the ED). No sore throat, no anosmia no loss of taste. No skin contact, no recent travel.     SDU: patient is currently on NC 4L sating well and hospital course was complicated by superficial DVTs. Patient is currently on lovenox therapeutic. Downgraded held yesterday as patient was desat in low 80's. Patient underwent PT today, stable now for downgrade.  Vital Signs Last 24 Hrs  T(C): 36.7 (11 Apr 2021 12:39), Max: 36.7 (10 Apr 2021 16:30)  T(F): 98.1 (11 Apr 2021 12:39), Max: 98.1 (10 Apr 2021 16:30)  HR: 99 (11 Apr 2021 12:39) (67 - 99)  BP: 121/57 (11 Apr 2021 12:39) (112/87 - 133/59)  BP(mean): 82 (11 Apr 2021 12:39) (82 - 94)  RR: 18 (11 Apr 2021 12:39) (18 - 20)  SpO2: 95% (11 Apr 2021 07:45) (95% - 100%)    I&O's Summary    10 Apr 2021 07:01  -  11 Apr 2021 07:00  --------------------------------------------------------  IN: 0 mL / OUT: 650 mL / NET: -650 mL        LABS:                               13.0   12.08 )-----------( 359      ( 11 Apr 2021 08:09 )             42.6       04-11    140  |  99  |  24<H>  ----------------------------<  152<H>  3.8   |  26  |  0.7    Ca    9.3      11 Apr 2021 08:09    TPro  6.7  /  Alb  3.8  /  TBili  0.3  /  DBili  x   /  AST  25  /  ALT  35  /  AlkPhos  73  04-11        ASSESSMENT & PLAN:     56 year old F with PMHx of HTN, admitted for acute hypoxemic respiratory failure due to COVID PNA.    #Acute hypoxemic respiratory failure secondary to COVID-19 pneumonia  - COVID-19 PCR positive  - Isolation precautions (contact, droplet, airborne)  - Chest X-ray - stable bilateral opacities  - Patient is saturating 98% on O2 NC 4L in addition to NRB  - LE Duplex negative 3/23, CT Angio 3/29 negative for PE  - LE duplex performed - bilateral soleal vein thrombosis, no deep DVTs, repeat LE duplex revealed R gastrocnemius DVT   - s/p RDV, dexamethasone, course of Levaquin  - C/w Dexamethasone 6mg once daily while in hospital  - C/w therapeutic Lovenox   - Titrate supplemental O2 to maintain SpO2 92-96%    #HTN  #Sinus tachycardia  - C/w Metoprolol 25 mg BID    #DMII  - HbA1c 6.9%  - FS well controlled  - C/w Lantus 20 U    DVT ppx: Lovenox 100 mg BID  GI ppx: PPI  Diet: CC  Activity: IAT  Full code  Dispo: downgrade to floor CCU Transfer Note    Transfer from: Ceu    Transfer to: (X  ) Medicine    (  ) Telemetry    (  ) RCU                               (  ) Palliative    (  ) Stroke Unit    (  ) MICU    (  ) __________________    Accepting Physician:    Signout given to:     HPI / Ceu COURSE:    56-year-old, female patient, PMHx: Hypertension, presented to the ED for Shortness of breath. Histoy goes back to 2 days prior to presentation when the patient started having dyspnea. New-onset, aggravated by exertion, no alleviating factors. No orthopnea. The patient had an associated cough, productive of whitish sputum. She denies any fever ( but was febrile in the ED). No sore throat, no anosmia no loss of taste. No skin contact, no recent travel.     SDU: patient is currently on NC 4L sating well and hospital course was complicated by superficial DVTs. Patient is currently on lovenox therapeutic. Downgraded held yesterday as patient was desat in low 80's. Patient underwent PT today, stable now for downgrade.  Vital Signs Last 24 Hrs  T(C): 36.7 (11 Apr 2021 12:39), Max: 36.7 (10 Apr 2021 16:30)  T(F): 98.1 (11 Apr 2021 12:39), Max: 98.1 (10 Apr 2021 16:30)  HR: 99 (11 Apr 2021 12:39) (67 - 99)  BP: 121/57 (11 Apr 2021 12:39) (112/87 - 133/59)  BP(mean): 82 (11 Apr 2021 12:39) (82 - 94)  RR: 18 (11 Apr 2021 12:39) (18 - 20)  SpO2: 95% (11 Apr 2021 07:45) (95% - 100%)    I&O's Summary    10 Apr 2021 07:01  -  11 Apr 2021 07:00  --------------------------------------------------------  IN: 0 mL / OUT: 650 mL / NET: -650 mL        LABS:                               13.0   12.08 )-----------( 359      ( 11 Apr 2021 08:09 )             42.6       04-11    140  |  99  |  24<H>  ----------------------------<  152<H>  3.8   |  26  |  0.7    Ca    9.3      11 Apr 2021 08:09    TPro  6.7  /  Alb  3.8  /  TBili  0.3  /  DBili  x   /  AST  25  /  ALT  35  /  AlkPhos  73  04-11        ASSESSMENT & PLAN:     56 year old F with PMHx of HTN, admitted for acute hypoxemic respiratory failure due to COVID PNA.    #Acute hypoxemic respiratory failure secondary to COVID-19 pneumonia  - COVID-19 PCR positive  - Isolation precautions (contact, droplet, airborne)  - Chest X-ray - stable bilateral opacities  - Patient is saturating 98% on O2 NC 4L in addition to NRB  - LE Duplex negative 3/23, CT Angio 3/29 negative for PE  - LE duplex performed - bilateral soleal vein thrombosis, no deep DVTs, repeat LE duplex revealed R gastrocnemius DVT   - s/p RDV, dexamethasone, course of Levaquin  - C/w Dexamethasone 6mg once daily while in hospital  - C/w therapeutic Lovenox   - Titrate supplemental O2 to maintain SpO2 92-96%  - febrile at 101 today, f/u blood culture, urine, and procal.    #HTN  #Sinus tachycardia  - C/w Metoprolol 25 mg BID    #DMII  - HbA1c 6.9%  - FS well controlled  - C/w Lantus 20 U  -Once downgraded, endocrine consult for outpt tx recommendations    DVT ppx: Lovenox 100 mg BID  GI ppx: PPI  Diet: CC  Activity: IAT  Full code  Dispo: pending downgrade

## 2021-04-13 LAB
ALBUMIN SERPL ELPH-MCNC: 3.4 G/DL — LOW (ref 3.5–5.2)
ALP SERPL-CCNC: 63 U/L — SIGNIFICANT CHANGE UP (ref 30–115)
ALT FLD-CCNC: 31 U/L — SIGNIFICANT CHANGE UP (ref 0–41)
ANION GAP SERPL CALC-SCNC: 10 MMOL/L — SIGNIFICANT CHANGE UP (ref 7–14)
AST SERPL-CCNC: 19 U/L — SIGNIFICANT CHANGE UP (ref 0–41)
BASOPHILS # BLD AUTO: 0.06 K/UL — SIGNIFICANT CHANGE UP (ref 0–0.2)
BASOPHILS NFR BLD AUTO: 0.6 % — SIGNIFICANT CHANGE UP (ref 0–1)
BILIRUB SERPL-MCNC: 0.3 MG/DL — SIGNIFICANT CHANGE UP (ref 0.2–1.2)
BUN SERPL-MCNC: 18 MG/DL — SIGNIFICANT CHANGE UP (ref 10–20)
CALCIUM SERPL-MCNC: 9.7 MG/DL — SIGNIFICANT CHANGE UP (ref 8.5–10.1)
CHLORIDE SERPL-SCNC: 100 MMOL/L — SIGNIFICANT CHANGE UP (ref 98–110)
CO2 SERPL-SCNC: 31 MMOL/L — SIGNIFICANT CHANGE UP (ref 17–32)
CREAT SERPL-MCNC: 0.7 MG/DL — SIGNIFICANT CHANGE UP (ref 0.7–1.5)
EOSINOPHIL # BLD AUTO: 0.96 K/UL — HIGH (ref 0–0.7)
EOSINOPHIL NFR BLD AUTO: 10.1 % — HIGH (ref 0–8)
GLUCOSE BLDC GLUCOMTR-MCNC: 114 MG/DL — HIGH (ref 70–99)
GLUCOSE BLDC GLUCOMTR-MCNC: 124 MG/DL — HIGH (ref 70–99)
GLUCOSE BLDC GLUCOMTR-MCNC: 132 MG/DL — HIGH (ref 70–99)
GLUCOSE BLDC GLUCOMTR-MCNC: 139 MG/DL — HIGH (ref 70–99)
GLUCOSE SERPL-MCNC: 104 MG/DL — HIGH (ref 70–99)
HCT VFR BLD CALC: 38.4 % — SIGNIFICANT CHANGE UP (ref 37–47)
HGB BLD-MCNC: 11.8 G/DL — LOW (ref 12–16)
IMM GRANULOCYTES NFR BLD AUTO: 2.5 % — HIGH (ref 0.1–0.3)
LYMPHOCYTES # BLD AUTO: 1.74 K/UL — SIGNIFICANT CHANGE UP (ref 1.2–3.4)
LYMPHOCYTES # BLD AUTO: 18.3 % — LOW (ref 20.5–51.1)
MAGNESIUM SERPL-MCNC: 2.2 MG/DL — SIGNIFICANT CHANGE UP (ref 1.8–2.4)
MCHC RBC-ENTMCNC: 28.3 PG — SIGNIFICANT CHANGE UP (ref 27–31)
MCHC RBC-ENTMCNC: 30.7 G/DL — LOW (ref 32–37)
MCV RBC AUTO: 92.1 FL — SIGNIFICANT CHANGE UP (ref 81–99)
MONOCYTES # BLD AUTO: 0.62 K/UL — HIGH (ref 0.1–0.6)
MONOCYTES NFR BLD AUTO: 6.5 % — SIGNIFICANT CHANGE UP (ref 1.7–9.3)
NEUTROPHILS # BLD AUTO: 5.91 K/UL — SIGNIFICANT CHANGE UP (ref 1.4–6.5)
NEUTROPHILS NFR BLD AUTO: 62 % — SIGNIFICANT CHANGE UP (ref 42.2–75.2)
NRBC # BLD: 0 /100 WBCS — SIGNIFICANT CHANGE UP (ref 0–0)
PLATELET # BLD AUTO: 354 K/UL — SIGNIFICANT CHANGE UP (ref 130–400)
POTASSIUM SERPL-MCNC: 3.6 MMOL/L — SIGNIFICANT CHANGE UP (ref 3.5–5)
POTASSIUM SERPL-SCNC: 3.6 MMOL/L — SIGNIFICANT CHANGE UP (ref 3.5–5)
PROT SERPL-MCNC: 6 G/DL — SIGNIFICANT CHANGE UP (ref 6–8)
RBC # BLD: 4.17 M/UL — LOW (ref 4.2–5.4)
RBC # FLD: 12.9 % — SIGNIFICANT CHANGE UP (ref 11.5–14.5)
SODIUM SERPL-SCNC: 141 MMOL/L — SIGNIFICANT CHANGE UP (ref 135–146)
WBC # BLD: 9.53 K/UL — SIGNIFICANT CHANGE UP (ref 4.8–10.8)
WBC # FLD AUTO: 9.53 K/UL — SIGNIFICANT CHANGE UP (ref 4.8–10.8)

## 2021-04-13 PROCEDURE — 99232 SBSQ HOSP IP/OBS MODERATE 35: CPT

## 2021-04-13 RX ADMIN — ENOXAPARIN SODIUM 100 MILLIGRAM(S): 100 INJECTION SUBCUTANEOUS at 05:33

## 2021-04-13 RX ADMIN — Medication 25 MILLIGRAM(S): at 05:34

## 2021-04-13 RX ADMIN — Medication 4 MILLIGRAM(S): at 05:34

## 2021-04-13 RX ADMIN — Medication 25 MILLIGRAM(S): at 18:43

## 2021-04-13 RX ADMIN — ENOXAPARIN SODIUM 100 MILLIGRAM(S): 100 INJECTION SUBCUTANEOUS at 21:39

## 2021-04-13 RX ADMIN — PANTOPRAZOLE SODIUM 40 MILLIGRAM(S): 20 TABLET, DELAYED RELEASE ORAL at 05:34

## 2021-04-13 RX ADMIN — Medication 30 MILLIGRAM(S): at 05:34

## 2021-04-13 RX ADMIN — Medication 1 TABLET(S): at 11:30

## 2021-04-13 RX ADMIN — CHLORHEXIDINE GLUCONATE 1 APPLICATION(S): 213 SOLUTION TOPICAL at 05:34

## 2021-04-13 RX ADMIN — INSULIN GLARGINE 8 UNIT(S): 100 INJECTION, SOLUTION SUBCUTANEOUS at 21:39

## 2021-04-13 NOTE — PROGRESS NOTE ADULT - ASSESSMENT
57 yo F with PMHx of HTN, T2DM admitted for acute hypoxemic respiratory failure due to COVID PNA. Downgrade from CEU.     #Acute hypoxemic respiratory failure secondary to COVID-19 pneumonia  - Patient is currently on O2 NC 5L > wean as tolerated   - Chest X-ray 4/8 - stable bilateral opacities  - inflammatory markers:  --d-dimer 2567 3/28  --procalc 0.03 4/7  --ferritin 691 4/7  --CRP 47 4/7  - LE Duplex negative 3/23, CT Angio 3/29 negative for PE  - LE duplex 4/1: bilateral soleal vein thrombosis, LE duplex 4/6: R gastrocnemius DVT   - s/p RDV, course of Levaquin (3/12-3/19)  - was on dexamethasone (3/11-3/31), stopped then restarted at 6mg 4/6-4/11, tapering now to 4mg (4/11- )  - C/w therapeutic Lovenox 100mg BID  - febrile 4/12 to 101F, f/u blood culture 4/12, urine cx 4/12, UA neg, and procalc    #HTN  #Sinus tachycardia  - C/w Metoprolol 25 mg BID    #DMII  HbA1c 6.9%  - FS well controlled  - C/w Lantus 20 U  -Once downgraded, endocrine consult for outpt tx recommendations    DVT ppx: therapeutic lovenox  GI ppx:  pantoprazole 40mg qD  Labs: CMP+Mg, (Phos), CBC (Coag)  Diet: Consistent Carb  PT/Rehab: Evaluation pending  Activity: Ambulate as tolerated  Dispo:  pending clinical improvement  FULL CODE 57 yo F with PMHx of HTN, T2DM admitted for acute hypoxemic respiratory failure due to COVID PNA. Downgrade from CEU.     #Acute hypoxemic respiratory failure secondary to COVID-19 pneumonia  - Patient is currently on O2 NC 5L > wean as tolerated   - Chest X-ray 4/8 - stable bilateral opacities  - inflammatory markers:  --d-dimer 2567 3/28  --procalc 0.03 4/7  --ferritin 691 4/7  --CRP 47 4/7  - LE Duplex negative 3/23, CT Angio 3/29 negative for PE  - LE duplex 4/1: bilateral soleal vein thrombosis, LE duplex 4/6: R gastrocnemius DVT   - s/p RDV, course of Levaquin (3/12-3/19)  - was on dexamethasone (3/11-3/31), stopped then restarted at 6mg 4/6-4/11, tapering now to 4mg (4/11- )  - C/w therapeutic Lovenox 100mg BID  - febrile 4/12 to 101F, f/u blood culture 4/12, urine cx 4/12, UA neg, and procalc    #HTN  #Sinus tachycardia  - C/w Metoprolol 25 mg BID    #DMII  HbA1c 6.9%  - FS well controlled  - C/w Lantus 20 U  -Once downgraded, endocrine consult for outpt tx recommendations    DVT ppx: therapeutic lovenox  GI ppx:  pantoprazole 40mg qD  Labs: CMP+Mg, CBC  Diet: Consistent Carb  PT/Rehab: Evaluation pending  Activity: Ambulate as tolerated  Dispo:  pending clinical improvement  FULL CODE 57 yo F with PMHx of HTN, T2DM admitted for acute hypoxemic respiratory failure due to COVID PNA. Downgrade from CEU.     #Acute hypoxemic respiratory failure secondary to COVID-19 pneumonia  - Patient is currently on O2 NC 5L > wean as tolerated   - Chest X-ray 4/8 - stable bilateral opacities  - inflammatory markers:  --d-dimer 2567 3/28  --procalc 0.03 4/7  --ferritin 691 4/7  --CRP 47 4/7  - LE Duplex negative 3/23, CT Angio 3/29 negative for PE  - LE duplex 4/1: bilateral soleal vein thrombosis, LE duplex 4/6: R gastrocnemius DVT   - s/p RDV, course of Levaquin (3/12-3/19)  - was on dexamethasone (3/11-3/31), stopped then restarted at 6mg 4/6-4/11, tapering now to 4mg (4/11-4/13), taper to 2mg (4/14- )  - C/w therapeutic Lovenox 100mg BID  - febrile 4/12 to 101F, leukocytosis improving, f/u blood culture 4/12, urine cx 4/12, UA neg, and procalc - hold off abx, afebrile since    #HTN  #Sinus tachycardia  - C/w Metoprolol 25 mg BID    #DMII  HbA1c 6.9%  - FS well controlled  - C/w Lantus 20 U  -Once downgraded, endocrine consult for outpt tx recommendations    DVT ppx: therapeutic lovenox  GI ppx:  pantoprazole 40mg qD  Labs: CMP+Mg, CBC  Diet: Consistent Carb  PT/Rehab: Evaluation pending  Activity: Ambulate as tolerated  Dispo:  pending clinical improvement  FULL CODE 57 yo F with PMHx of HTN, T2DM admitted for acute hypoxemic respiratory failure due to COVID PNA. Downgrade from CEU.     #Acute hypoxemic respiratory failure secondary to COVID-19 pneumonia  - Patient is currently on O2 NC 5L > wean as tolerated   - Chest X-ray 4/8 - stable bilateral opacities  - inflammatory markers:  --d-dimer 2567 3/28  --procalc 0.03 4/7  --ferritin 691 4/7  --CRP 47 4/7  - LE Duplex negative 3/23, CT Angio 3/29 negative for PE  - LE duplex 4/1: bilateral soleal vein thrombosis, LE duplex 4/6: R gastrocnemius DVT   - s/p RDV, course of Levaquin (3/12-3/19)  - was on dexamethasone (3/11-3/31), stopped then restarted at 6mg 4/6-4/11, tapering now to 4mg (4/11-4/13), taper to 2mg (4/14- )  - C/w therapeutic Lovenox 100mg BID  - febrile 4/12 to 101F, leukocytosis improving, f/u blood culture 4/12, urine cx 4/12, UA neg, and procalc - hold off abx, afebrile since    #HTN  #Sinus tachycardia  - C/w Metoprolol 25 mg BID    #DMII  HbA1c 6.9%  - FS well controlled  - C/w Lantus 8U qHS  -Once downgraded, endocrine consult for outpt tx recommendations    DVT ppx: therapeutic lovenox  GI ppx:  pantoprazole 40mg qD  Labs: CMP+Mg, CBC  Diet: Consistent Carb  PT/Rehab: Evaluation pending  Activity: Ambulate as tolerated  Dispo:  pending clinical improvement  FULL CODE

## 2021-04-13 NOTE — CHART NOTE - NSCHARTNOTEFT_GEN_A_CORE
I made rounds today with the treatment team including the hospitalist, residents,  nurses, and discussed the patient's current medical status and discharge  planning needs, and reviewed the chart.    T(C): 35.6 (04-13-21 @ 06:22), Max: 37.1 (04-12-21 @ 12:23)  HR: 91 (04-13-21 @ 06:22) (82 - 92)  BP: 136/63 (04-13-21 @ 06:22) (117/55 - 136/63)  RR: 18 (04-13-21 @ 06:22) (18 - 20)  SpO2: 91% (04-13-21 @ 06:22) (90% - 97%)          I reached out to the patient's health care proxy/ responsible family member-           [  x   ]  I reached  Beck Jack                                   and discussed the patient's medical condition,                   family concerns, and discharge planning           [     ]  I left a message with family               [     ]  I personally participated in rounds with the medical team and my resident and discussed the case. My resident reached                   family member/ HCP                                under my direction and supervision  and we reviewed the conversation.          [     ]  My resident left a message with family under my direction and supervision           [     ]   My resident attended medical rounds and called the family                [  x    ]    The following was discussed:  The patient's medical status over the past 24 hrs was reviewed, as well as oxygen needs and medication changes and labs. Stable on 5 liters NC O2. BOWEN and pulse ox drops with activity. Tmax 101 yesterday. Stands with PT with min assist. Plan to continue to mobilize and wean O2 as tolerated.         [   x   ]   The following concerns were raised: none          [     ] I spent 5-10 minutes on the above discussing medical issues with team members and family and/ or my resident    [  x   ] I spent 11-20 minutes on the above discussing medical issues with team members and family and/ or my resident    [     ] I spent 21-30 minutes on the above discussing medical issues with team members and family and/ or my resident

## 2021-04-13 NOTE — PROGRESS NOTE ADULT - SUBJECTIVE AND OBJECTIVE BOX
SUBJECTIVE:    Patient is a 56y old Female who presents with a chief complaint of Shortness of breath (2021 14:28)    Currently admitted to medicine with the primary diagnosis of Acute respiratory failure with hypoxia    Today is hospital day 34d. This morning she is resting comfortably in bed and reports no new issues or overnight events. Afebrile since 101F fever yesterday morning.  This AM, no fevers, chills. Denies CP or SOB. Has been eating. Last BM yesterday. On 5L NC.       PAST MEDICAL & SURGICAL HISTORY    SOCIAL HISTORY:  Negative for smoking/alcohol/drug use.     ALLERGIES:  Allergy Status Unknown    MEDICATIONS:  STANDING MEDICATIONS  chlorhexidine 4% Liquid 1 Application(s) Topical daily  dexAMETHasone  Injectable 4 milliGRAM(s) IV Push daily  dextrose 40% Gel 15 Gram(s) Oral once  dextrose 5%. 1000 milliLiter(s) IV Continuous <Continuous>  enoxaparin Injectable 100 milliGRAM(s) SubCutaneous two times a day  insulin glargine Injectable (LANTUS) 8 Unit(s) SubCutaneous at bedtime  insulin lispro (ADMELOG) corrective regimen sliding scale   SubCutaneous three times a day before meals  metoprolol tartrate 25 milliGRAM(s) Oral two times a day  multivitamin 1 Tablet(s) Oral daily  NIFEdipine XL 30 milliGRAM(s) Oral daily  pantoprazole    Tablet 40 milliGRAM(s) Oral before breakfast    PRN MEDICATIONS  acetaminophen   Tablet .. 650 milliGRAM(s) Oral every 6 hours PRN  guaifenesin/dextromethorphan  Syrup 10 milliLiter(s) Oral every 6 hours PRN    VITALS:   T(F): 96.1  HR: 91  BP: 136/63  RR: 18  SpO2: 91%    LABS:                        12.2   11.35 )-----------( 369      ( 2021 05:57 )             39.4     04-12    140  |  101  |  21<H>  ----------------------------<  122<H>  3.9   |  28  |  0.7    Ca    9.5      2021 05:57    TPro  6.3  /  Alb  3.6  /  TBili  0.2  /  DBili  x   /  AST  27  /  ALT  34  /  AlkPhos  65  04-12    Urinalysis Basic - ( 2021 10:59 )    Color: Yellow / Appearance: Clear / S.034 / pH: x  Gluc: x / Ketone: Negative  / Bili: Negative / Urobili: 3 mg/dL   Blood: x / Protein: 30 mg/dL / Nitrite: Negative   Leuk Esterase: Small / RBC: 2 /HPF / WBC 7 /HPF   Sq Epi: x / Non Sq Epi: 6 /HPF / Bacteria: Few    RADIOLOGY:  < from: Xray Chest 1 View- PORTABLE-Routine (Xray Chest 1 View- PORTABLE-Routine in AM.) (21 @ 07:03) >  IMPRESSION:  Stable diffuse bilateral opacities.  < end of copied text >    PHYSICAL EXAM:  GEN: NAD, lying in bed comfortably  HEENT: EOMI, PERRLA, conjunctiva and sclera clear. MMM.  LUNGS: Clear to auscultation bilaterally. No rales, rhonchi, or wheezing appreciated. On 5L NC.  HEART: S1/S2 present. RRR.   ABD: Soft, non-tender, non-distended. Bowel sounds present.  EXT: 2+ peripheral pulses. No clubbing, cyanosis, or edema.   NEURO: AAOX3. Speech clear. No focal neurological deficits. Sensation grossly intact.   Skin: No rashes or lesions.

## 2021-04-13 NOTE — PROGRESS NOTE ADULT - SUBJECTIVE AND OBJECTIVE BOX
PAULA ELMORE  56y Female    CHIEF COMPLAINT:    Patient is a 56y old  Female who presents with a chief complaint of Shortness of breath (13 Apr 2021 09:10)      INTERVAL HPI/OVERNIGHT EVENTS:    Patient seen and examined. No acute events overnight. No active complaints    ROS: All other systems are negative.    Vital Signs:    T(F): 96.1 (04-13-21 @ 06:22), Max: 98 (04-12-21 @ 18:24)  HR: 91 (04-13-21 @ 06:22) (87 - 92)  BP: 136/63 (04-13-21 @ 06:22) (117/55 - 136/63)  RR: 18 (04-13-21 @ 06:22) (18 - 20)  SpO2: 91% (04-13-21 @ 06:22) (90% - 95%)    POCT Blood Glucose.: 124 mg/dL (13 Apr 2021 11:21)  POCT Blood Glucose.: 114 mg/dL (13 Apr 2021 08:04)  POCT Blood Glucose.: 138 mg/dL (12 Apr 2021 21:33)  POCT Blood Glucose.: 129 mg/dL (12 Apr 2021 17:40)    PHYSICAL EXAM:    GENERAL:  NAD  SKIN: No rashes or lesions  HEENT: Atraumatic. Normocephalic.   NECK: Supple, No JVD.  PULMONARY: Coarse breath sounds. No wheezing. No rales  CVS: Normal S1, S2. Rate and Rhythm are regular.    ABDOMEN/GI: Soft, Nontender, Nondistended   MSK:  No edema B/L LE. No clubbing or cyanosis  NEUROLOGIC: moves all extremities  PSYCH: Alert & oriented x 3, normal affect    Consultant(s) Notes Reviewed:  [x ] YES  [ ] NO  Care Discussed with Consultants/Other Providers [ x] YES  [ ] NO    LABS:                        11.8   9.53  )-----------( 354      ( 13 Apr 2021 12:05 )             38.4     140  |  101  |  21<H>  ----------------------------<  122<H>  3.9   |  28  |  0.7    Ca    9.5      12 Apr 2021 05:57    TPro  6.3  /  Alb  3.6  /  TBili  0.2  /  DBili  x   /  AST  27  /  ALT  34  /  AlkPhos  65  04-12    RADIOLOGY & ADDITIONAL TESTS:  Imaging or report Personally Reviewed:  [x] YES  [ ] NO  EKG reviewed: [x] YES  [ ] NO    Medications:  Standing  chlorhexidine 4% Liquid 1 Application(s) Topical daily  dexAMETHasone  Injectable 4 milliGRAM(s) IV Push daily  dextrose 40% Gel 15 Gram(s) Oral once  dextrose 5%. 1000 milliLiter(s) IV Continuous <Continuous>  enoxaparin Injectable 100 milliGRAM(s) SubCutaneous two times a day  insulin glargine Injectable (LANTUS) 8 Unit(s) SubCutaneous at bedtime  insulin lispro (ADMELOG) corrective regimen sliding scale   SubCutaneous three times a day before meals  metoprolol tartrate 25 milliGRAM(s) Oral two times a day  multivitamin 1 Tablet(s) Oral daily  NIFEdipine XL 30 milliGRAM(s) Oral daily  pantoprazole    Tablet 40 milliGRAM(s) Oral before breakfast    PRN Meds  acetaminophen   Tablet .. 650 milliGRAM(s) Oral every 6 hours PRN  guaifenesin/dextromethorphan  Syrup 10 milliLiter(s) Oral every 6 hours PRN

## 2021-04-14 LAB
-  AMIKACIN: SIGNIFICANT CHANGE UP
-  AMIKACIN: SIGNIFICANT CHANGE UP
-  AMOXICILLIN/CLAVULANIC ACID: SIGNIFICANT CHANGE UP
-  AMOXICILLIN/CLAVULANIC ACID: SIGNIFICANT CHANGE UP
-  AMPICILLIN/SULBACTAM: SIGNIFICANT CHANGE UP
-  AMPICILLIN/SULBACTAM: SIGNIFICANT CHANGE UP
-  AMPICILLIN: SIGNIFICANT CHANGE UP
-  AMPICILLIN: SIGNIFICANT CHANGE UP
-  AZTREONAM: SIGNIFICANT CHANGE UP
-  AZTREONAM: SIGNIFICANT CHANGE UP
-  CEFAZOLIN: SIGNIFICANT CHANGE UP
-  CEFAZOLIN: SIGNIFICANT CHANGE UP
-  CEFEPIME: SIGNIFICANT CHANGE UP
-  CEFEPIME: SIGNIFICANT CHANGE UP
-  CEFOXITIN: SIGNIFICANT CHANGE UP
-  CEFOXITIN: SIGNIFICANT CHANGE UP
-  CEFTRIAXONE: SIGNIFICANT CHANGE UP
-  CEFTRIAXONE: SIGNIFICANT CHANGE UP
-  CIPROFLOXACIN: SIGNIFICANT CHANGE UP
-  CIPROFLOXACIN: SIGNIFICANT CHANGE UP
-  ERTAPENEM: SIGNIFICANT CHANGE UP
-  ERTAPENEM: SIGNIFICANT CHANGE UP
-  GENTAMICIN: SIGNIFICANT CHANGE UP
-  GENTAMICIN: SIGNIFICANT CHANGE UP
-  IMIPENEM: SIGNIFICANT CHANGE UP
-  IMIPENEM: SIGNIFICANT CHANGE UP
-  LEVOFLOXACIN: SIGNIFICANT CHANGE UP
-  LEVOFLOXACIN: SIGNIFICANT CHANGE UP
-  MEROPENEM: SIGNIFICANT CHANGE UP
-  MEROPENEM: SIGNIFICANT CHANGE UP
-  NITROFURANTOIN: SIGNIFICANT CHANGE UP
-  NITROFURANTOIN: SIGNIFICANT CHANGE UP
-  PIPERACILLIN/TAZOBACTAM: SIGNIFICANT CHANGE UP
-  PIPERACILLIN/TAZOBACTAM: SIGNIFICANT CHANGE UP
-  TIGECYCLINE: SIGNIFICANT CHANGE UP
-  TIGECYCLINE: SIGNIFICANT CHANGE UP
-  TOBRAMYCIN: SIGNIFICANT CHANGE UP
-  TOBRAMYCIN: SIGNIFICANT CHANGE UP
-  TRIMETHOPRIM/SULFAMETHOXAZOLE: SIGNIFICANT CHANGE UP
-  TRIMETHOPRIM/SULFAMETHOXAZOLE: SIGNIFICANT CHANGE UP
ALBUMIN SERPL ELPH-MCNC: 3.5 G/DL — SIGNIFICANT CHANGE UP (ref 3.5–5.2)
ALP SERPL-CCNC: 68 U/L — SIGNIFICANT CHANGE UP (ref 30–115)
ALT FLD-CCNC: 30 U/L — SIGNIFICANT CHANGE UP (ref 0–41)
ANION GAP SERPL CALC-SCNC: 11 MMOL/L — SIGNIFICANT CHANGE UP (ref 7–14)
AST SERPL-CCNC: 20 U/L — SIGNIFICANT CHANGE UP (ref 0–41)
BASOPHILS # BLD AUTO: 0.07 K/UL — SIGNIFICANT CHANGE UP (ref 0–0.2)
BASOPHILS NFR BLD AUTO: 0.7 % — SIGNIFICANT CHANGE UP (ref 0–1)
BILIRUB SERPL-MCNC: <0.2 MG/DL — SIGNIFICANT CHANGE UP (ref 0.2–1.2)
BUN SERPL-MCNC: 17 MG/DL — SIGNIFICANT CHANGE UP (ref 10–20)
CALCIUM SERPL-MCNC: 9.3 MG/DL — SIGNIFICANT CHANGE UP (ref 8.5–10.1)
CHLORIDE SERPL-SCNC: 101 MMOL/L — SIGNIFICANT CHANGE UP (ref 98–110)
CO2 SERPL-SCNC: 27 MMOL/L — SIGNIFICANT CHANGE UP (ref 17–32)
CREAT SERPL-MCNC: 0.6 MG/DL — LOW (ref 0.7–1.5)
CULTURE RESULTS: SIGNIFICANT CHANGE UP
EOSINOPHIL # BLD AUTO: 0.99 K/UL — HIGH (ref 0–0.7)
EOSINOPHIL NFR BLD AUTO: 10.1 % — HIGH (ref 0–8)
GLUCOSE BLDC GLUCOMTR-MCNC: 139 MG/DL — HIGH (ref 70–99)
GLUCOSE BLDC GLUCOMTR-MCNC: 158 MG/DL — HIGH (ref 70–99)
GLUCOSE BLDC GLUCOMTR-MCNC: 194 MG/DL — HIGH (ref 70–99)
GLUCOSE BLDC GLUCOMTR-MCNC: 234 MG/DL — HIGH (ref 70–99)
GLUCOSE SERPL-MCNC: 121 MG/DL — HIGH (ref 70–99)
HCT VFR BLD CALC: 38.3 % — SIGNIFICANT CHANGE UP (ref 37–47)
HGB BLD-MCNC: 11.7 G/DL — LOW (ref 12–16)
IMM GRANULOCYTES NFR BLD AUTO: 3.8 % — HIGH (ref 0.1–0.3)
LYMPHOCYTES # BLD AUTO: 2.02 K/UL — SIGNIFICANT CHANGE UP (ref 1.2–3.4)
LYMPHOCYTES # BLD AUTO: 20.6 % — SIGNIFICANT CHANGE UP (ref 20.5–51.1)
MAGNESIUM SERPL-MCNC: 2.1 MG/DL — SIGNIFICANT CHANGE UP (ref 1.8–2.4)
MCHC RBC-ENTMCNC: 27.8 PG — SIGNIFICANT CHANGE UP (ref 27–31)
MCHC RBC-ENTMCNC: 30.5 G/DL — LOW (ref 32–37)
MCV RBC AUTO: 91 FL — SIGNIFICANT CHANGE UP (ref 81–99)
METHOD TYPE: SIGNIFICANT CHANGE UP
METHOD TYPE: SIGNIFICANT CHANGE UP
MONOCYTES # BLD AUTO: 0.74 K/UL — HIGH (ref 0.1–0.6)
MONOCYTES NFR BLD AUTO: 7.6 % — SIGNIFICANT CHANGE UP (ref 1.7–9.3)
NEUTROPHILS # BLD AUTO: 5.6 K/UL — SIGNIFICANT CHANGE UP (ref 1.4–6.5)
NEUTROPHILS NFR BLD AUTO: 57.2 % — SIGNIFICANT CHANGE UP (ref 42.2–75.2)
NRBC # BLD: 0 /100 WBCS — SIGNIFICANT CHANGE UP (ref 0–0)
ORGANISM # SPEC MICROSCOPIC CNT: SIGNIFICANT CHANGE UP
PLATELET # BLD AUTO: 362 K/UL — SIGNIFICANT CHANGE UP (ref 130–400)
POTASSIUM SERPL-MCNC: 3 MMOL/L — LOW (ref 3.5–5)
POTASSIUM SERPL-SCNC: 3 MMOL/L — LOW (ref 3.5–5)
PROCALCITONIN SERPL-MCNC: 0.04 NG/ML — SIGNIFICANT CHANGE UP (ref 0.02–0.1)
PROT SERPL-MCNC: 6 G/DL — SIGNIFICANT CHANGE UP (ref 6–8)
RBC # BLD: 4.21 M/UL — SIGNIFICANT CHANGE UP (ref 4.2–5.4)
RBC # FLD: 13 % — SIGNIFICANT CHANGE UP (ref 11.5–14.5)
SODIUM SERPL-SCNC: 139 MMOL/L — SIGNIFICANT CHANGE UP (ref 135–146)
SPECIMEN SOURCE: SIGNIFICANT CHANGE UP
WBC # BLD: 9.79 K/UL — SIGNIFICANT CHANGE UP (ref 4.8–10.8)
WBC # FLD AUTO: 9.79 K/UL — SIGNIFICANT CHANGE UP (ref 4.8–10.8)

## 2021-04-14 PROCEDURE — 99233 SBSQ HOSP IP/OBS HIGH 50: CPT

## 2021-04-14 PROCEDURE — 71045 X-RAY EXAM CHEST 1 VIEW: CPT | Mod: 26

## 2021-04-14 RX ORDER — DEXAMETHASONE 0.5 MG/5ML
2 ELIXIR ORAL DAILY
Refills: 0 | Status: COMPLETED | OUTPATIENT
Start: 2021-04-15 | End: 2021-04-16

## 2021-04-14 RX ORDER — APIXABAN 2.5 MG/1
5 TABLET, FILM COATED ORAL EVERY 12 HOURS
Refills: 0 | Status: DISCONTINUED | OUTPATIENT
Start: 2021-04-14 | End: 2021-04-16

## 2021-04-14 RX ORDER — POTASSIUM CHLORIDE 20 MEQ
40 PACKET (EA) ORAL EVERY 4 HOURS
Refills: 0 | Status: COMPLETED | OUTPATIENT
Start: 2021-04-14 | End: 2021-04-14

## 2021-04-14 RX ORDER — APIXABAN 2.5 MG/1
5 TABLET, FILM COATED ORAL
Qty: 300 | Refills: 0
Start: 2021-04-14 | End: 2021-05-13

## 2021-04-14 RX ORDER — APIXABAN 2.5 MG/1
1 TABLET, FILM COATED ORAL
Qty: 60 | Refills: 0
Start: 2021-04-14 | End: 2021-05-13

## 2021-04-14 RX ADMIN — APIXABAN 5 MILLIGRAM(S): 2.5 TABLET, FILM COATED ORAL at 17:44

## 2021-04-14 RX ADMIN — Medication 40 MILLIEQUIVALENT(S): at 10:17

## 2021-04-14 RX ADMIN — ENOXAPARIN SODIUM 100 MILLIGRAM(S): 100 INJECTION SUBCUTANEOUS at 05:45

## 2021-04-14 RX ADMIN — CHLORHEXIDINE GLUCONATE 1 APPLICATION(S): 213 SOLUTION TOPICAL at 05:45

## 2021-04-14 RX ADMIN — Medication 2: at 11:33

## 2021-04-14 RX ADMIN — Medication 4 MILLIGRAM(S): at 05:44

## 2021-04-14 RX ADMIN — Medication 30 MILLIGRAM(S): at 05:44

## 2021-04-14 RX ADMIN — Medication 25 MILLIGRAM(S): at 17:44

## 2021-04-14 RX ADMIN — INSULIN GLARGINE 8 UNIT(S): 100 INJECTION, SOLUTION SUBCUTANEOUS at 21:36

## 2021-04-14 RX ADMIN — Medication 25 MILLIGRAM(S): at 05:44

## 2021-04-14 RX ADMIN — Medication 1 TABLET(S): at 11:10

## 2021-04-14 RX ADMIN — Medication 10 MILLILITER(S): at 21:59

## 2021-04-14 RX ADMIN — Medication 1: at 16:41

## 2021-04-14 RX ADMIN — PANTOPRAZOLE SODIUM 40 MILLIGRAM(S): 20 TABLET, DELAYED RELEASE ORAL at 05:44

## 2021-04-14 RX ADMIN — Medication 40 MILLIEQUIVALENT(S): at 13:03

## 2021-04-14 NOTE — CONSULT NOTE ADULT - SUBJECTIVE AND OBJECTIVE BOX
PAULA ELMORE  56y, Female  Allergy: Allergy Status Unknown      All historical available data reviewed.    HPI:  56-year-old, female patient, PMHx: Hypertension, presented to the ED for Shortness of breath. Jacqueline goes back to 2 days prior to presentation when the patient started having dyspnea. New-onset, aggravated by exertion, no alleviating factors. No orthopnea. The patient had an associated cough, productive of whitish sputum. She denies any fever ( but was febrile in the ED). No sore throat, no anosmia no loss of taste. No skin contact, no recent travel.      (11 Mar 2021 02:25)    FAMILY HISTORY:    PAST MEDICAL & SURGICAL HISTORY:        VITALS:  T(F): 98.8, Max: 103.3 (03-10-21 @ 23:35)  HR: 116  BP: 140/63  RR: 22Vital Signs Last 24 Hrs  T(C): 37.1 (11 Mar 2021 01:31), Max: 39.6 (10 Mar 2021 23:35)  T(F): 98.8 (11 Mar 2021 01:31), Max: 103.3 (10 Mar 2021 23:35)  HR: 116 (11 Mar 2021 00:19) (116 - 116)  BP: 140/63 (10 Mar 2021 22:13) (140/63 - 140/63)  BP(mean): --  RR: 22 (11 Mar 2021 00:19) (22 - 28)  SpO2: 94% (11 Mar 2021 00:19) (78% - 94%)    TESTS & MEASUREMENTS:                        11.6   7.65  )-----------( 253      ( 10 Mar 2021 22:24 )             36.1     03-10    138  |  102  |  18  ----------------------------<  276<H>  4.0   |  19  |  1.1    Ca    8.7      10 Mar 2021 22:24    TPro  7.3  /  Alb  3.9  /  TBili  <0.2  /  DBili  x   /  AST  77<H>  /  ALT  31  /  AlkPhos  81  03-10    LIVER FUNCTIONS - ( 10 Mar 2021 22:24 )  Alb: 3.9 g/dL / Pro: 7.3 g/dL / ALK PHOS: 81 U/L / ALT: 31 U/L / AST: 77 U/L / GGT: x             Urinalysis Basic - ( 11 Mar 2021 04:30 )    Color: Yellow / Appearance: Clear / S.048 / pH: x  Gluc: x / Ketone: Trace  / Bili: Negative / Urobili: <2 mg/dL   Blood: x / Protein: >600 mg/dL / Nitrite: Negative   Leuk Esterase: Negative / RBC: 1 /HPF / WBC 6 /HPF   Sq Epi: x / Non Sq Epi: 13 /HPF / Bacteria: Negative          RADIOLOGY & ADDITIONAL TESTS:  Personal review of radiological diagnostics performed  Echo and EKG results noted when applicable.     MEDICATIONS:  acetaminophen   Tablet .. 650 milliGRAM(s) Oral every 6 hours PRN  chlorhexidine 4% Liquid 1 Application(s) Topical daily  dexAMETHasone  Injectable 6 milliGRAM(s) IV Push daily  enoxaparin Injectable 40 milliGRAM(s) SubCutaneous two times a day  guaifenesin/dextromethorphan  Syrup 10 milliLiter(s) Oral every 6 hours PRN  pantoprazole    Tablet 40 milliGRAM(s) Oral before breakfast  remdesivir  IVPB   IV Intermittent   sodium chloride 0.9%. 1000 milliLiter(s) IV Continuous <Continuous>      ANTIBIOTICS:  remdesivir  IVPB   IV Intermittent     
PAULA SUMO 56y Female  MRN#: [id number]       SUBJECTIVE  56 year old female with PMH of HTN, admitted for acute hypoxemic respiratory failure due to COVID PNA. Endocrine consulted for newly diagnosed diabetes.     OBJECTIVE  PAST MEDICAL & SURGICAL HISTORY    ALLERGIES:  Allergy Status Unknown    MEDICATIONS:  STANDING MEDICATIONS  apixaban 5 milliGRAM(s) Oral every 12 hours  chlorhexidine 4% Liquid 1 Application(s) Topical daily  dextrose 40% Gel 15 Gram(s) Oral once  dextrose 5%. 1000 milliLiter(s) IV Continuous <Continuous>  insulin glargine Injectable (LANTUS) 8 Unit(s) SubCutaneous at bedtime  insulin lispro (ADMELOG) corrective regimen sliding scale   SubCutaneous three times a day before meals  metoprolol tartrate 25 milliGRAM(s) Oral two times a day  multivitamin 1 Tablet(s) Oral daily  NIFEdipine XL 30 milliGRAM(s) Oral daily  pantoprazole    Tablet 40 milliGRAM(s) Oral before breakfast    PRN MEDICATIONS  acetaminophen   Tablet .. 650 milliGRAM(s) Oral every 6 hours PRN  guaifenesin/dextromethorphan  Syrup 10 milliLiter(s) Oral every 6 hours PRN      VITAL SIGNS: Last 24 Hours  T(C): 36 (14 Apr 2021 06:04), Max: 36.7 (13 Apr 2021 21:22)  T(F): 96.8 (14 Apr 2021 06:04), Max: 98 (13 Apr 2021 21:22)  HR: 104 (14 Apr 2021 06:04) (84 - 104)  BP: 123/57 (14 Apr 2021 06:04) (108/51 - 123/57)  BP(mean): --  RR: 18 (14 Apr 2021 06:04) (18 - 18)  SpO2: 93% (14 Apr 2021 06:04) (90% - 94%)    LABS:                        11.7   9.79  )-----------( 362      ( 14 Apr 2021 05:57 )             38.3     04-14    139  |  101  |  17  ----------------------------<  121<H>  3.0<L>   |  27  |  0.6<L>    Ca    9.3      14 Apr 2021 05:57  Mg     2.1     04-14    TPro  6.0  /  Alb  3.5  /  TBili  <0.2  /  DBili  x   /  AST  20  /  ALT  30  /  AlkPhos  68  04-14              Culture - Blood (collected 12 Apr 2021 18:21)  Source: .Blood Blood  Preliminary Report (13 Apr 2021 22:01):    No growth to date.    Culture - Urine (collected 12 Apr 2021 10:59)  Source: .Urine Clean Catch (Midstream)  Preliminary Report (13 Apr 2021 18:07):    >100,000 CFU/ml Escherichia coli      PHYSICAL EXAM:  GENERAL: NAD, well-developed, AAOx3  HEENT:  Atraumatic, Normocephalic.  PULMONARY: Clear to auscultation bilaterally  CARDIOVASCULAR: Regular rate and rhythm  GASTROINTESTINAL: Soft, Nontender    
Patient is a 56y old  Female who presents with a chief complaint of Shortness of breath (11 Mar 2021 05:48)      HPI:  56-year-old, female patient, PMHx: Hypertension, presented to the ED for Shortness of breath. Jacqueline goes back to 2 days prior to presentation when the patient started having dyspnea. New-onset, aggravated by exertion, no alleviating factors. No orthopnea. The patient had an associated cough, productive of whitish sputum. She denies any fever ( but was febrile in the ED). No sore throat, no anosmia no loss of taste. No skin contact, no recent travel. In ED, was hypoxemic placed on H[facility]NC called to evaluate           PAST MEDICAL & SURGICAL HISTORY: HTN      SOCIAL HX:   Smoking  -      REVIEW OF SYSTEMS NEG see hpi      Allergies    Allergy Status Unknown    Intolerances        acetaminophen   Tablet .. 650 milliGRAM(s) Oral every 6 hours PRN  chlorhexidine 4% Liquid 1 Application(s) Topical daily  dexAMETHasone  Injectable 6 milliGRAM(s) IV Push daily  enoxaparin Injectable 40 milliGRAM(s) SubCutaneous two times a day  guaifenesin/dextromethorphan  Syrup 10 milliLiter(s) Oral every 6 hours PRN  pantoprazole    Tablet 40 milliGRAM(s) Oral before breakfast  remdesivir  IVPB   IV Intermittent   sodium chloride 0.9%. 1000 milliLiter(s) IV Continuous <Continuous>  : Home Meds:      PHYSICAL EXAM    ICU Vital Signs Last 24 Hrs  T(C): 37.1 (11 Mar 2021 01:31), Max: 39.6 (10 Mar 2021 23:35)  T(F): 98.8 (11 Mar 2021 01:31), Max: 103.3 (10 Mar 2021 23:35)  HR: 97 (11 Mar 2021 06:08) (97 - 116)  BP: 127/61 (11 Mar 2021 06:08) (127/61 - 140/63)  RR: 22 (11 Mar 2021 06:08) (22 - 28)  SpO2: 90% (11 Mar 2021 06:08) (78% - 94%)      General: ill looking  HEENT:  TRISHA              Lymph Nodes: No cervical LN   Lungs: Bilateral rhonchi  Cardiovascular: Regular  Abdomen: Soft, Positive BS  Extremities: No clubbing  Skin: Warm  Neurological: Non focal         LABS:                          11.6   7.65  )-----------( 253      ( 10 Mar 2021 22:24 )             36.1                                               03-10    138  |  102  |  18  ----------------------------<  276<H>  4.0   |  19  |  1.1    Ca    8.7      10 Mar 2021 22:24    TPro  7.3  /  Alb  3.9  /  TBili  <0.2  /  DBili  x   /  AST  77<H>  /  ALT  31  /  AlkPhos  81  03-10                                             Urinalysis Basic - ( 11 Mar 2021 04:30 )    Color: Yellow / Appearance: Clear / S.048 / pH: x  Gluc: x / Ketone: Trace  / Bili: Negative / Urobili: <2 mg/dL   Blood: x / Protein: >600 mg/dL / Nitrite: Negative   Leuk Esterase: Negative / RBC: 1 /HPF / WBC 6 /HPF   Sq Epi: x / Non Sq Epi: 13 /HPF / Bacteria: Negative        CARDIAC MARKERS ( 10 Mar 2021 22:24 )  x     / 0.02 ng/mL / x     / x     / x                                                LIVER FUNCTIONS - ( 10 Mar 2021 22:24 )  Alb: 3.9 g/dL / Pro: 7.3 g/dL / ALK PHOS: 81 U/L / ALT: 31 U/L / AST: 77 U/L / GGT: x                                                                                                                                       X-Rays reviewed    MEDICATIONS  (STANDING):  chlorhexidine 4% Liquid 1 Application(s) Topical daily  dexAMETHasone  Injectable 6 milliGRAM(s) IV Push daily  enoxaparin Injectable 40 milliGRAM(s) SubCutaneous two times a day  pantoprazole    Tablet 40 milliGRAM(s) Oral before breakfast  remdesivir  IVPB   IV Intermittent   sodium chloride 0.9%. 1000 milliLiter(s) (75 mL/Hr) IV Continuous <Continuous>    MEDICATIONS  (PRN):  acetaminophen   Tablet .. 650 milliGRAM(s) Oral every 6 hours PRN Mild Pain (1 - 3)  guaifenesin/dextromethorphan  Syrup 10 milliLiter(s) Oral every 6 hours PRN Cough

## 2021-04-14 NOTE — CHART NOTE - NSCHARTNOTEFT_GEN_A_CORE
I made rounds today with the treatment team including the hospitalist, residents,  nurses, and discussed the patient's current medical status and discharge  planning needs, and reviewed the chart.    T(C): 36 (04-14-21 @ 06:04), Max: 36.7 (04-13-21 @ 21:22)  HR: 104 (04-14-21 @ 06:04) (84 - 104)  BP: 123/57 (04-14-21 @ 06:04) (108/51 - 123/57)  RR: 18 (04-14-21 @ 06:04) (18 - 18)  SpO2: 93% (04-14-21 @ 06:04) (90% - 94%)          I reached out to the patient's health care proxy/ responsible family member-           [     ]  I reached                                     and discussed the patient's medical condition,                   family concerns, and discharge planning           [     ]  I left a message with family               [   x  ]  I personally participated in rounds with the medical team and my resident and discussed the case. My resident reached                   family member/ HCP   Beck Jack                             under my direction and supervision  and we reviewed the conversation.          [     ]  My resident left a message with family under my direction and supervision           [     ]   My resident attended medical rounds and called the family                [   x   ]    The following was discussed:  The patient's medical status over the past 24 hrs was reviewed, as well as oxygen needs and medication changes and labs. Changing Lovenox to Eliquis for DVT. On 3 liters O2. WBC decreasing. Inflammatory markers improving. Plan to wean O2 and eval for possible home O2.          [   x   ]   The following concerns were raised: none          [     ] I spent 5-10 minutes on the above discussing medical issues with team members and family and/ or my resident    [   x  ] I spent 11-20 minutes on the above discussing medical issues with team members and family and/ or my resident    [     ] I spent 21-30 minutes on the above discussing medical issues with team members and family and/ or my resident

## 2021-04-14 NOTE — PROGRESS NOTE ADULT - SUBJECTIVE AND OBJECTIVE BOX
SUBJECTIVE:    Patient is a 56y old Female who presents with a chief complaint of Shortness of breath (13 Apr 2021 12:36)    Currently admitted to medicine with the primary diagnosis of Acute respiratory failure with hypoxia       Today is hospital day 35d. This morning she is resting comfortably in bed and reports no new issues or overnight events. Afebrile. This AM, no fevers, chills. Denies CP or SOB. Has been eating. Last BM yesterday. On 3L NC.     PAST MEDICAL & SURGICAL HISTORY    SOCIAL HISTORY:  Negative for smoking/alcohol/drug use.     ALLERGIES:  Allergy Status Unknown    MEDICATIONS:  STANDING MEDICATIONS  apixaban 5 milliGRAM(s) Oral every 12 hours  chlorhexidine 4% Liquid 1 Application(s) Topical daily  dexAMETHasone  Injectable 4 milliGRAM(s) IV Push daily  dextrose 40% Gel 15 Gram(s) Oral once  dextrose 5%. 1000 milliLiter(s) IV Continuous <Continuous>  insulin glargine Injectable (LANTUS) 8 Unit(s) SubCutaneous at bedtime  insulin lispro (ADMELOG) corrective regimen sliding scale   SubCutaneous three times a day before meals  metoprolol tartrate 25 milliGRAM(s) Oral two times a day  multivitamin 1 Tablet(s) Oral daily  NIFEdipine XL 30 milliGRAM(s) Oral daily  pantoprazole    Tablet 40 milliGRAM(s) Oral before breakfast  potassium chloride   Powder 40 milliEquivalent(s) Oral every 4 hours    PRN MEDICATIONS  acetaminophen   Tablet .. 650 milliGRAM(s) Oral every 6 hours PRN  guaifenesin/dextromethorphan  Syrup 10 milliLiter(s) Oral every 6 hours PRN    VITALS:   T(F): 96.8  HR: 104  BP: 123/57  RR: 18  SpO2: 93%    LABS:                        11.7   9.79  )-----------( 362      ( 14 Apr 2021 05:57 )             38.3     04-14    139  |  101  |  17  ----------------------------<  121<H>  3.0<L>   |  27  |  0.6<L>    Ca    9.3      14 Apr 2021 05:57  Mg     2.1     04-14    TPro  6.0  /  Alb  3.5  /  TBili  <0.2  /  DBili  x   /  AST  20  /  ALT  30  /  AlkPhos  68  04-14    Culture - Blood (collected 12 Apr 2021 18:21)  Source: .Blood Blood  Preliminary Report (13 Apr 2021 22:01):    No growth to date.    Culture - Urine (collected 12 Apr 2021 10:59)  Source: .Urine Clean Catch (Midstream)  Preliminary Report (13 Apr 2021 18:07):    >100,000 CFU/ml Escherichia coli      RADIOLOGY:  < from: Xray Chest 1 View- PORTABLE-Urgent (Xray Chest 1 View- PORTABLE-Urgent .) (04.14.21 @ 10:29) >  Impression:  Bilateral opacifications, low lung volumes.  Worsening.  < end of copied text >    PHYSICAL EXAM:  GEN: NAD, lying in bed comfortably  HEENT: EOMI, PERRLA, conjunctiva and sclera clear. MMM.  LUNGS: Clear to auscultation bilaterally. No rales, rhonchi, or wheezing appreciated. On 3L NC.  HEART: S1/S2 present. RRR.   ABD: Soft, non-tender, non-distended. Bowel sounds present.  EXT: 2+ peripheral pulses. No clubbing, cyanosis, or edema.   NEURO: AAOX3. Speech clear. No focal neurological deficits. Sensation grossly intact.   Skin: No rashes or lesions.

## 2021-04-14 NOTE — PROGRESS NOTE ADULT - ASSESSMENT
55 yo F with PMHx of HTN, T2DM admitted for acute hypoxemic respiratory failure due to COVID PNA. Downgrade from CEU.     #Acute hypoxemic respiratory failure secondary to COVID-19 pneumonia  - Patient is currently on O2 NC 3L > wean as tolerated   - Chest X-ray 4/14: bilateral opacifications, worsening compared to 4/8  - inflammatory markers:   --d-dimer 2567 3/28   --procalc 0.03 4/7  --ferritin 691 4/7  --CRP 47 4/7  - LE Duplex negative 3/23, CT Angio 3/29 negative for PE  - LE duplex 4/1: bilateral soleal vein thrombosis, LE duplex 4/6: R gastrocnemius DVT   - s/p RDV, course of Levaquin (3/12-3/19)  - was on dexamethasone (3/11-3/31), stopped then restarted at 6mg 4/6-4/11, tapering now to 4mg (4/11-4/14), taper to 2mg (4/15- )  - s/p therapeutic Lovenox 100mg BID > switch to PO eliquis 5mg BID  - febrile 4/12 to 101F, leukocytosis improving, f/u blood culture 4/12: NGTD, urine cx 4/12: >100k E. coli, UA small LEs, and procalc - hold off abx, afebrile since    #asymptomatic bacteruria  UCx 4/12: 100k E. coli. UA small LEs.  -afebrile since 4/12, hold off abx     #HTN  #Sinus tachycardia  - C/w Metoprolol 25 mg BID    #DMII  HbA1c 6.9%  - FS well controlled  - C/w Lantus 8U qHS  -Once downgraded, endocrine consult for outpt tx recommendations    DVT ppx: started on eliquis  GI ppx:  pantoprazole 40mg qD  Labs: CMP+Mg, CBC  Diet: Consistent Carb  PT/Rehab: STR vs home with PT  Activity: Ambulate as tolerated  Dispo:  STR vs home with services pending clinical improvement  FULL CODE

## 2021-04-14 NOTE — CONSULT NOTE ADULT - ASSESSMENT
# DM type 2  -A1c 6.8, currently on 8 units and sliding scale.   -FS well controlled.   -May get lipid profile to complete the workup for ASCVD risk stratification  -Can be started on metformin 500 bid. Would need scripts for diabetic supplies including glucometer and lancets.   -Can follow up outpatient in the office.  # DM type 2  -A1c 6.8, currently on 8 units and sliding scale.   -FS well controlled.   -May get lipid profile to complete the workup for ASCVD risk stratification  -Can be started on metformin 850 mg. bid. Would need scripts for diabetic supplies including glucometer and lancets.   -Can follow up outpatient in the office.

## 2021-04-15 ENCOUNTER — TRANSCRIPTION ENCOUNTER (OUTPATIENT)
Age: 56
End: 2021-04-15

## 2021-04-15 LAB
ALBUMIN SERPL ELPH-MCNC: 3.5 G/DL — SIGNIFICANT CHANGE UP (ref 3.5–5.2)
ALP SERPL-CCNC: 65 U/L — SIGNIFICANT CHANGE UP (ref 30–115)
ALT FLD-CCNC: 34 U/L — SIGNIFICANT CHANGE UP (ref 0–41)
ANION GAP SERPL CALC-SCNC: 11 MMOL/L — SIGNIFICANT CHANGE UP (ref 7–14)
AST SERPL-CCNC: 24 U/L — SIGNIFICANT CHANGE UP (ref 0–41)
BASOPHILS # BLD AUTO: 0.06 K/UL — SIGNIFICANT CHANGE UP (ref 0–0.2)
BASOPHILS NFR BLD AUTO: 0.6 % — SIGNIFICANT CHANGE UP (ref 0–1)
BILIRUB SERPL-MCNC: <0.2 MG/DL — SIGNIFICANT CHANGE UP (ref 0.2–1.2)
BUN SERPL-MCNC: 16 MG/DL — SIGNIFICANT CHANGE UP (ref 10–20)
CALCIUM SERPL-MCNC: 9.3 MG/DL — SIGNIFICANT CHANGE UP (ref 8.5–10.1)
CHLORIDE SERPL-SCNC: 102 MMOL/L — SIGNIFICANT CHANGE UP (ref 98–110)
CO2 SERPL-SCNC: 28 MMOL/L — SIGNIFICANT CHANGE UP (ref 17–32)
CREAT SERPL-MCNC: 0.7 MG/DL — SIGNIFICANT CHANGE UP (ref 0.7–1.5)
D DIMER BLD IA.RAPID-MCNC: 257 NG/ML DDU — HIGH (ref 0–230)
EOSINOPHIL # BLD AUTO: 0.63 K/UL — SIGNIFICANT CHANGE UP (ref 0–0.7)
EOSINOPHIL NFR BLD AUTO: 5.9 % — SIGNIFICANT CHANGE UP (ref 0–8)
GLUCOSE BLDC GLUCOMTR-MCNC: 107 MG/DL — HIGH (ref 70–99)
GLUCOSE BLDC GLUCOMTR-MCNC: 118 MG/DL — HIGH (ref 70–99)
GLUCOSE BLDC GLUCOMTR-MCNC: 122 MG/DL — HIGH (ref 70–99)
GLUCOSE BLDC GLUCOMTR-MCNC: 146 MG/DL — HIGH (ref 70–99)
GLUCOSE BLDC GLUCOMTR-MCNC: 205 MG/DL — HIGH (ref 70–99)
GLUCOSE SERPL-MCNC: 126 MG/DL — HIGH (ref 70–99)
HCT VFR BLD CALC: 38.2 % — SIGNIFICANT CHANGE UP (ref 37–47)
HGB BLD-MCNC: 11.5 G/DL — LOW (ref 12–16)
IMM GRANULOCYTES NFR BLD AUTO: 2.2 % — HIGH (ref 0.1–0.3)
LYMPHOCYTES # BLD AUTO: 1.8 K/UL — SIGNIFICANT CHANGE UP (ref 1.2–3.4)
LYMPHOCYTES # BLD AUTO: 16.7 % — LOW (ref 20.5–51.1)
MAGNESIUM SERPL-MCNC: 2.2 MG/DL — SIGNIFICANT CHANGE UP (ref 1.8–2.4)
MCHC RBC-ENTMCNC: 28 PG — SIGNIFICANT CHANGE UP (ref 27–31)
MCHC RBC-ENTMCNC: 30.1 G/DL — LOW (ref 32–37)
MCV RBC AUTO: 92.9 FL — SIGNIFICANT CHANGE UP (ref 81–99)
MONOCYTES # BLD AUTO: 0.66 K/UL — HIGH (ref 0.1–0.6)
MONOCYTES NFR BLD AUTO: 6.1 % — SIGNIFICANT CHANGE UP (ref 1.7–9.3)
NEUTROPHILS # BLD AUTO: 7.37 K/UL — HIGH (ref 1.4–6.5)
NEUTROPHILS NFR BLD AUTO: 68.5 % — SIGNIFICANT CHANGE UP (ref 42.2–75.2)
NRBC # BLD: 0 /100 WBCS — SIGNIFICANT CHANGE UP (ref 0–0)
PLATELET # BLD AUTO: 348 K/UL — SIGNIFICANT CHANGE UP (ref 130–400)
POTASSIUM SERPL-MCNC: 3.4 MMOL/L — LOW (ref 3.5–5)
POTASSIUM SERPL-SCNC: 3.4 MMOL/L — LOW (ref 3.5–5)
PROT SERPL-MCNC: 6.1 G/DL — SIGNIFICANT CHANGE UP (ref 6–8)
RBC # BLD: 4.11 M/UL — LOW (ref 4.2–5.4)
RBC # FLD: 13.1 % — SIGNIFICANT CHANGE UP (ref 11.5–14.5)
SODIUM SERPL-SCNC: 141 MMOL/L — SIGNIFICANT CHANGE UP (ref 135–146)
WBC # BLD: 10.76 K/UL — SIGNIFICANT CHANGE UP (ref 4.8–10.8)
WBC # FLD AUTO: 10.76 K/UL — SIGNIFICANT CHANGE UP (ref 4.8–10.8)

## 2021-04-15 PROCEDURE — 71045 X-RAY EXAM CHEST 1 VIEW: CPT | Mod: 26

## 2021-04-15 PROCEDURE — 99233 SBSQ HOSP IP/OBS HIGH 50: CPT

## 2021-04-15 RX ORDER — NIFEDIPINE 30 MG
1 TABLET, EXTENDED RELEASE 24 HR ORAL
Qty: 30 | Refills: 0
Start: 2021-04-15 | End: 2021-05-14

## 2021-04-15 RX ORDER — METFORMIN HYDROCHLORIDE 850 MG/1
1 TABLET ORAL
Qty: 60 | Refills: 0
Start: 2021-04-15 | End: 2021-05-14

## 2021-04-15 RX ORDER — METOPROLOL TARTRATE 50 MG
1 TABLET ORAL
Qty: 60 | Refills: 0
Start: 2021-04-15 | End: 2021-05-14

## 2021-04-15 RX ORDER — POTASSIUM CHLORIDE 20 MEQ
40 PACKET (EA) ORAL EVERY 4 HOURS
Refills: 0 | Status: COMPLETED | OUTPATIENT
Start: 2021-04-15 | End: 2021-04-15

## 2021-04-15 RX ADMIN — Medication 25 MILLIGRAM(S): at 17:19

## 2021-04-15 RX ADMIN — Medication 40 MILLIEQUIVALENT(S): at 12:03

## 2021-04-15 RX ADMIN — Medication 2 MILLIGRAM(S): at 06:16

## 2021-04-15 RX ADMIN — Medication 1 TABLET(S): at 12:03

## 2021-04-15 RX ADMIN — Medication 40 MILLIEQUIVALENT(S): at 14:05

## 2021-04-15 RX ADMIN — APIXABAN 5 MILLIGRAM(S): 2.5 TABLET, FILM COATED ORAL at 17:19

## 2021-04-15 RX ADMIN — Medication 2: at 12:04

## 2021-04-15 RX ADMIN — Medication 0: at 08:18

## 2021-04-15 RX ADMIN — PANTOPRAZOLE SODIUM 40 MILLIGRAM(S): 20 TABLET, DELAYED RELEASE ORAL at 06:17

## 2021-04-15 RX ADMIN — Medication 25 MILLIGRAM(S): at 06:17

## 2021-04-15 RX ADMIN — Medication 30 MILLIGRAM(S): at 06:17

## 2021-04-15 RX ADMIN — CHLORHEXIDINE GLUCONATE 1 APPLICATION(S): 213 SOLUTION TOPICAL at 06:17

## 2021-04-15 RX ADMIN — APIXABAN 5 MILLIGRAM(S): 2.5 TABLET, FILM COATED ORAL at 06:17

## 2021-04-15 RX ADMIN — INSULIN GLARGINE 8 UNIT(S): 100 INJECTION, SOLUTION SUBCUTANEOUS at 22:31

## 2021-04-15 NOTE — DISCHARGE NOTE PROVIDER - CARE PROVIDERS DIRECT ADDRESSES
[email or web address]

## 2021-04-15 NOTE — CHART NOTE - NSCHARTNOTEFT_GEN_A_CORE
Letter of Medical Necessity    Pt requires home oxygen due to COVID pneumonia. IV antibiotics and steroids have been tried but unsuccessful. Pt is otherwise in chronic stable state. Pt is aware and agreeable to go home with home oxygen. Pt will require concentrator and portable oxygen.   Patient's O2 saturation is 84% at rest on room air. Oxygen saturation during ambulation with oxygen with nasal cannula at 3L/min improves to 97%.

## 2021-04-15 NOTE — DISCHARGE NOTE PROVIDER - CARE PROVIDER_API CALL
Darby Power  INTERNAL MEDICINE  [address] [place] [address]  Phone: [phone]  Fax: [phone]  Follow Up Time: 2 weeks    Zhang Christina  CRITICAL CARE MEDICINE  [address] [place] [address]  Phone: [phone]  Fax: [phone]  Follow Up Time: 2 weeks    Ana Higgins)  Internal Medicine  [address] [facility], [address] [place] [address]  Phone: [phone]  Fax: [phone]  Scheduled Appointment: 04/30/2021 08:30 AM

## 2021-04-15 NOTE — PROGRESS NOTE ADULT - SUBJECTIVE AND OBJECTIVE BOX
SUBJECTIVE:    Patient is a 56y old Female who presents with a chief complaint of Shortness of breath (14 Apr 2021 13:36)    Currently admitted to medicine with the primary diagnosis of Acute respiratory failure with hypoxia     Today is hospital day 36d. This morning she is resting comfortably in bed and reports no new issues or overnight events. Afebrile. This AM, no fevers, chills. Denies CP or SOB. Has been eating. On 3L NC.       PAST MEDICAL & SURGICAL HISTORY    SOCIAL HISTORY:  Negative for smoking/alcohol/drug use.     ALLERGIES:  Allergy Status Unknown    MEDICATIONS:  STANDING MEDICATIONS  apixaban 5 milliGRAM(s) Oral every 12 hours  chlorhexidine 4% Liquid 1 Application(s) Topical daily  dexAMETHasone     Tablet 2 milliGRAM(s) Oral daily  dextrose 40% Gel 15 Gram(s) Oral once  dextrose 5%. 1000 milliLiter(s) IV Continuous <Continuous>  insulin glargine Injectable (LANTUS) 8 Unit(s) SubCutaneous at bedtime  insulin lispro (ADMELOG) corrective regimen sliding scale   SubCutaneous three times a day before meals  metoprolol tartrate 25 milliGRAM(s) Oral two times a day  multivitamin 1 Tablet(s) Oral daily  NIFEdipine XL 30 milliGRAM(s) Oral daily  pantoprazole    Tablet 40 milliGRAM(s) Oral before breakfast    PRN MEDICATIONS  acetaminophen   Tablet .. 650 milliGRAM(s) Oral every 6 hours PRN  guaifenesin/dextromethorphan  Syrup 10 milliLiter(s) Oral every 6 hours PRN    VITALS:   T(F): 98.2  HR: 80  BP: 131/61  RR: 18  SpO2: 84%    LABS:                        11.5   10.76 )-----------( 348      ( 15 Apr 2021 08:19 )             38.2     04-15    141  |  102  |  16  ----------------------------<  126<H>  3.4<L>   |  28  |  0.7    Ca    9.3      15 Apr 2021 08:19  Mg     2.2     04-15    TPro  6.1  /  Alb  3.5  /  TBili  <0.2  /  DBili  x   /  AST  24  /  ALT  34  /  AlkPhos  65  04-15  Culture - Blood (collected 12 Apr 2021 18:21)  Source: .Blood Blood  Preliminary Report (13 Apr 2021 22:01):    No growth to date.    RADIOLOGY:  < from: Xray Chest 1 View- PORTABLE-Urgent (Xray Chest 1 View- PORTABLE-Urgent .) (04.14.21 @ 10:29) >  Impression:  Bilateral opacifications, low lung volumes.  Worsening.  < end of copied text >    F/U CXR 4/15 official read    PHYSICAL EXAM:  GEN: NAD, lying in bed comfortably  HEENT: EOMI, PERRLA, conjunctiva and sclera clear. MMM.  LUNGS: Clear to auscultation bilaterally. No rales, rhonchi, or wheezing appreciated. On 3L NC.  HEART: S1/S2 present. RRR.   ABD: Soft, non-tender, non-distended. Bowel sounds present.  EXT: 2+ peripheral pulses. No clubbing, cyanosis, or edema.   NEURO: AAOX3. Speech clear. No focal neurological deficits. Sensation grossly intact.   Skin: No rashes or lesions.

## 2021-04-15 NOTE — DISCHARGE NOTE PROVIDER - NSDCCPCAREPLAN_GEN_ALL_CORE_FT
PRINCIPAL DISCHARGE DIAGNOSIS  Diagnosis: Acute respiratory failure with hypoxia  Assessment and Plan of Treatment: #Acute hypoxemic respiratory failure secondary to COVID-19 pneumonia  Patient is currently on O2 NC 3L. Chest X-ray 4/14: bilateral opacifications. Inflammatory markers: d-dimer 2567 3/28 > 257 4/15, procalc 0.03 4/7 > 0.04 4/13, ferritin 691 4/7, CRP 47 4/7 . LE Duplex negative 3/23, CT Angio 3/29 negative for PE. LE duplex 4/1: bilateral soleal vein thrombosis, LE duplex 4/6: R gastrocnemius DVT. therapeutic Lovenox 100mg BID > c/w PO eliquis 5mg BID. s/p RDV, course of Levaquin (3/12-3/19). Was on dexamethasone (3/11-3/31), stopped then restarted at 6mg 4/6-4/11, tapered to 4mg (4/11-4/14), now taper to 2mg (4/15-4/16).   - c/w eliquis PO 5mg BID  - use home oxygen at 3LPM as needed  - f/u PCP for post-COVID care, quarantine in hospital completed  #asymptomatic bacteruria  Urine culture 4/12: 100k E. coli ESBL. Urinalysis small LEs. Afebrile since 4/12, no leukocytosis, hold off abx   #DMII  HbA1c 6.9%. FS well controlled  - f/u Endocrine outpatient office, start metformin 850 mg. bid, scripts for diabetic supplies including glucometer and lancets.        SECONDARY DISCHARGE DIAGNOSES  Diagnosis: Pneumonia  Assessment and Plan of Treatment: #Acute hypoxemic respiratory failure secondary to COVID-19 pneumonia  Patient is currently on O2 NC 3L. Chest X-ray 4/14: bilateral opacifications. Inflammatory markers: d-dimer 2567 3/28 > 257 4/15, procalc 0.03 4/7 > 0.04 4/13, ferritin 691 4/7, CRP 47 4/7 . LE Duplex negative 3/23, CT Angio 3/29 negative for PE. LE duplex 4/1: bilateral soleal vein thrombosis, LE duplex 4/6: R gastrocnemius DVT. therapeutic Lovenox 100mg BID > c/w PO eliquis 5mg BID. s/p RDV, course of Levaquin (3/12-3/19). Was on dexamethasone (3/11-3/31), stopped then restarted at 6mg 4/6-4/11, tapered to 4mg (4/11-4/14), now taper to 2mg (4/15-4/16).   - c/w eliquis PO 5mg BID  - use home oxygen at 3LPM as needed  - f/u PCP for post-COVID care, quarantine in hospital completed    Diagnosis: COVID-19  Assessment and Plan of Treatment: #Acute hypoxemic respiratory failure secondary to COVID-19 pneumonia  Patient is currently on O2 NC 3L. Chest X-ray 4/14: bilateral opacifications. Inflammatory markers: d-dimer 2567 3/28 > 257 4/15, procalc 0.03 4/7 > 0.04 4/13, ferritin 691 4/7, CRP 47 4/7 . LE Duplex negative 3/23, CT Angio 3/29 negative for PE. LE duplex 4/1: bilateral soleal vein thrombosis, LE duplex 4/6: R gastrocnemius DVT. therapeutic Lovenox 100mg BID > c/w PO eliquis 5mg BID. s/p RDV, course of Levaquin (3/12-3/19). Was on dexamethasone (3/11-3/31), stopped then restarted at 6mg 4/6-4/11, tapered to 4mg (4/11-4/14), now taper to 2mg (4/15-4/16).   - c/w eliquis PO 5mg BID  - use home oxygen at 3LPM as needed  - f/u PCP for post-COVID care, quarantine in hospital completed      Diagnosis: T2DM (type 2 diabetes mellitus)  Assessment and Plan of Treatment: #DMII  HbA1c 6.9%. FS well controlled  - f/u Endocrine outpatient office, start metformin 850 mg. bid, scripts for diabetic supplies including glucometer and lancets.      Diagnosis: Asymptomatic bacteriuria  Assessment and Plan of Treatment: #asymptomatic bacteruria  Urine culture 4/12: 100k E. coli ESBL. Urinalysis small LEs. Afebrile since 4/12, no leukocytosis, hold off abx

## 2021-04-15 NOTE — DISCHARGE NOTE PROVIDER - NSDCHHCONTACT_GEN_ALL_CORE_FT
Statement Selected
As certified below, I, or a nurse practitioner or physician assistant working with me, had a face-to-face encounter that meets the physician face-to-face encounter requirements.

## 2021-04-15 NOTE — PROGRESS NOTE ADULT - ASSESSMENT
55 yo F with PMHx of HTN, T2DM admitted for acute hypoxemic respiratory failure due to COVID PNA. Downgrade from CEU.     #Acute hypoxemic respiratory failure secondary to COVID-19 pneumonia  - Patient is currently on O2 NC 3L > wean as tolerated   - Chest X-ray 4/14: bilateral opacifications, worsening compared to 4/8; f/u CXR 4/15 official read  - inflammatory markers:   --d-dimer 2567 3/28 > 257 4/15  --procalc 0.03 4/7 > 0.04 4/13  --ferritin 691 4/7 > pending  --CRP 47 4/7 > pending  - LE Duplex negative 3/23, CT Angio 3/29 negative for PE  - LE duplex 4/1: bilateral soleal vein thrombosis, LE duplex 4/6: R gastrocnemius DVT   - s/p RDV, course of Levaquin (3/12-3/19)  - was on dexamethasone (3/11-3/31), stopped then restarted at 6mg 4/6-4/11, tapered to 4mg (4/11-4/14), now taper to 2mg (4/15-4/16)  - s/p therapeutic Lovenox 100mg BID > c/w PO eliquis 5mg BID  - febrile 4/12 to 101F, leukocytosis improving, f/u blood culture 4/12: E. coli ESBL 4/12: >100k E. coli, UA small LEs, and procalc - hold off abx, afebrile since    #asymptomatic bacteruria  UCx 4/12: 100k E. coli ESBL. UA small LEs.  -afebrile since 4/12, no leukocytosis, hold off abx     #HTN  #Sinus tachycardia  - C/w Metoprolol 25 mg BID    #DMII  HbA1c 6.9%  - FS well controlled  - C/w Lantus 8U qHS  - Endocrine consult: f/u lipid profile, outpatient start metformin 850 mg. bid, scripts for diabetic supplies including glucometer and lancets. Follow up outpatient in the office    DVT ppx: started on eliquis  GI ppx:  pantoprazole 40mg qD  Labs: CMP+Mg, CBC  Diet: Consistent Carb  PT/Rehab: STR vs home with PT  Activity: Ambulate as tolerated  Dispo:  home with services pending clinical improvement  FULL CODE

## 2021-04-15 NOTE — DISCHARGE NOTE PROVIDER - HOSPITAL COURSE
Hospital Course:  57 yo F with PMHx of HTN, T2DM admitted for acute hypoxemic respiratory failure due to COVID PNA.     CEU Course: patient is currently on NC 4L sating well and hospital course was complicated by DVTs. Patient is currently on lovenox therapeutic. Downgraded held yesterday as patient was desat in low 80's. Patient underwent PT today, stable now for downgrade. Completed remdesivir course and levaquin course.     3A course: Weaned to 3L NC. Switched from therapeutic lovenox to PO eliquis 5mg BID. Tapering steroids 4mg (4/11-4/14), now taper to 2mg (4/15-4/16). Febrile 4/12 to 101, urine culture showed ESBL E. coli. No leukocytosis and afebrile since, asymptomatic, so no antibiotics.       #Acute hypoxemic respiratory failure secondary to COVID-19 pneumonia  Patient is currently on O2 NC 3L. Chest X-ray 4/14: bilateral opacifications. Inflammatory markers: d-dimer 2567 3/28 > 257 4/15, procalc 0.03 4/7 > 0.04 4/13, ferritin 691 4/7, CRP 47 4/7 . LE Duplex negative 3/23, CT Angio 3/29 negative for PE. LE duplex 4/1: bilateral soleal vein thrombosis, LE duplex 4/6: R gastrocnemius DVT. therapeutic Lovenox 100mg BID > c/w PO eliquis 5mg BID. s/p RDV, course of Levaquin (3/12-3/19). Was on dexamethasone (3/11-3/31), stopped then restarted at 6mg 4/6-4/11, tapered to 4mg (4/11-4/14), now taper to 2mg (4/15-4/16).   - c/w eliquis PO 5mg BID  - use home oxygen at 3LPM as needed  - f/u PCP for post-COVID care, quarantine in hospital completed    #asymptomatic bacteruria  Urine culture 4/12: 100k E. coli ESBL. Urinalysis small LEs. Afebrile since 4/12, no leukocytosis, hold off abx     #DMII  HbA1c 6.9%. FS well controlled  - f/u Endocrine outpatient office, start metformin 850 mg. bid, scripts for diabetic supplies including glucometer and lancets.    Stable for discharge home with home O2       Follow-up:   -c/w eliquis PO 5mg BID  - use home oxygen at 3LPM as needed  - f/u PCP for post-COVID care, quarantine in hospital completed  - f/u Endocrine outpatient office, start metformin 850 mg. bid, scripts for diabetic supplies including glucometer and lancets.

## 2021-04-15 NOTE — CHART NOTE - NSCHARTNOTEFT_GEN_A_CORE
I made rounds today with the treatment team including the hospitalist, residents,  nurses, and discussed the patient's current medical status and discharge  planning needs, and reviewed the chart.    T(C): 36.8 (04-15-21 @ 06:13), Max: 36.8 (04-14-21 @ 13:22)  HR: 80 (04-15-21 @ 06:13) (78 - 98)  BP: 131/61 (04-15-21 @ 06:13) (106/56 - 131/61)  RR: 18 (04-15-21 @ 06:13) (17 - 18)  SpO2: 97% (04-15-21 @ 06:13) (95% - 97%)          I reached out to the patient's health care proxy/ responsible family member-           [  x   ]  I reached    Beck Jack                                 and discussed the patient's medical condition,                   family concerns, and discharge planning           [     ]  I left a message with family               [     ]  I personally participated in rounds with the medical team and my resident and discussed the case. My resident reached                   family member/ HCP                                under my direction and supervision  and we reviewed the conversation.          [     ]  My resident left a message with family under my direction and supervision           [     ]   My resident attended medical rounds and called the family                [    x  ]    The following was discussed:  The patient's medical status over the past 24 hrs was reviewed, as well as oxygen needs and medication changes and labs. Doing well on 3 liters NC O2. to try 2 liters today. Potassium low, labs o/w OK. Getting ready to d/c her home soon.         [  x    ]   The following concerns were raised: none          [     ] I spent 5-10 minutes on the above discussing medical issues with team members and family and/ or my resident    [   x  ] I spent 11-20 minutes on the above discussing medical issues with team members and family and/ or my resident    [     ] I spent 21-30 minutes on the above discussing medical issues with team members and family and/ or my resident

## 2021-04-15 NOTE — CHART NOTE - NSCHARTNOTEFT_GEN_A_CORE
Registered Dietitian Follow-Up - Unable to conduct face to face interview or NFPE d/t pt's isolation precautions r/t COVID-19     Patient Profile Reviewed                          Yes [x]   No []     Nutrition History Previously Obtained        Yes [x]  No []       Pertinent Subjective Information: pt ate 80% of breakfast today and yesterday.      Pertinent Medical Interventions: Acute hypoxemic respiratory failure secondary to COVID-19 pneumonia on high flow. DMII on insulin. Endocrinology c/s. HTN on meds. Full code      Diet order: Diet, Consistent Carbohydrate w/Evening Snack:   Supplement Feeding Modality:  Oral  Glucerna Shake Cans or Servings Per Day:  1       Frequency:  Three Times a day (04-08-21 @ 15:05)    Anthropometrics:  - Ht. 172.7cm  - Wt. 99.6kg (3/11/21) no new weights  - BMI 33.4  - IBW 63.6kg     MEDICATIONS  (STANDING):  apixaban 5 milliGRAM(s) Oral every 12 hours  dexAMETHasone     Tablet 2 milliGRAM(s) Oral daily  insulin glargine Injectable (LANTUS) 8 Unit(s) SubCutaneous at bedtime  insulin lispro (ADMELOG) corrective regimen sliding scale   SubCutaneous three times a day before meals  multivitamin 1 Tablet(s) Oral daily  pantoprazole    Tablet 40 milliGRAM(s) Oral before breakfast    Pertinent Labs: 04-15 @ 08:19: Na 141, BUN 16, Cr 0.7, <H>, K+ 3.4<L>, Phos --, Mg 2.2, Alk Phos 65, ALT/SGPT 34, AST/SGOT 24, HbA1c --    Finger Sticks:  POCT Blood Glucose.: 146 mg/dL (04-15 @ 08:14)  POCT Blood Glucose.: 194 mg/dL (04-14 @ 20:59)  POCT Blood Glucose.: 158 mg/dL (04-14 @ 16:35)    Physical Findings:  - Appearance: alert (1+ edema b/l leg (4/9)   - GI function: Last BM 4/10 rounded   - Tubes:  - Oral/Mouth cavity: no issues  - Skin: excoriation      Nutrition Requirements  Weight Used: 63.6kg IBW d/t BMI >30 (continued per 3/20 RD assessment)      Energy: 2017-2081 kcal/day (25-30 kcal/kg IBW).   Protein: 76-89 g/day (1.2-1.4 g/kg IBW).   Fluids: 1 mL/kcal or per LIP     Nutrient Intake: meeting needs      [] Previous Nutrition Diagnosis: inadequate protein/energy intake            [] Ongoing          [x] Resolved    [] No active nutrition diagnosis identified at this time     Nutrition Intervention: meal/snack, med food supplement      Goal/Expected Outcome: Pt to consume >75% of meal tray in 7 days     Indicator/Monitoring: RD to monitor energy intake, diet order, body comp, NFPF, glucose profile     Recommendation:   - Continue Carb consistent diet w/ glucerna shakes TID

## 2021-04-15 NOTE — DISCHARGE NOTE PROVIDER - NSDCMRMEDTOKEN_GEN_ALL_CORE_FT
apixaban 5 mg oral tablet: 1 tab(s) orally 2 times a day   metFORMIN 850 mg oral tablet: 1 tab(s) orally 2 times a day   metoprolol tartrate 25 mg oral tablet: 1 tab(s) orally 2 times a day  NIFEdipine 30 mg oral tablet, extended release: 1 tab(s) orally once a day

## 2021-04-15 NOTE — DISCHARGE NOTE PROVIDER - PROVIDER TOKENS
PROVIDER:[TOKEN:[[id number]],FOLLOWUP:[2 weeks]],PROVIDER:[TOKEN:[[id number]],FOLLOWUP:[2 weeks]],PROVIDER:[TOKEN:[[id number]],SCHEDULEDAPPT:[04/30/2021],SCHEDULEDAPPTTIME:[08:30 AM]]

## 2021-04-16 ENCOUNTER — TRANSCRIPTION ENCOUNTER (OUTPATIENT)
Age: 56
End: 2021-04-16

## 2021-04-16 VITALS
HEART RATE: 85 BPM | DIASTOLIC BLOOD PRESSURE: 71 MMHG | SYSTOLIC BLOOD PRESSURE: 134 MMHG | TEMPERATURE: 99 F | OXYGEN SATURATION: 96 % | RESPIRATION RATE: 18 BRPM

## 2021-04-16 LAB
ALBUMIN SERPL ELPH-MCNC: 3.4 G/DL — LOW (ref 3.5–5.2)
ALP SERPL-CCNC: 59 U/L — SIGNIFICANT CHANGE UP (ref 30–115)
ALT FLD-CCNC: 35 U/L — SIGNIFICANT CHANGE UP (ref 0–41)
ANION GAP SERPL CALC-SCNC: 13 MMOL/L — SIGNIFICANT CHANGE UP (ref 7–14)
AST SERPL-CCNC: 24 U/L — SIGNIFICANT CHANGE UP (ref 0–41)
BASOPHILS # BLD AUTO: 0.09 K/UL — SIGNIFICANT CHANGE UP (ref 0–0.2)
BASOPHILS NFR BLD AUTO: 0.8 % — SIGNIFICANT CHANGE UP (ref 0–1)
BILIRUB SERPL-MCNC: <0.2 MG/DL — SIGNIFICANT CHANGE UP (ref 0.2–1.2)
BUN SERPL-MCNC: 16 MG/DL — SIGNIFICANT CHANGE UP (ref 10–20)
CALCIUM SERPL-MCNC: 9.5 MG/DL — SIGNIFICANT CHANGE UP (ref 8.5–10.1)
CHLORIDE SERPL-SCNC: 106 MMOL/L — SIGNIFICANT CHANGE UP (ref 98–110)
CHOLEST SERPL-MCNC: 335 MG/DL — HIGH
CO2 SERPL-SCNC: 23 MMOL/L — SIGNIFICANT CHANGE UP (ref 17–32)
CREAT SERPL-MCNC: 0.7 MG/DL — SIGNIFICANT CHANGE UP (ref 0.7–1.5)
CRP SERPL-MCNC: 16 MG/L — HIGH
EOSINOPHIL # BLD AUTO: 0.85 K/UL — HIGH (ref 0–0.7)
EOSINOPHIL NFR BLD AUTO: 8 % — SIGNIFICANT CHANGE UP (ref 0–8)
FERRITIN SERPL-MCNC: 478 NG/ML — HIGH (ref 15–150)
GLUCOSE BLDC GLUCOMTR-MCNC: 105 MG/DL — HIGH (ref 70–99)
GLUCOSE BLDC GLUCOMTR-MCNC: 126 MG/DL — HIGH (ref 70–99)
GLUCOSE BLDC GLUCOMTR-MCNC: 169 MG/DL — HIGH (ref 70–99)
GLUCOSE SERPL-MCNC: 107 MG/DL — HIGH (ref 70–99)
HCT VFR BLD CALC: 38.2 % — SIGNIFICANT CHANGE UP (ref 37–47)
HDLC SERPL-MCNC: 32 MG/DL — LOW
HGB BLD-MCNC: 11.6 G/DL — LOW (ref 12–16)
IMM GRANULOCYTES NFR BLD AUTO: 3.1 % — HIGH (ref 0.1–0.3)
LIPID PNL WITH DIRECT LDL SERPL: 262 MG/DL — HIGH
LYMPHOCYTES # BLD AUTO: 2.45 K/UL — SIGNIFICANT CHANGE UP (ref 1.2–3.4)
LYMPHOCYTES # BLD AUTO: 23 % — SIGNIFICANT CHANGE UP (ref 20.5–51.1)
MAGNESIUM SERPL-MCNC: 2 MG/DL — SIGNIFICANT CHANGE UP (ref 1.8–2.4)
MCHC RBC-ENTMCNC: 28.2 PG — SIGNIFICANT CHANGE UP (ref 27–31)
MCHC RBC-ENTMCNC: 30.4 G/DL — LOW (ref 32–37)
MCV RBC AUTO: 92.7 FL — SIGNIFICANT CHANGE UP (ref 81–99)
MONOCYTES # BLD AUTO: 0.97 K/UL — HIGH (ref 0.1–0.6)
MONOCYTES NFR BLD AUTO: 9.1 % — SIGNIFICANT CHANGE UP (ref 1.7–9.3)
NEUTROPHILS # BLD AUTO: 5.95 K/UL — SIGNIFICANT CHANGE UP (ref 1.4–6.5)
NEUTROPHILS NFR BLD AUTO: 56 % — SIGNIFICANT CHANGE UP (ref 42.2–75.2)
NON HDL CHOLESTEROL: 303 MG/DL — HIGH
NRBC # BLD: 0 /100 WBCS — SIGNIFICANT CHANGE UP (ref 0–0)
PLATELET # BLD AUTO: 369 K/UL — SIGNIFICANT CHANGE UP (ref 130–400)
POTASSIUM SERPL-MCNC: 3.8 MMOL/L — SIGNIFICANT CHANGE UP (ref 3.5–5)
POTASSIUM SERPL-SCNC: 3.8 MMOL/L — SIGNIFICANT CHANGE UP (ref 3.5–5)
PROCALCITONIN SERPL-MCNC: 0.03 NG/ML — SIGNIFICANT CHANGE UP (ref 0.02–0.1)
PROT SERPL-MCNC: 5.9 G/DL — LOW (ref 6–8)
RBC # BLD: 4.12 M/UL — LOW (ref 4.2–5.4)
RBC # FLD: 13.1 % — SIGNIFICANT CHANGE UP (ref 11.5–14.5)
SODIUM SERPL-SCNC: 142 MMOL/L — SIGNIFICANT CHANGE UP (ref 135–146)
TRIGL SERPL-MCNC: 171 MG/DL — HIGH
WBC # BLD: 10.64 K/UL — SIGNIFICANT CHANGE UP (ref 4.8–10.8)
WBC # FLD AUTO: 10.64 K/UL — SIGNIFICANT CHANGE UP (ref 4.8–10.8)

## 2021-04-16 PROCEDURE — 99239 HOSP IP/OBS DSCHRG MGMT >30: CPT

## 2021-04-16 RX ADMIN — Medication 25 MILLIGRAM(S): at 05:34

## 2021-04-16 RX ADMIN — Medication 30 MILLIGRAM(S): at 05:34

## 2021-04-16 RX ADMIN — PANTOPRAZOLE SODIUM 40 MILLIGRAM(S): 20 TABLET, DELAYED RELEASE ORAL at 05:34

## 2021-04-16 RX ADMIN — Medication 2 MILLIGRAM(S): at 05:34

## 2021-04-16 RX ADMIN — CHLORHEXIDINE GLUCONATE 1 APPLICATION(S): 213 SOLUTION TOPICAL at 05:34

## 2021-04-16 RX ADMIN — APIXABAN 5 MILLIGRAM(S): 2.5 TABLET, FILM COATED ORAL at 05:34

## 2021-04-16 RX ADMIN — APIXABAN 5 MILLIGRAM(S): 2.5 TABLET, FILM COATED ORAL at 17:27

## 2021-04-16 RX ADMIN — Medication 1 TABLET(S): at 11:33

## 2021-04-16 RX ADMIN — Medication 25 MILLIGRAM(S): at 17:27

## 2021-04-16 RX ADMIN — Medication 1: at 11:38

## 2021-04-16 NOTE — PROGRESS NOTE ADULT - ATTENDING COMMENTS
I saw and evaluated patient  by bedside, mild dyspnea at rest, hypoxic on HF, tachy , no fever   All labs, radiology studies, VS was reviewed  I have reviewed the resident's note and agree with documented findings and  plan of care.  a/p:  Patient is a 56-year-old, female patient, PMHx: Hypertension, presented to the ED for Shortness of breath and found to have    Acute Hypoxemic respiratory failure secondary to COVID 19 PNA  Suspected superimposed bacterial PNA  transaminitis  currently on HFNC  50/90- sat 96%  Blood cx NGTD  Procal 21---> 4.08, on ceftriaxone and levaquin  f/u urine strep and legionella   on dexamethasone 6mg daily, started 3/10  s/p RDV, started 3/11  ID consulted,  too early for Toci  Ddimer 5287--->91425--- 50436 , LE duplex 3/12 negative for DVT  increase lovenox to ther. dose 100mg subc.  Q12H   trend inflammatory markers  taper down o2 as tolerated    Hypertension with sinus tachy- started on lopressor 25mg po twice daily tx.      New onset DM2   Steroid induced hyperglycemia  Hba1c 6.9, patient was not being treated for DM as OP  c/w insulin for now given that she is on steroids  monitor FS    Covid induced thrombosis state - critical ddimer 86547- switched to full dose anticoagulation tx. on 3/16- lovenox 100 mg subc. twice daily tx.    #Progress Note Handoff: continue close pulse ox monitoring, HF, IV dexa. trend ddimer  Family discussion: yes, medical team Disposition: Home___/SNF___once medically stable
Patient c/o mild cough, no dyspnea at rest, no fever  a/p:  Patient is a 56-year-old, female patient, PMHx: Hypertension, presented to the ED for Shortness of breath and found to have COVID19 PNA    # Acute Hypoxic Respiratory Failure secondary to COVID 19 PNA  - 4/14 3 L NC saturating 93%- at rest  desaturates when working with PT  s/p RDV therapy and s/p Levaquin x7d (03/12-03/19)  CTA 03/29: No PE, LE Duplex (04/01): b/l soleal vein thrombosis  BNP 04/02 60  on decadron taper, decrease to 2mg daily   Taper down oxygen requirements as tolerated  aggressive PT  dietary following for nutrition    #Acute DVT- due to covid induced thrombosis- initially was on  therapeutic Lovenox , switched to PO Eliquis tx.    #HTN  Sinus tachycardia  Metoprolol tartrate 25 BID    #DM2, new onset- DM education  Hba1c 6.9 (03/12)  Lantus 8units with ISS    #Progress Note Handoff: assess Oxygen level on room air, repeat CXR today. start d/c process .   Family discussion: yes, communication team Disposition: Home___in 24 hours.
Patient is a 56-year-old, female patient, PMHx: Hypertension, presented to the ED for Shortness of breath and found to have COVID19 PNA    # Acute Hypoxic Respiratory Failure secondary to COVID 19 PNA  - 4/14 3 L NC saturating 93%- at rest  -4/15- 3 L NC sat 97%, on room air desaturating to 84%- patient will need home oxygen tx. arrangement  4/16: 3 L NC sat 95%    s/p RDV therapy and s/p Levaquin x7d (03/12-03/19)  CTA 03/29: No PE, LE Duplex (04/01): b/l soleal vein thrombosis  BNP 04/02 60  on decadron taper, decrease to 2mg daily - d/c tomorrow.      #Acute DVT- due to covid induced thrombosis- initially was on  therapeutic Lovenox , switched to PO Eliquis tx.    #HTN  Sinus tachycardia  Metoprolol tartrate 25 BID    #DM2, new onset- DM education  Hba1c 6.9 (03/12)  upon d/c home started on metformin 850 mg po twice daily    #Progress Note Handoff: Patient was medically optimized, stable for d/c home   Family discussion: d/c plan of care d/w pt. by bedside Disposition: Home__once home oxygen tx. is arranged .
Patient feels good today, no dyspnea, no cough, tolerating diet well.  a/p:  Patient is a 56-year-old, female patient, PMHx: Hypertension, presented to the ED for Shortness of breath and found to have COVID19 PNA    # Acute Hypoxic Respiratory Failure secondary to COVID 19 PNA  - 4/14 3 L NC saturating 93%- at rest  -4/15- 3 L NC sat 97%, on room air desaturating to 84%- patient will need home oxygen tx. arrangement    s/p RDV therapy and s/p Levaquin x7d (03/12-03/19)  CTA 03/29: No PE, LE Duplex (04/01): b/l soleal vein thrombosis  BNP 04/02 60  on decadron taper, decrease to 2mg daily - d/c tomorrow.      #Acute DVT- due to covid induced thrombosis- initially was on  therapeutic Lovenox , switched to PO Eliquis tx.    #HTN  Sinus tachycardia  Metoprolol tartrate 25 BID    #DM2, new onset- DM education  Hba1c 6.9 (03/12)  upon d/c home started on metformin 850 mg po twice daily    #Progress Note Handoff: Patient was medically optimized, stable for d/c home   Family discussion: d/c plan of care d/w pt. by bedside Disposition: Home__once home oxygen tx. is arranged

## 2021-04-16 NOTE — DISCHARGE NOTE NURSING/CASE MANAGEMENT/SOCIAL WORK - PATIENT PORTAL LINK FT
You can access the FollowMyHealth Patient Portal offered by [facility] by registering at the following website: [email or web address]. By joining [facility]’s FollowMyHealth portal, you will also be able to view your health information using other applications (apps) compatible with our system.

## 2021-04-16 NOTE — PROGRESS NOTE ADULT - NSICDXPILOT_GEN_ALL_CORE
[place]

## 2021-04-16 NOTE — PROGRESS NOTE ADULT - PROVIDER SPECIALTY LIST ADULT
Hospitalist
Internal Medicine
Hospitalist
Hospitalist
Internal Medicine
Pulmonology
Pulmonology
Hospitalist
Internal Medicine
Pulmonology
Critical Care
Infectious Disease
Internal Medicine
Pulmonology
Pulmonology
Hospitalist
Internal Medicine
Pulmonology
Pulmonology
Infectious Disease

## 2021-04-16 NOTE — PROGRESS NOTE ADULT - TIME BILLING
complete patient's bedside assessment, review medical chart, discuss discharge  medical plan of care with covering medical team
complete patient's bedside assessment, review medical chart, discuss discharge medical plan of care with covering medical team
complete patient's bedside assessment, review medical chart, discuss medical plan of care with covering medical team

## 2021-04-16 NOTE — CHART NOTE - NSCHARTNOTESELECT_GEN_ALL_CORE
COVID Communication Team/Event Note
Event Note
Event Note
RD follow up/Event Note
RD follow up/Event Note
COVID Communication Team/Event Note
Comms/Event Note
Downgrade/Transfer Note
Event Note
Goals of Care conversation/Event Note
RD Follow-Up/Event Note
RD limited follow up/Event Note
downgrade/Transfer Note

## 2021-04-16 NOTE — PROGRESS NOTE ADULT - ASSESSMENT
55 yo F with PMHx of HTN, T2DM admitted for acute hypoxemic respiratory failure due to COVID PNA. Downgrade from CEU.     #Acute hypoxemic respiratory failure secondary to COVID-19 pneumonia  - Patient is currently on O2 NC 3L > wean as tolerated   - Chest X-ray 4/14: bilateral opacifications, worsening compared to 4/8; f/u CXR 4/15: unchanged b/l opacities  - inflammatory markers:   --d-dimer 2567 3/28 > 257 4/15  --procalc 0.03 4/7 > 0.04 4/13 > 0.03 4/15  --ferritin 691 4/7 > 478 4/15  --CRP 47 4/7 > 16 4/15  - LE Duplex negative 3/23, CT Angio 3/29 negative for PE  - LE duplex 4/1: bilateral soleal vein thrombosis, LE duplex 4/6: R gastrocnemius DVT   - s/p RDV, course of Levaquin (3/12-3/19)  - was on dexamethasone (3/11-3/31), stopped then restarted at 6mg 4/6-4/11, tapered to 4mg (4/11-4/14), now taper to 2mg (4/15-4/16) to complete course  - s/p therapeutic Lovenox 100mg BID > c/w PO eliquis 5mg BID  - febrile 4/12 to 101F, leukocytosis improving, f/u blood culture 4/12: E. coli ESBL 4/12: >100k E. coli, UA small LEs, and procalc - hold off abx, afebrile since    #asymptomatic bacteruria  UCx 4/12: 100k E. coli ESBL. UA small LEs.  -afebrile since 4/12, no leukocytosis, hold off abx     #HTN  #Sinus tachycardia  - C/w Metoprolol 25 mg BID    #DMII  HbA1c 6.9%  - FS well controlled  - C/w Lantus 8U qHS  - Endocrine consult: f/u lipid profile, outpatient start metformin 850 mg. bid, scripts for diabetic supplies including glucometer and lancets. Follow up outpatient in the office    DVT ppx: started on eliquis  GI ppx:  pantoprazole 40mg qD  Labs: CMP+Mg, CBC  Diet: Consistent Carb  PT/Rehab: STR vs home with PT  Activity: Ambulate as tolerated  Dispo:  home with services pending clinical improvement  FULL CODE  D/C today with home oxygen and home services

## 2021-04-16 NOTE — PROGRESS NOTE ADULT - SUBJECTIVE AND OBJECTIVE BOX
SUBJECTIVE:    Patient is a 56y old Female who presents with a chief complaint of Shortness of breath (15 Apr 2021 13:19)    Currently admitted to medicine with the primary diagnosis of Acute respiratory failure with hypoxia       Today is hospital day 37d. This morning she is resting comfortably in bed and reports no new issues or overnight events. Afebrile. This AM, no fevers, chills. Denies CP or SOB. Has been eating. On 2.5L NC.       PAST MEDICAL & SURGICAL HISTORY    SOCIAL HISTORY:  Negative for smoking/alcohol/drug use.     ALLERGIES:  Allergy Status Unknown    MEDICATIONS:  STANDING MEDICATIONS  apixaban 5 milliGRAM(s) Oral every 12 hours  chlorhexidine 4% Liquid 1 Application(s) Topical daily  dextrose 40% Gel 15 Gram(s) Oral once  dextrose 5%. 1000 milliLiter(s) IV Continuous <Continuous>  insulin glargine Injectable (LANTUS) 8 Unit(s) SubCutaneous at bedtime  insulin lispro (ADMELOG) corrective regimen sliding scale   SubCutaneous three times a day before meals  metoprolol tartrate 25 milliGRAM(s) Oral two times a day  multivitamin 1 Tablet(s) Oral daily  NIFEdipine XL 30 milliGRAM(s) Oral daily  pantoprazole    Tablet 40 milliGRAM(s) Oral before breakfast    PRN MEDICATIONS  acetaminophen   Tablet .. 650 milliGRAM(s) Oral every 6 hours PRN  guaifenesin/dextromethorphan  Syrup 10 milliLiter(s) Oral every 6 hours PRN    VITALS:   T(F): 98.1  HR: 95  BP: 129/60  RR: 18  SpO2: 95%    LABS:                        11.6   10.64 )-----------( 369      ( 16 Apr 2021 06:27 )             38.2     04-16    142  |  106  |  16  ----------------------------<  107<H>  3.8   |  23  |  0.7    Ca    9.5      16 Apr 2021 06:27  Mg     2.0     04-16    TPro  5.9<L>  /  Alb  3.4<L>  /  TBili  <0.2  /  DBili  x   /  AST  24  /  ALT  35  /  AlkPhos  59  04-16      RADIOLOGY:  < from: Xray Chest 1 View- PORTABLE-Routine (Xray Chest 1 View- PORTABLE-Routine in AM.) (04.15.21 @ 09:19) >  IMPRESSION:  Unchanged bilateral opacities.  < end of copied text >    PHYSICAL EXAM:  GEN: NAD, lying in bed comfortably  HEENT: EOMI, PERRLA, conjunctiva and sclera clear. MMM.  LUNGS: Clear to auscultation bilaterally. No rales, rhonchi, or wheezing appreciated. On 2.5L NC.  HEART: S1/S2 present. RRR.   ABD: Soft, non-tender, non-distended. Bowel sounds present.  EXT: 2+ peripheral pulses. No clubbing, cyanosis, or edema.   NEURO: AAOX3. Speech clear. No focal neurological deficits. Sensation grossly intact.   Skin: No rashes or lesions.

## 2021-04-16 NOTE — PROGRESS NOTE ADULT - REASON FOR ADMISSION

## 2021-04-16 NOTE — CHART NOTE - NSCHARTNOTEFT_GEN_A_CORE
I made rounds today with the treatment team including the hospitalist, residents,  nurses, and discussed the patient's current medical status and discharge  planning needs, and reviewed the chart.    T(C): 36.7 (04-16-21 @ 05:51), Max: 37.2 (04-15-21 @ 13:16)  HR: 95 (04-16-21 @ 05:51) (71 - 95)  BP: 129/60 (04-16-21 @ 05:51) (108/55 - 129/60)  RR: 18 (04-16-21 @ 05:51) (18 - 18)  SpO2: 95% (04-16-21 @ 05:51) (91% - 98%)          I reached out to the patient's health care proxy/ responsible family member-           [    x ]  I reached     Beck Jack                                and discussed the patient's medical condition,                   family concerns, and discharge planning           [     ]  I left a message with family               [     ]  I personally participated in rounds with the medical team and my resident and discussed the case. My resident reached                   family member/ HCP                                under my direction and supervision  and we reviewed the conversation.          [     ]  My resident left a message with family under my direction and supervision           [     ]   My resident attended medical rounds and called the family                [      ]    The following was discussed:  The patient's medical status over the past 24 hrs was reviewed, as well as oxygen needs and medication changes and labs. Medically stable on 3 liters O2. Cleared for d/c home today after O2 delivery.          [    x  ]   The following concerns were raised: none          [     ] I spent 5-10 minutes on the above discussing medical issues with team members and family and/ or my resident    [    x ] I spent 11-20 minutes on the above discussing medical issues with team members and family and/ or my resident    [     ] I spent 21-30 minutes on the above discussing medical issues with team members and family and/ or my resident

## 2021-04-17 LAB
CULTURE RESULTS: SIGNIFICANT CHANGE UP
SPECIMEN SOURCE: SIGNIFICANT CHANGE UP

## 2021-04-21 DIAGNOSIS — E11.65 TYPE 2 DIABETES MELLITUS WITH HYPERGLYCEMIA: ICD-10-CM

## 2021-04-21 DIAGNOSIS — R79.89 OTHER SPECIFIED ABNORMAL FINDINGS OF BLOOD CHEMISTRY: ICD-10-CM
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
## 2021-04-21 DIAGNOSIS — J96.01 ACUTE RESPIRATORY FAILURE WITH HYPOXIA: ICD-10-CM

## 2021-04-21 DIAGNOSIS — R06.02 SHORTNESS OF BREATH: ICD-10-CM

## 2021-04-21 DIAGNOSIS — R00.0 TACHYCARDIA, UNSPECIFIED: ICD-10-CM

## 2021-04-21 DIAGNOSIS — E66.9 OBESITY, UNSPECIFIED: ICD-10-CM

## 2021-04-21 DIAGNOSIS — E87.2 ACIDOSIS: ICD-10-CM

## 2021-04-21 DIAGNOSIS — J12.82 PNEUMONIA DUE TO CORONAVIRUS DISEASE 2019: ICD-10-CM

## 2021-04-21 DIAGNOSIS — T38.0X5A ADVERSE EFFECT OF GLUCOCORTICOIDS AND SYNTHETIC ANALOGUES, INITIAL ENCOUNTER: ICD-10-CM

## 2021-04-21 DIAGNOSIS — D72.810 LYMPHOCYTOPENIA: ICD-10-CM

## 2021-04-21 DIAGNOSIS — Z66 DO NOT RESUSCITATE: ICD-10-CM

## 2021-04-21 DIAGNOSIS — J15.9 UNSPECIFIED BACTERIAL PNEUMONIA: ICD-10-CM

## 2021-04-21 DIAGNOSIS — U07.1 COVID-19: ICD-10-CM

## 2021-04-21 DIAGNOSIS — R74.01 ELEVATION OF LEVELS OF LIVER TRANSAMINASE LEVELS: ICD-10-CM

## 2021-04-21 DIAGNOSIS — I82.461 ACUTE EMBOLISM AND THROMBOSIS OF RIGHT CALF MUSCULAR VEIN: ICD-10-CM

## 2021-04-21 DIAGNOSIS — I10 ESSENTIAL (PRIMARY) HYPERTENSION: ICD-10-CM

## 2021-04-21 DIAGNOSIS — I82.463 ACUTE EMBOLISM AND THROMBOSIS OF CALF MUSCULAR VEIN, BILATERAL: ICD-10-CM

## 2021-04-21 DIAGNOSIS — R63.8 OTHER SYMPTOMS AND SIGNS CONCERNING FOOD AND FLUID INTAKE: ICD-10-CM

## 2021-05-05 ENCOUNTER — APPOINTMENT (OUTPATIENT)
Dept: INTERNAL MEDICINE | Facility: CLINIC | Age: 56
End: 2021-05-05
Payer: SELF-PAY

## 2021-05-05 ENCOUNTER — OUTPATIENT (OUTPATIENT)
Dept: OUTPATIENT SERVICES | Facility: HOSPITAL | Age: 56
LOS: 1 days | Discharge: HOME | End: 2021-05-05

## 2021-05-05 VITALS
SYSTOLIC BLOOD PRESSURE: 133 MMHG | HEART RATE: 92 BPM | OXYGEN SATURATION: 99 % | DIASTOLIC BLOOD PRESSURE: 71 MMHG | TEMPERATURE: 97.4 F

## 2021-05-05 PROCEDURE — 99203 OFFICE O/P NEW LOW 30 MIN: CPT | Mod: GC

## 2021-05-05 NOTE — ASSESSMENT
[FreeTextEntry1] : Mr. Farias is a 56 year old female with PMHx of HTN, T2DM who was recently discharged from [facility] for acute hypoxemic respiratory failure due to COVID PNA requiring supplemental oxygen, course was complicated by DVT and started on eliquis. \par \par \par #Acute hypoxemic respiratory failure secondary to COVID-19 pneumonia\par - Patient requiring supplemental oxygen and weaned to 3 L NC\par - finished course of remdesivir, decadron and levaquin\par - course complicated by bilateral soleal vein thrombosis and started don elqiuis 5 mg bid\par - patient compliant with eliquis \par - patient has pulmonary appointment scheduled \par - COVID clinic referral given \par \par \par #DMII\par HbA1c 6.9%. FS well controlled\par - f/u Endocrine outpatient office, c/w metformin 850 mg\par \par \par #HTN (controlled)\par - c/w nifedipine 30 mg od and metoprolol 25 mg bid\par \par #HCM\par - medications renewed \par - Colonoscopy: no previous colonoscopy -> referral given\par - Mammogram: no previous mammogram -> script given\par - Pap smear -> OBGYN referral given \par - RTC in 3 months/prn\par

## 2021-05-05 NOTE — HISTORY OF PRESENT ILLNESS
[FreeTextEntry8] : Mr. Farias is a 56 year old female with PMHx of HTN, T2DM who was recently discharged from [facility] for acute hypoxemic respiratory failure due to COVID PNA requiring supplemental oxygen, course was complicated by DVT and started on eliquis. Patient completed course of remdesivir, steroids and levaquin.\par Patient currently on 4 L oxygen, reports she still has difficult breathing and is compliant with her medication. Patient denies chest pain; bowel changes, excessive sweating, skin changes. \par Patient has an appointment with pulmonologist and endocrinologist as well. \par

## 2021-05-05 NOTE — REVIEW OF SYSTEMS
[Fatigue] : fatigue [Shortness Of Breath] : shortness of breath [Dyspnea on Exertion] : dyspnea on exertion [Fever] : no fever [Chills] : no chills [Night Sweats] : no night sweats [Discharge] : no discharge [Vision Problems] : no vision problems [Earache] : no earache [Nasal Discharge] : no nasal discharge [Chest Pain] : no chest pain [Palpitations] : no palpitations [Orthopnea] : no orthopnea [Paroxysmal Nocturnal Dyspnea] : no paroxysmal nocturnal dyspnea [Wheezing] : no wheezing [Cough] : no cough [Abdominal Pain] : no abdominal pain [Nausea] : no nausea [Vomiting] : no vomiting

## 2021-05-05 NOTE — PHYSICAL EXAM
[No Acute Distress] : no acute distress [Normal Sclera/Conjunctiva] : normal sclera/conjunctiva [Normal Outer Ear/Nose] : the outer ears and nose were normal in appearance [No JVD] : no jugular venous distention [No Accessory Muscle Use] : no accessory muscle use [Clear to Auscultation] : lungs were clear to auscultation bilaterally [Normal Rate] : normal rate  [Regular Rhythm] : with a regular rhythm [Soft] : abdomen soft [Non Tender] : non-tender [No HSM] : no HSM [de-identified] : on 4 L NC via oxygen tank

## 2021-05-06 DIAGNOSIS — I82.409 ACUTE EMBOLISM AND THROMBOSIS OF UNSPECIFIED DEEP VEINS OF UNSPECIFIED LOWER EXTREMITY: ICD-10-CM

## 2021-05-20 ENCOUNTER — APPOINTMENT (OUTPATIENT)
Dept: INTERNAL MEDICINE | Facility: CLINIC | Age: 56
End: 2021-05-20
Payer: MEDICAID

## 2021-05-20 ENCOUNTER — EMERGENCY (EMERGENCY)
Facility: HOSPITAL | Age: 56
LOS: 0 days | Discharge: HOME | End: 2021-05-20
Attending: EMERGENCY MEDICINE | Admitting: EMERGENCY MEDICINE
Payer: MEDICAID

## 2021-05-20 ENCOUNTER — OUTPATIENT (OUTPATIENT)
Dept: OUTPATIENT SERVICES | Facility: HOSPITAL | Age: 56
LOS: 1 days | Discharge: HOME | End: 2021-05-20

## 2021-05-20 VITALS
DIASTOLIC BLOOD PRESSURE: 66 MMHG | OXYGEN SATURATION: 94 % | RESPIRATION RATE: 18 BRPM | SYSTOLIC BLOOD PRESSURE: 152 MMHG | HEIGHT: 68 IN | WEIGHT: 179.9 LBS | HEART RATE: 103 BPM | TEMPERATURE: 98 F

## 2021-05-20 VITALS
RESPIRATION RATE: 17 BRPM | HEART RATE: 89 BPM | OXYGEN SATURATION: 96 % | DIASTOLIC BLOOD PRESSURE: 65 MMHG | TEMPERATURE: 97 F | SYSTOLIC BLOOD PRESSURE: 112 MMHG

## 2021-05-20 DIAGNOSIS — E11.9 TYPE 2 DIABETES MELLITUS WITHOUT COMPLICATIONS: ICD-10-CM

## 2021-05-20 DIAGNOSIS — R59.0 LOCALIZED ENLARGED LYMPH NODES: ICD-10-CM

## 2021-05-20 DIAGNOSIS — R06.02 SHORTNESS OF BREATH: ICD-10-CM

## 2021-05-20 DIAGNOSIS — R05 COUGH: ICD-10-CM

## 2021-05-20 DIAGNOSIS — I10 ESSENTIAL (PRIMARY) HYPERTENSION: ICD-10-CM

## 2021-05-20 DIAGNOSIS — R06.03 ACUTE RESPIRATORY DISTRESS: ICD-10-CM

## 2021-05-20 DIAGNOSIS — U07.1 COVID-19: ICD-10-CM

## 2021-05-20 DIAGNOSIS — Z99.81 DEPENDENCE ON SUPPLEMENTAL OXYGEN: ICD-10-CM

## 2021-05-20 LAB
ALBUMIN SERPL ELPH-MCNC: 4 G/DL — SIGNIFICANT CHANGE UP (ref 3.5–5.2)
ALP SERPL-CCNC: 82 U/L — SIGNIFICANT CHANGE UP (ref 30–115)
ALT FLD-CCNC: 14 U/L — SIGNIFICANT CHANGE UP (ref 0–41)
ANION GAP SERPL CALC-SCNC: 14 MMOL/L — SIGNIFICANT CHANGE UP (ref 7–14)
APTT BLD: 48.9 SEC — HIGH (ref 27–39.2)
AST SERPL-CCNC: 19 U/L — SIGNIFICANT CHANGE UP (ref 0–41)
BASOPHILS # BLD AUTO: 0.08 K/UL — SIGNIFICANT CHANGE UP (ref 0–0.2)
BASOPHILS NFR BLD AUTO: 0.7 % — SIGNIFICANT CHANGE UP (ref 0–1)
BILIRUB SERPL-MCNC: 0.3 MG/DL — SIGNIFICANT CHANGE UP (ref 0.2–1.2)
BUN SERPL-MCNC: 7 MG/DL — LOW (ref 10–20)
CALCIUM SERPL-MCNC: 9.7 MG/DL — SIGNIFICANT CHANGE UP (ref 8.5–10.1)
CHLORIDE SERPL-SCNC: 97 MMOL/L — LOW (ref 98–110)
CO2 SERPL-SCNC: 32 MMOL/L — SIGNIFICANT CHANGE UP (ref 17–32)
CREAT SERPL-MCNC: 0.6 MG/DL — LOW (ref 0.7–1.5)
EOSINOPHIL # BLD AUTO: 1.28 K/UL — HIGH (ref 0–0.7)
EOSINOPHIL NFR BLD AUTO: 11 % — HIGH (ref 0–8)
GLUCOSE SERPL-MCNC: 114 MG/DL — HIGH (ref 70–99)
HCG SERPL QL: NEGATIVE — SIGNIFICANT CHANGE UP
HCT VFR BLD CALC: 40.3 % — SIGNIFICANT CHANGE UP (ref 37–47)
HGB BLD-MCNC: 12.5 G/DL — SIGNIFICANT CHANGE UP (ref 12–16)
IMM GRANULOCYTES NFR BLD AUTO: 0.5 % — HIGH (ref 0.1–0.3)
INR BLD: 1.57 RATIO — HIGH (ref 0.65–1.3)
LYMPHOCYTES # BLD AUTO: 2.6 K/UL — SIGNIFICANT CHANGE UP (ref 1.2–3.4)
LYMPHOCYTES # BLD AUTO: 22.4 % — SIGNIFICANT CHANGE UP (ref 20.5–51.1)
MCHC RBC-ENTMCNC: 27.8 PG — SIGNIFICANT CHANGE UP (ref 27–31)
MCHC RBC-ENTMCNC: 31 G/DL — LOW (ref 32–37)
MCV RBC AUTO: 89.6 FL — SIGNIFICANT CHANGE UP (ref 81–99)
MONOCYTES # BLD AUTO: 0.76 K/UL — HIGH (ref 0.1–0.6)
MONOCYTES NFR BLD AUTO: 6.6 % — SIGNIFICANT CHANGE UP (ref 1.7–9.3)
NEUTROPHILS # BLD AUTO: 6.82 K/UL — HIGH (ref 1.4–6.5)
NEUTROPHILS NFR BLD AUTO: 58.8 % — SIGNIFICANT CHANGE UP (ref 42.2–75.2)
NRBC # BLD: 0 /100 WBCS — SIGNIFICANT CHANGE UP (ref 0–0)
PLATELET # BLD AUTO: 521 K/UL — HIGH (ref 130–400)
POTASSIUM SERPL-MCNC: 3.1 MMOL/L — LOW (ref 3.5–5)
POTASSIUM SERPL-SCNC: 3.1 MMOL/L — LOW (ref 3.5–5)
PROT SERPL-MCNC: 7.2 G/DL — SIGNIFICANT CHANGE UP (ref 6–8)
PROTHROM AB SERPL-ACNC: 18 SEC — HIGH (ref 9.95–12.87)
RBC # BLD: 4.5 M/UL — SIGNIFICANT CHANGE UP (ref 4.2–5.4)
RBC # FLD: 14.3 % — SIGNIFICANT CHANGE UP (ref 11.5–14.5)
SODIUM SERPL-SCNC: 143 MMOL/L — SIGNIFICANT CHANGE UP (ref 135–146)
WBC # BLD: 11.6 K/UL — HIGH (ref 4.8–10.8)
WBC # FLD AUTO: 11.6 K/UL — HIGH (ref 4.8–10.8)

## 2021-05-20 PROCEDURE — 93010 ELECTROCARDIOGRAM REPORT: CPT | Mod: 77

## 2021-05-20 PROCEDURE — 93010 ELECTROCARDIOGRAM REPORT: CPT

## 2021-05-20 PROCEDURE — 71275 CT ANGIOGRAPHY CHEST: CPT | Mod: 26,MA

## 2021-05-20 PROCEDURE — 99212 OFFICE O/P EST SF 10 MIN: CPT | Mod: GC

## 2021-05-20 PROCEDURE — 99285 EMERGENCY DEPT VISIT HI MDM: CPT

## 2021-05-20 RX ORDER — POTASSIUM CHLORIDE 20 MEQ
40 PACKET (EA) ORAL ONCE
Refills: 0 | Status: COMPLETED | OUTPATIENT
Start: 2021-05-20 | End: 2021-05-20

## 2021-05-20 RX ADMIN — Medication 40 MILLIEQUIVALENT(S): at 13:37

## 2021-05-20 NOTE — ED PROVIDER NOTE - PATIENT PORTAL LINK FT
You can access the FollowMyHealth Patient Portal offered by [facility] by registering at the following website: [email or web address]. By joining [employer]’s FollowMyHealth portal, you will also be able to view your health information using other applications (apps) compatible with our system.

## 2021-05-20 NOTE — ED ADULT NURSE NOTE - OBJECTIVE STATEMENT
Patient sent in from PMD for abnormal EKG. Patient denies any pain or discomfort at this time, denies n/v/f/c/sob. Patient on home O2- 5L sating 99%. No s/s of resp distress noted.

## 2021-05-20 NOTE — ED PROVIDER NOTE - OBJECTIVE STATEMENT
56 y.o. female with a PMH of HTN and DM presented to the ER for PE r/o.  Pt s/p hospital d/c on 4/16/2021 for Covid on home O2.  Pt has been on 5L O2 NC.  SOB not getting any worse.  Cough improving.  Pt denies chest pain, dizziness, lower extremity edema/pain, travel.  Pt referred to ER by PCP today who felt pt looked more tachypneic.  Pt w/o complaints.

## 2021-05-20 NOTE — ED PROVIDER NOTE - ATTENDING CONTRIBUTION TO CARE
56 yr old female hx of [facility], dc on 4/16 on home o2 here for eval. pt reports she was feelign fine, went to f/u with pulmonary and was noted to be tachypneac so brought in. pt to me denies any fever, chills, chest pain, sob, leg swelling. pt is on 3L at home. The pt was seen in pulmonologist office and advised to get PE study in ed. On exam pt no distress, s1s2 faint crackles bl, soft nt/nd, no calf swelling.   Impression - observed sob, pt is satting well on 3L, no distress, screening labs grossly wnl, ct angio showing .Bilateral diffuse lung opacities, increased at the apices and improved at the bases. New mediastinal lymphadenopathy, possibly reactive in nature. Follow to resolution is recommended.. pt informed of need for f/u and will see pulm as outpt

## 2021-05-20 NOTE — ED PROVIDER NOTE - CARDIAC, MLM
Normal rate, regular rhythm.  Heart sounds S1, S2.  No murmurs, rubs or gallops.  No lower extremity edema/calf tenderness.

## 2021-05-20 NOTE — ED PROVIDER NOTE - CLINICAL SUMMARY MEDICAL DECISION MAKING FREE TEXT BOX
observed sob, pt is satting well on 3L, no distress, screening labs grossly wnl, ct angio showing .Bilateral diffuse lung opacities, increased at the apices and improved at the bases. New mediastinal lymphadenopathy, possibly reactive in nature. Follow to resolution is recommended.. pt informed of need for f/u and will see pulm as outpt

## 2021-05-20 NOTE — HISTORY OF PRESENT ILLNESS
[FreeTextEntry1] : \par This is 56 year old female with HTN, DM, d/c'd from [facility] for acute hypoxemic respiratory failure due to COVID PNA requiring supplemental oxygen now 4 weeks ago, pt also diagnosed with COVId associated DVT and started on eliquis. Came to MED/GYN clinic for annual exam\par \par Patient when interviewed had mod respiratory difficulty with increased rate PO - 99% on 5 liters O2

## 2021-05-20 NOTE — ED ADULT TRIAGE NOTE - CHIEF COMPLAINT QUOTE
sent in for abnormal EKG, denies c/o chest pain sent in for abnormal EKG, denies c/o chest pain, on home O2 5LNC since Covid dx on March 10, 2021

## 2021-05-20 NOTE — ED ADULT NURSE NOTE - NSIMPLEMENTINTERV_GEN_ALL_ED
Implemented All Universal Safety Interventions:  [place] to call system. Call bell, personal items and telephone within reach. Instruct patient to call for assistance. Room bathroom lighting operational. Non-slip footwear when patient is off stretcher. Physically safe environment: no spills, clutter or unnecessary equipment. Stretcher in lowest position, wheels locked, appropriate side rails in place.

## 2021-05-20 NOTE — ASSESSMENT
[FreeTextEntry1] : 56 year old female HTN, DM, d/c'd from [facility] for acute hypoxemic respiratory failure due to COVID PNA requiring supplemental oxygen now 4 weeks ago, pt also diagnosed with COVID associated DVT and started on eliquis. \par \par \par # COVID Pneumonia\par Complicated by DVT. Pt requiring persistently high O2 still on 5L, \par Labored breathing, suspect PE \par ER transfer for full work up

## 2021-05-21 ENCOUNTER — OUTPATIENT (OUTPATIENT)
Dept: OUTPATIENT SERVICES | Facility: HOSPITAL | Age: 56
LOS: 1 days | Discharge: HOME | End: 2021-05-21

## 2021-05-21 ENCOUNTER — APPOINTMENT (OUTPATIENT)
Dept: INTERNAL MEDICINE | Facility: CLINIC | Age: 56
End: 2021-05-21
Payer: MEDICAID

## 2021-05-21 VITALS — OXYGEN SATURATION: 96 % | HEART RATE: 102 BPM | DIASTOLIC BLOOD PRESSURE: 70 MMHG | SYSTOLIC BLOOD PRESSURE: 130 MMHG

## 2021-05-21 DIAGNOSIS — U07.1 COVID-19: ICD-10-CM

## 2021-05-21 DIAGNOSIS — G31.84 MILD COGNITIVE IMPAIRMENT OF UNCERTAIN OR UNKNOWN ETIOLOGY: ICD-10-CM

## 2021-05-21 PROCEDURE — 99214 OFFICE O/P EST MOD 30 MIN: CPT

## 2021-05-25 DIAGNOSIS — G31.84 MILD COGNITIVE IMPAIRMENT OF UNCERTAIN OR UNKNOWN ETIOLOGY: ICD-10-CM

## 2021-05-25 DIAGNOSIS — U07.1 COVID-19: ICD-10-CM

## 2021-05-26 DIAGNOSIS — Z86.718 PERSONAL HISTORY OF OTHER VENOUS THROMBOSIS AND EMBOLISM: ICD-10-CM

## 2021-05-26 DIAGNOSIS — U07.1 COVID-19: ICD-10-CM

## 2021-05-26 DIAGNOSIS — R06.03 ACUTE RESPIRATORY DISTRESS: ICD-10-CM

## 2021-05-31 NOTE — ADDENDUM
[FreeTextEntry1] : 55 yo female diagnosed with covid on march \par \par #covid \par sob \par on oxygen , unable to wean off \par no PE on ct scan yesterday \par on elquis for DVT \par will f/u with pulmonary \par blood work today \par f/u in 3 months

## 2021-05-31 NOTE — HISTORY OF PRESENT ILLNESS
[FreeTextEntry1] : 55 yo female diagnosed with covid on march \par was in hospital for 1 month. She received decadron , rmdv, and was on oxygen but never intubated. Her illness was complicated by a dvt. She was dc on home oxygen and eliquis. \par She is still complaining of sob and tiredness. She desats to 70s whn off oxygen

## 2021-06-03 ENCOUNTER — NON-APPOINTMENT (OUTPATIENT)
Age: 56
End: 2021-06-03

## 2021-06-09 ENCOUNTER — APPOINTMENT (OUTPATIENT)
Age: 56
End: 2021-06-09

## 2021-06-18 ENCOUNTER — OUTPATIENT (OUTPATIENT)
Dept: OUTPATIENT SERVICES | Facility: HOSPITAL | Age: 56
LOS: 1 days | Discharge: HOME | End: 2021-06-18

## 2021-06-18 ENCOUNTER — APPOINTMENT (OUTPATIENT)
Dept: GASTROENTEROLOGY | Facility: CLINIC | Age: 56
End: 2021-06-18
Payer: MEDICAID

## 2021-06-18 VITALS
BODY MASS INDEX: 31.5 KG/M2 | TEMPERATURE: 97.2 F | DIASTOLIC BLOOD PRESSURE: 76 MMHG | HEIGHT: 66 IN | OXYGEN SATURATION: 95 % | WEIGHT: 196 LBS | HEART RATE: 89 BPM | SYSTOLIC BLOOD PRESSURE: 170 MMHG

## 2021-06-18 DIAGNOSIS — Z78.9 OTHER SPECIFIED HEALTH STATUS: ICD-10-CM

## 2021-06-18 DIAGNOSIS — Z12.11 ENCOUNTER FOR SCREENING FOR MALIGNANT NEOPLASM OF COLON: ICD-10-CM

## 2021-06-18 PROCEDURE — 99204 OFFICE O/P NEW MOD 45 MIN: CPT | Mod: GC

## 2021-06-18 NOTE — HISTORY OF PRESENT ILLNESS
[de-identified] : 56 year old female with HTN, DM, DVT on eliquis, admitted in March at [facility] for acute hypoxemic respiratory failure due to COVID PNA requiring supplemental oxygen at home, and worsening lung opacities and comes here for screening colonoscopy.\par \par No blood in stools, no vomiting, no weight loss, no dysphagia, never had a colonoscopy, no abdominal pain

## 2021-06-18 NOTE — ASSESSMENT
[FreeTextEntry1] : 56 year old female with HTN, DM, DVT on eliquis, admitted in March at [facility] for acute hypoxemic respiratory failure due to COVID PNA requiring supplemental oxygen at home, and worsening lung opacities and comes here for screening colonoscopy.\par \par \par CRC screening : average risk\par No FH and no GI symptoms\par never had a colonoscopy\par \par Rec:\par needs pulmonary optimization and clearance before scheduling colonoscopy.\par has to eliquis for DVT before procedure\par F/U after above.

## 2021-06-18 NOTE — REVIEW OF SYSTEMS
[Shortness Of Breath] : shortness of breath [Cough] : cough [SOB on Exertion] : shortness of breath during exertion [Fever] : no fever [Chills] : no chills [Eye Pain] : no eye pain [Eyesight Problems] : no eyesight problems [Nosebleeds] : no nosebleeds [Nasal Discharge] : no nasal discharge [Chest Pain] : no chest pain [Palpitations] : no palpitations [Constipation] : no constipation [Diarrhea] : no diarrhea [Joint Swelling] : no joint swelling [Joint Stiffness] : no joint stiffness [Dizziness] : no dizziness [Fainting] : no fainting

## 2021-06-18 NOTE — PHYSICAL EXAM
[General Appearance - Alert] : alert [General Appearance - Well Nourished] : well nourished [Sclera] : the sclera and conjunctiva were normal [Outer Ear] : the ears and nose were normal in appearance [Hearing Threshold Finger Rub Not [place]] : hearing was normal [Neck Appearance] : the appearance of the neck was normal [Abdomen Soft] : soft [Abdomen Tenderness] : non-tender [Abnormal Walk] : normal gait [Nail Clubbing] : no clubbing  or cyanosis of the fingernails [Skin Color & Pigmentation] : normal skin color and pigmentation [Skin Turgor] : normal skin turgor [Oriented To Time, Place, And Person] : oriented to person, place, and time [FreeTextEntry1] : On oxygen 3L NC

## 2021-06-29 LAB
COVID-19 NUCLEOCAPSID  GAM ANTIBODY INTERPRETATION: POSITIVE
CRP SERPL-MCNC: 23 MG/L
DEPRECATED D DIMER PPP IA-ACNC: 17 NG/ML DDU
FERRITIN SERPL-MCNC: 242 NG/ML
PROCALCITONIN SERPL-MCNC: 0.04 NG/ML
SARS-COV-2 AB SERPL QL IA: 170 INDEX

## 2021-08-12 ENCOUNTER — NON-APPOINTMENT (OUTPATIENT)
Age: 56
End: 2021-08-12

## 2021-08-27 ENCOUNTER — APPOINTMENT (OUTPATIENT)
Dept: PULMONOLOGY | Facility: CLINIC | Age: 56
End: 2021-08-27

## 2021-09-03 ENCOUNTER — APPOINTMENT (OUTPATIENT)
Dept: GASTROENTEROLOGY | Facility: CLINIC | Age: 56
End: 2021-09-03

## 2021-09-28 ENCOUNTER — OUTPATIENT (OUTPATIENT)
Dept: OUTPATIENT SERVICES | Facility: HOSPITAL | Age: 56
LOS: 1 days | Discharge: HOME | End: 2021-09-28

## 2021-09-28 ENCOUNTER — NON-APPOINTMENT (OUTPATIENT)
Age: 56
End: 2021-09-28

## 2021-09-28 ENCOUNTER — APPOINTMENT (OUTPATIENT)
Dept: INTERNAL MEDICINE | Facility: CLINIC | Age: 56
End: 2021-09-28
Payer: MEDICAID

## 2021-09-28 VITALS
TEMPERATURE: 96.6 F | WEIGHT: 201 LBS | BODY MASS INDEX: 32.3 KG/M2 | HEART RATE: 80 BPM | DIASTOLIC BLOOD PRESSURE: 67 MMHG | OXYGEN SATURATION: 94 % | HEIGHT: 66 IN | SYSTOLIC BLOOD PRESSURE: 126 MMHG

## 2021-09-28 PROCEDURE — 99213 OFFICE O/P EST LOW 20 MIN: CPT | Mod: GC

## 2021-09-28 NOTE — ASSESSMENT
[FreeTextEntry1] :  56 year old female with PMHx of HTN, DMII and hx of covid infection. \par \par # provoked DVT secondary to covid (5/21)\par - continue eliquis for 6 months \par \par #DMII\par HbA1c 6.9%. FS well controlled\par -  c/w metformin 850 mg\par - a1c for next visit\par \par #HTN (controlled)\par - c/w nifedipine 30 mg od and metoprolol 25 mg bid\par \par #HCM\par - patient does not know meds. please reconcile on next visit. Instructed to bring meds to next visit \par - Colonoscopy: seen by gi for screening colonoscopy --> patient needs to be off eliquis prior to procedure.  \par - Mammogram: no previous mammogram -> script given on last visit, but patient has insurance issues\par - Pap smear -> OBGYN referral given > script given on last visit, but patient has insurance issues\par - RTC in 3 months/prn

## 2021-09-28 NOTE — REVIEW OF SYSTEMS
[Dyspnea on Exertion] : dyspnea on exertion [Fever] : no fever [Chest Pain] : no chest pain [Wheezing] : no wheezing [Cough] : no cough [Abdominal Pain] : no abdominal pain [Dysuria] : no dysuria [Hematuria] : no hematuria

## 2021-09-28 NOTE — HISTORY OF PRESENT ILLNESS
[FreeTextEntry1] : Follow up  [de-identified] :  56 year old female with PMHx of HTN, DMII and hx of covid infection. \par Patient presenting for follow up. \par Still reports some SOb on prolonged ambulation as a sequela of covid. No longer using oxygen. 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

## 2021-09-28 NOTE — PHYSICAL EXAM
[No Edema] : there was no peripheral edema [Soft] : abdomen soft [Non Tender] : non-tender [No Acute Distress] : no acute distress [No Respiratory Distress] : no respiratory distress  [No Accessory Muscle Use] : no accessory muscle use [No CVA Tenderness] : no CVA  tenderness [No Rash] : no rash

## 2021-09-29 DIAGNOSIS — Z00.00 ENCOUNTER FOR GENERAL ADULT MEDICAL EXAMINATION WITHOUT ABNORMAL FINDINGS: ICD-10-CM

## 2021-11-19 ENCOUNTER — APPOINTMENT (OUTPATIENT)
Dept: INTERNAL MEDICINE | Facility: CLINIC | Age: 56
End: 2021-11-19
Payer: MEDICAID

## 2021-11-19 ENCOUNTER — OUTPATIENT (OUTPATIENT)
Dept: OUTPATIENT SERVICES | Facility: HOSPITAL | Age: 56
LOS: 1 days | Discharge: HOME | End: 2021-11-19

## 2021-11-19 VITALS
WEIGHT: 209 LBS | BODY MASS INDEX: 33.59 KG/M2 | DIASTOLIC BLOOD PRESSURE: 70 MMHG | OXYGEN SATURATION: 100 % | TEMPERATURE: 96.9 F | HEART RATE: 74 BPM | HEIGHT: 66 IN | SYSTOLIC BLOOD PRESSURE: 191 MMHG

## 2021-11-19 DIAGNOSIS — U07.1 COVID-19: ICD-10-CM

## 2021-11-19 PROCEDURE — 99213 OFFICE O/P EST LOW 20 MIN: CPT | Mod: GC

## 2021-11-19 NOTE — HISTORY OF PRESENT ILLNESS
[FreeTextEntry1] : Follow up  [de-identified] :  56 year old female with PMHx of HTN, DMII and hx of covid infection. \par Patient presenting for follow up and would like clearance to return to work \par

## 2021-11-19 NOTE — PHYSICAL EXAM
[No Acute Distress] : no acute distress [No Respiratory Distress] : no respiratory distress  [No Accessory Muscle Use] : no accessory muscle use [No Edema] : there was no peripheral edema [Soft] : abdomen soft [Non Tender] : non-tender [No CVA Tenderness] : no CVA  tenderness [No Rash] : no rash

## 2021-11-19 NOTE — ASSESSMENT
[FreeTextEntry1] :  56 year old female with PMHx of HTN, DMII and hx of covid infection. \par \par # provoked DVT secondary to covid (5/21)\par - continue eliquis for 6 months \par - will repeat LE duplex \par \par #DMII\par HbA1c 6.9%. FS well controlled\par -  c/w metformin 850 mg\par - a1c for next visit\par \par #HTN - not controlled \par - /70\par - patient does not monitor her blood pressure. she states she will get a bp machine and keep record of her pressure.  \par - c/w nifedipine 60 mg od and metoprolol 25 mg bid\par \par #HCM\par - Colonoscopy: seen by gi for screening colonoscopy --> patient needs to be off eliquis prior to procedure.  \par - Mammogram: no previous mammogram ->referral given \par - Pap smear -> OBGYN referral given > script given on last visit, patient has an appointment in 2022\par - RTC in 3 months/prn\par \par Patient is cleared to return to work.

## 2021-11-24 DIAGNOSIS — Z86.718 PERSONAL HISTORY OF OTHER VENOUS THROMBOSIS AND EMBOLISM: ICD-10-CM

## 2021-11-24 DIAGNOSIS — I10 ESSENTIAL (PRIMARY) HYPERTENSION: ICD-10-CM

## 2021-11-24 DIAGNOSIS — U07.1 COVID-19: ICD-10-CM

## 2021-11-24 DIAGNOSIS — E11.9 TYPE 2 DIABETES MELLITUS WITHOUT COMPLICATIONS: ICD-10-CM

## 2021-12-27 ENCOUNTER — NON-APPOINTMENT (OUTPATIENT)
Age: 56
End: 2021-12-27

## 2022-01-07 ENCOUNTER — APPOINTMENT (OUTPATIENT)
Dept: INTERNAL MEDICINE | Facility: CLINIC | Age: 57
End: 2022-01-07

## 2022-02-23 NOTE — ED PROVIDER NOTE - CPE EDP RESP NORM
Refill request for: tretinoin (RETIN-A) 0.1 % cream  LV: 2/15/2019 Onychomycosis (Primary Dx); Dyschromia  Routed to Dr. Foley for approval.  Carly Rosenbuam, CMA           normal...

## 2022-02-25 ENCOUNTER — APPOINTMENT (OUTPATIENT)
Dept: INTERNAL MEDICINE | Facility: CLINIC | Age: 57
End: 2022-02-25
Payer: MEDICAID

## 2022-02-25 ENCOUNTER — NON-APPOINTMENT (OUTPATIENT)
Age: 57
End: 2022-02-25

## 2022-02-25 ENCOUNTER — OUTPATIENT (OUTPATIENT)
Dept: OUTPATIENT SERVICES | Facility: HOSPITAL | Age: 57
LOS: 1 days | Discharge: HOME | End: 2022-02-25

## 2022-02-25 VITALS
BODY MASS INDEX: 33.59 KG/M2 | WEIGHT: 209 LBS | HEIGHT: 66 IN | OXYGEN SATURATION: 98 % | TEMPERATURE: 98.5 F | SYSTOLIC BLOOD PRESSURE: 177 MMHG | DIASTOLIC BLOOD PRESSURE: 82 MMHG | HEART RATE: 79 BPM

## 2022-02-25 DIAGNOSIS — Z86.718 PERSONAL HISTORY OF OTHER VENOUS THROMBOSIS AND EMBOLISM: ICD-10-CM

## 2022-02-25 PROCEDURE — 99213 OFFICE O/P EST LOW 20 MIN: CPT | Mod: GC

## 2022-02-25 NOTE — HISTORY OF PRESENT ILLNESS
[FreeTextEntry1] : Follow up  [de-identified] : 57 year old female with PMHx of HTN, DMII, provoked DVT on Eliquis and hx of covid infection in March of 2021 presents for routine follow up and medication refill. Reports feeling well. Denies chest pain, SOB, and palpitations. No cough,fevers or chills.

## 2022-02-25 NOTE — PHYSICAL EXAM
[No Acute Distress] : no acute distress [No JVD] : no jugular venous distention [Supple] : supple [No Respiratory Distress] : no respiratory distress  [No Accessory Muscle Use] : no accessory muscle use [Clear to Auscultation] : lungs were clear to auscultation bilaterally [Normal Rate] : normal rate  [Regular Rhythm] : with a regular rhythm [Normal S1, S2] : normal S1 and S2 [No Edema] : there was no peripheral edema [Soft] : abdomen soft [Non Tender] : non-tender [Non-distended] : non-distended [No CVA Tenderness] : no CVA  tenderness [No Spinal Tenderness] : no spinal tenderness

## 2022-02-25 NOTE — ASSESSMENT
[FreeTextEntry1] : \par 57 year old female with PMHx of HTN, DMII, DVT and hx of covid infection. \par \par # provoked DVT secondary to covid (5/21)\par -B/L LE duplex ordered \par -if negative stop Eliquis \par \par #DMII\par HbA1c 6.9%. FS well controlled\par - c/w metformin 850 mg\par - a1c for next visit ordered \par \par #HTN - not controlled \par - /82\par -nifedipine 60 mg increase to 90 mg \par -cw  metoprolol 25 mg bid\par -advised on low salt diet \par \par #HCM\par - Colonoscopy: please give referral next visit \par - Mammogram: please give referral next visit \par - Pap smear -> OBGYN referral given > script given on last visit, patient has an appointment in 2022\par - RTC in 3 months and prn\par \par Patient is cleared to return to work.

## 2022-03-07 DIAGNOSIS — E11.9 TYPE 2 DIABETES MELLITUS WITHOUT COMPLICATIONS: ICD-10-CM

## 2022-03-07 DIAGNOSIS — Z86.718 PERSONAL HISTORY OF OTHER VENOUS THROMBOSIS AND EMBOLISM: ICD-10-CM

## 2022-03-07 DIAGNOSIS — I10 ESSENTIAL (PRIMARY) HYPERTENSION: ICD-10-CM

## 2022-03-30 ENCOUNTER — RESULT REVIEW (OUTPATIENT)
Age: 57
End: 2022-03-30

## 2022-03-30 ENCOUNTER — OUTPATIENT (OUTPATIENT)
Dept: OUTPATIENT SERVICES | Facility: HOSPITAL | Age: 57
LOS: 1 days | Discharge: HOME | End: 2022-03-30
Payer: MEDICAID

## 2022-03-30 DIAGNOSIS — M79.605 PAIN IN LEFT LEG: ICD-10-CM

## 2022-03-30 DIAGNOSIS — M79.604 PAIN IN RIGHT LEG: ICD-10-CM

## 2022-03-30 DIAGNOSIS — I82.409 ACUTE EMBOLISM AND THROMBOSIS OF UNSPECIFIED DEEP VEINS OF UNSPECIFIED LOWER EXTREMITY: ICD-10-CM

## 2022-03-30 PROCEDURE — 93970 EXTREMITY STUDY: CPT | Mod: 26

## 2022-03-31 LAB
ALBUMIN SERPL ELPH-MCNC: 4.7 G/DL
ALP BLD-CCNC: 77 U/L
ALT SERPL-CCNC: 16 U/L
ANION GAP SERPL CALC-SCNC: 18 MMOL/L
AST SERPL-CCNC: 24 U/L
BASOPHILS # BLD AUTO: 0.06 K/UL
BASOPHILS NFR BLD AUTO: 0.6 %
BILIRUB SERPL-MCNC: <0.2 MG/DL
BUN SERPL-MCNC: 19 MG/DL
CALCIUM SERPL-MCNC: 9.8 MG/DL
CHLORIDE SERPL-SCNC: 103 MMOL/L
CHOLEST SERPL-MCNC: 257 MG/DL
CO2 SERPL-SCNC: 20 MMOL/L
CREAT SERPL-MCNC: 0.9 MG/DL
EGFR: 74 ML/MIN/1.73M2
EOSINOPHIL # BLD AUTO: 1.61 K/UL
EOSINOPHIL NFR BLD AUTO: 16.8 %
ESTIMATED AVERAGE GLUCOSE: 131 MG/DL
GLUCOSE SERPL-MCNC: 86 MG/DL
HBA1C MFR BLD HPLC: 6.2 %
HCT VFR BLD CALC: 41.6 %
HDLC SERPL-MCNC: 55 MG/DL
HGB BLD-MCNC: 12.6 G/DL
IMM GRANULOCYTES NFR BLD AUTO: 0.3 %
LDLC SERPL CALC-MCNC: 185 MG/DL
LYMPHOCYTES # BLD AUTO: 3.11 K/UL
LYMPHOCYTES NFR BLD AUTO: 32.4 %
MAN DIFF?: NORMAL
MCHC RBC-ENTMCNC: 26.9 PG
MCHC RBC-ENTMCNC: 30.3 G/DL
MCV RBC AUTO: 88.9 FL
MONOCYTES # BLD AUTO: 0.57 K/UL
MONOCYTES NFR BLD AUTO: 5.9 %
NEUTROPHILS # BLD AUTO: 4.23 K/UL
NEUTROPHILS NFR BLD AUTO: 44 %
NONHDLC SERPL-MCNC: 202 MG/DL
PLATELET # BLD AUTO: 320 K/UL
POTASSIUM SERPL-SCNC: 4.8 MMOL/L
PROT SERPL-MCNC: 7.5 G/DL
RBC # BLD: 4.68 M/UL
RBC # FLD: 14.3 %
SODIUM SERPL-SCNC: 141 MMOL/L
TRIGL SERPL-MCNC: 83 MG/DL
TSH SERPL-ACNC: 1.93 UIU/ML
WBC # FLD AUTO: 9.61 K/UL

## 2022-04-01 LAB — 25(OH)D3 SERPL-MCNC: 20 NG/ML

## 2022-05-25 ENCOUNTER — OUTPATIENT (OUTPATIENT)
Dept: OUTPATIENT SERVICES | Facility: HOSPITAL | Age: 57
LOS: 1 days | Discharge: HOME | End: 2022-05-25

## 2022-05-25 ENCOUNTER — APPOINTMENT (OUTPATIENT)
Dept: INTERNAL MEDICINE | Facility: CLINIC | Age: 57
End: 2022-05-25

## 2022-05-25 VITALS
BODY MASS INDEX: 33.59 KG/M2 | SYSTOLIC BLOOD PRESSURE: 111 MMHG | HEART RATE: 98 BPM | HEIGHT: 66 IN | DIASTOLIC BLOOD PRESSURE: 68 MMHG | OXYGEN SATURATION: 95 % | WEIGHT: 209 LBS

## 2022-05-25 DIAGNOSIS — Z00.00 ENCOUNTER FOR GENERAL ADULT MEDICAL EXAMINATION W/OUT ABNORMAL FINDINGS: ICD-10-CM

## 2022-05-25 PROCEDURE — 99214 OFFICE O/P EST MOD 30 MIN: CPT | Mod: GC

## 2022-05-25 RX ORDER — APIXABAN 5 MG/1
5 TABLET, FILM COATED ORAL
Qty: 60 | Refills: 5 | Status: DISCONTINUED | COMMUNITY
Start: 2021-05-05 | End: 2022-05-25

## 2022-05-25 RX ORDER — BUDESONIDE AND FORMOTEROL FUMARATE DIHYDRATE 80; 4.5 UG/1; UG/1
80-4.5 AEROSOL RESPIRATORY (INHALATION) TWICE DAILY
Qty: 5 | Refills: 5 | Status: ACTIVE | COMMUNITY
Start: 2022-05-25 | End: 1900-01-01

## 2022-05-25 NOTE — ASSESSMENT
[FreeTextEntry1] : 57 year old female with PMHx of HTN, DMII, provoked DVT on Eliquis and hx of covid infection in March of 2021 presents for routine follow up \par \par # provoked DVT secondary to covid (5/21)- resolved\par -B/L LE duplex 3/22: no DVT\par - will discontinue eliquis \par \par #DMII\par HbA1c 6.9%. FS well controlled>>repeat 6.2 ( 3/22)\par - c/w metformin 850 mg BID\par - carb consistent diet\par \par #HTN \par - on nifedipine 90 mg  (was increased in last visit)\par - cw  metoprolol 25 mg bid\par - advised on low salt diet \par \par #HX COVID\par - BOWEN since infection\par - refer to post COVId clinic \par - ordered symbicort 2 puff BID\par \par #HCM\par - Colonoscopy:referral to GI\par - Mammogram:script given\par - Pap smear -> OBGYN referral given > script given on last visit, patient has an appointment in 2022\par - RTC in 3 months and prn\par \par

## 2022-05-25 NOTE — HISTORY OF PRESENT ILLNESS
[FreeTextEntry1] : Follow up  [de-identified] : 57 year old female with PMHx of HTN, DMII, provoked DVT on Eliquis and hx of covid infection in March of 2021 presents for routine follow up and medication refill. patient complaints of BOWEN since COVId infection. Patient's repeat [facility] doppler negative for DVT

## 2022-08-12 ENCOUNTER — APPOINTMENT (OUTPATIENT)
Dept: GASTROENTEROLOGY | Facility: CLINIC | Age: 57
End: 2022-08-12

## 2022-08-24 ENCOUNTER — APPOINTMENT (OUTPATIENT)
Dept: INTERNAL MEDICINE | Facility: CLINIC | Age: 57
End: 2022-08-24

## 2022-08-24 ENCOUNTER — OUTPATIENT (OUTPATIENT)
Dept: OUTPATIENT SERVICES | Facility: HOSPITAL | Age: 57
LOS: 1 days | Discharge: HOME | End: 2022-08-24

## 2022-08-24 VITALS
WEIGHT: 201 LBS | HEIGHT: 66 IN | DIASTOLIC BLOOD PRESSURE: 85 MMHG | BODY MASS INDEX: 32.3 KG/M2 | SYSTOLIC BLOOD PRESSURE: 157 MMHG | HEART RATE: 62 BPM | OXYGEN SATURATION: 98 % | TEMPERATURE: 98 F

## 2022-08-24 DIAGNOSIS — I10 ESSENTIAL (PRIMARY) HYPERTENSION: ICD-10-CM

## 2022-08-24 DIAGNOSIS — E11.9 TYPE 2 DIABETES MELLITUS W/OUT COMPLICATIONS: ICD-10-CM

## 2022-08-24 DIAGNOSIS — R60.0 LOCALIZED EDEMA: ICD-10-CM

## 2022-08-24 DIAGNOSIS — R06.02 SHORTNESS OF BREATH: ICD-10-CM

## 2022-08-24 DIAGNOSIS — E11.9 TYPE 2 DIABETES MELLITUS WITHOUT COMPLICATIONS: ICD-10-CM

## 2022-08-24 DIAGNOSIS — R06.00 DYSPNEA, UNSPECIFIED: ICD-10-CM

## 2022-08-24 PROCEDURE — 99214 OFFICE O/P EST MOD 30 MIN: CPT | Mod: GC

## 2022-08-24 RX ORDER — METFORMIN HYDROCHLORIDE 850 MG/1
850 TABLET, COATED ORAL
Qty: 60 | Refills: 3 | Status: ACTIVE | COMMUNITY
Start: 2021-05-05 | End: 1900-01-01

## 2022-08-24 RX ORDER — NIFEDIPINE 90 MG/1
90 TABLET, EXTENDED RELEASE ORAL DAILY
Qty: 90 | Refills: 1 | Status: ACTIVE | COMMUNITY
Start: 2021-05-05 | End: 1900-01-01

## 2022-08-24 RX ORDER — PROMETHAZINE HYDROCHLORIDE AND DEXTROMETHORPHAN HYDROBROMIDE ORAL SOLUTION 15; 6.25 MG/5ML; MG/5ML
6.25-15 SOLUTION ORAL
Qty: 2 | Refills: 0 | Status: DISCONTINUED | COMMUNITY
Start: 2021-05-21 | End: 2022-08-24

## 2022-08-24 RX ORDER — METOPROLOL TARTRATE 25 MG/1
25 TABLET, FILM COATED ORAL TWICE DAILY
Qty: 60 | Refills: 5 | Status: ACTIVE | COMMUNITY
Start: 2021-05-05 | End: 1900-01-01

## 2022-08-24 NOTE — HISTORY OF PRESENT ILLNESS
[FreeTextEntry1] : Follow up  [de-identified] : 57 year old female with PMHx of HTN, DMII, provoked DVT 2/2 covid resolved off Eliquis and hx of covid infection in March of 2021 presents for routine follow up and medication refill.

## 2022-08-24 NOTE — REVIEW OF SYSTEMS
[Lower Ext Edema] : lower extremity edema [Dyspnea on Exertion] : dyspnea on exertion [Fever] : no fever [Chills] : no chills [Night Sweats] : no night sweats [Vision Problems] : no vision problems [Itching] : no itching [Earache] : no earache [Hearing Loss] : no hearing loss [Chest Pain] : no chest pain [Palpitations] : no palpitations [Orthopnea] : no orthopnea [Paroxysmal Nocturnal Dyspnea] : no paroxysmal nocturnal dyspnea [Wheezing] : no wheezing [Cough] : no cough [Abdominal Pain] : no abdominal pain [Vomiting] : no vomiting [Heartburn] : no heartburn [Dysuria] : no dysuria [Frequency] : no frequency [Joint Pain] : no joint pain [Muscle Pain] : no muscle pain [Headache] : no headache [Dizziness] : no dizziness [Anxiety] : no anxiety [Depression] : no depression [FreeTextEntry2] : Sweating occasional

## 2022-08-24 NOTE — PHYSICAL EXAM
[No Acute Distress] : no acute distress [Well Nourished] : well nourished [Normal Sclera/Conjunctiva] : normal sclera/conjunctiva [Normal Outer Ear/Nose] : the outer ears and nose were normal in appearance [No Lymphadenopathy] : no lymphadenopathy [Thyroid Normal, No Nodules] : the thyroid was normal and there were no nodules present [No Respiratory Distress] : no respiratory distress  [Clear to Auscultation] : lungs were clear to auscultation bilaterally [Normal Rate] : normal rate  [Regular Rhythm] : with a regular rhythm [Normal S1, S2] : normal S1 and S2 [Pedal Pulses Present] : the pedal pulses are present [Soft] : abdomen soft [Non Tender] : non-tender [Non-distended] : non-distended [No Rash] : no rash [Coordination Grossly Intact] : coordination grossly intact [No Focal Deficits] : no focal deficits

## 2022-08-24 NOTE — ASSESSMENT
[FreeTextEntry1] : 57 year old female with PMHx of HTN, DMII, provoked DVT 2/2 covid resolved and hx of covid infection in March of 2021 presents for routine follow up and refills. Referral for routine labs, ob-gyn, GI and mammogram was given last visit on May 25 2022 but patient did not get any of these. Pt. c/o SOB on exertion since post covid.\par \par # Shortness of breath on exertion, ocassional sweating and B/L pedal edema\par #Hx of covid- followed with Dr. Avila at covid clinic\par - Pt. was Rx'ed symbicort last visit but did not take it --->Advised to take symbicort inhaler\par - Ordered ECHO and B/L LE venous duplex\par -cardiology referral given\par \par #DMII\par HbA1c 6.2--> f/u repeat A1c\par - c/w metformin 850 mg BID\par - carb consistent diet\par \par #HTN \par - on nifedipine 90 mg , Today on higher 150s but pt. said at home its below 130s, will monitor\par - cw  metoprolol 25 mg bid\par - advised on low salt diet \par \par \par #HCM\par - Routine labs ordered, will follow\par - Refills given\par - Colonoscopy:referral to GI given last visit-->pt. refused\par - Mammogram referral given last visit-->pt. refused\par - Pap smear -> OBGYN referral given last visit--> pt. refused to get\par - RTC in 3 months and prn\par \par

## 2022-10-18 NOTE — DIETITIAN INITIAL EVALUATION ADULT. - OTHER INFO
Pertinent Medical Information: p/w SOB. Admitted with acute respiratory failure with hypoxia. Acute Hypoxic Respiratory failure secondary to SARS-CoV-2 PNA. h/o HTN noted. HgbA1C 6.9%.    Ht: 172.7 cm. Wt: 99.6 kg. BMI: 33.4. IBW: 63.6 kg.    Alert. Last BM reported 3/14. No chewing/swallowing difficulty reported. Skin noted intact.    Unable to perform physical assessment at this time d/t contact precautions. Per family, fair appetite & po intake PTP. Low salt diet PTP. NKFA. No supplements. No preferences reported. Limited previous nutrition education reported. No known h/o unintentional wt loss reported.    Currently receives DASH/TLC + Consistent Carbohydrate (no snacks) diet. Appetite reported decreased d/t current illness & SOB; consuming 50% of meals at this time. Colchicine Counseling:  Patient counseled regarding adverse effects including but not limited to stomach upset (nausea, vomiting, stomach pain, or diarrhea).  Patient instructed to limit alcohol consumption while taking this medication.  Colchicine may reduce blood counts especially with prolonged use.  The patient understands that monitoring of kidney function and blood counts may be required, especially at baseline. The patient verbalized understanding of the proper use and possible adverse effects of colchicine.  All of the patient's questions and concerns were addressed.

## 2022-10-22 NOTE — PROGRESS NOTE ADULT - ASSESSMENT
Patient understands condition, prognosis and need for follow up care. Patient is a 56-year-old, female patient, PMHx: Hypertension, presented to the ED for Shortness of breath and found to have COVID19 PNA    Acute Hypoxic Respiratory Failure secondary to COVID 19 PNA  Inadequate Oral intake  Currently on 5L NC saturating 91%  desaturates when working with PT  s/p RDV therapy and s/p Levaquin x7d (03/12-03/19)  CTA 03/29: No PE, LE Duplex (04/01): b/l soleal vein thrombosis  BNP 04/02 61  On therapeutic Lovenox   Febrile to 101 yesterday, UA neg, f/u blood cx and procal   no fevers since, continue monitoring off antibiotics  on decadron taper, decrease to 2mg daily tomorrow  Taper down oxygen requirements as tolerated  aggressive PT  dietary following for nutrition    HTN  Sinus tachycardia  Metoprolol tartrate 25 BID    DM2, new onset  Hba1c 6.9 (03/12)  Lantus 8units with ISS  endocrine eval prior to DC to establish care    #Progress Note Handoff  Pending (specify):   improvement in respiratory status, ongoing PT, f/u blood cx/procal   Family discussion: Plan of care discussed with patient, aware and agreeable   Disposition: home versus east, patient is very deconditioned     Danii López MD  s. [id number]

## 2022-12-14 ENCOUNTER — APPOINTMENT (OUTPATIENT)
Dept: INTERNAL MEDICINE | Facility: CLINIC | Age: 57
End: 2022-12-14

## 2023-05-02 ENCOUNTER — APPOINTMENT (OUTPATIENT)
Dept: PULMONOLOGY | Facility: CLINIC | Age: 58
End: 2023-05-02

## 2023-06-30 ENCOUNTER — APPOINTMENT (OUTPATIENT)
Dept: PULMONOLOGY | Facility: CLINIC | Age: 58
End: 2023-06-30
